# Patient Record
Sex: MALE | Race: WHITE | HISPANIC OR LATINO | Employment: OTHER | URBAN - METROPOLITAN AREA
[De-identification: names, ages, dates, MRNs, and addresses within clinical notes are randomized per-mention and may not be internally consistent; named-entity substitution may affect disease eponyms.]

---

## 2017-01-24 ENCOUNTER — OFFICE VISIT (OUTPATIENT)
Dept: MEDICAL GROUP | Facility: MEDICAL CENTER | Age: 62
End: 2017-01-24
Attending: INTERNAL MEDICINE
Payer: MEDICAID

## 2017-01-24 VITALS
RESPIRATION RATE: 22 BRPM | WEIGHT: 152 LBS | HEIGHT: 63 IN | TEMPERATURE: 101.8 F | SYSTOLIC BLOOD PRESSURE: 148 MMHG | DIASTOLIC BLOOD PRESSURE: 72 MMHG | BODY MASS INDEX: 26.93 KG/M2 | OXYGEN SATURATION: 95 % | HEART RATE: 112 BPM

## 2017-01-24 DIAGNOSIS — Q74.2 CONGENITAL FOOT ABNORMALITY: ICD-10-CM

## 2017-01-24 DIAGNOSIS — I10 ESSENTIAL HYPERTENSION: ICD-10-CM

## 2017-01-24 DIAGNOSIS — E11.9 TYPE 2 DIABETES MELLITUS WITHOUT COMPLICATION, WITHOUT LONG-TERM CURRENT USE OF INSULIN (HCC): ICD-10-CM

## 2017-01-24 DIAGNOSIS — Z12.11 SCREEN FOR COLON CANCER: ICD-10-CM

## 2017-01-24 DIAGNOSIS — E78.2 MIXED HYPERLIPIDEMIA: ICD-10-CM

## 2017-01-24 DIAGNOSIS — R19.7 ACUTE DIARRHEA: ICD-10-CM

## 2017-01-24 PROBLEM — M79.672 LEFT FOOT PAIN: Status: ACTIVE | Noted: 2017-01-24

## 2017-01-24 PROBLEM — R11.2 NON-INTRACTABLE VOMITING WITH NAUSEA: Status: ACTIVE | Noted: 2017-01-24

## 2017-01-24 PROCEDURE — 99204 OFFICE O/P NEW MOD 45 MIN: CPT | Performed by: INTERNAL MEDICINE

## 2017-01-24 PROCEDURE — 99202 OFFICE O/P NEW SF 15 MIN: CPT | Performed by: INTERNAL MEDICINE

## 2017-01-24 RX ORDER — SIMVASTATIN 20 MG
20 TABLET ORAL
Refills: 3 | COMMUNITY
Start: 2016-12-31 | End: 2018-02-15 | Stop reason: SDUPTHER

## 2017-01-24 RX ORDER — ENALAPRIL MALEATE 2.5 MG/1
TABLET ORAL
Refills: 3 | COMMUNITY
Start: 2016-12-31 | End: 2018-02-15 | Stop reason: SDUPTHER

## 2017-01-24 RX ORDER — ASPIRIN 81 MG/1
TABLET ORAL
Refills: 3 | COMMUNITY
Start: 2016-12-01 | End: 2018-02-15 | Stop reason: SDUPTHER

## 2017-01-24 RX ORDER — CIPROFLOXACIN 500 MG/1
500 TABLET, FILM COATED ORAL 2 TIMES DAILY
Qty: 10 TAB | Refills: 0 | Status: SHIPPED | OUTPATIENT
Start: 2017-01-24 | End: 2017-01-29

## 2017-01-24 RX ORDER — GLIMEPIRIDE 2 MG/1
TABLET ORAL
COMMUNITY
Start: 2017-01-22 | End: 2017-09-19

## 2017-01-24 NOTE — ASSESSMENT & PLAN NOTE
Patient reports the onset of abdominal pain, diarrhea, nausea and vomiting about 2 days ago. He has been unable to keep down any food or medications, and only small sips of water. He denies eating any contaminated foods or drinking unfiltered water. He has not traveled outside of the country recently. He has been having episodes of diarrhea at night, as well as after every time he eats. He has been taking his temperature at home, and has noticed a fever of 100.8. His temperature is 101.8 in clinic today. The stools have been mostly watery without blood or mucus. His brother had a similar illness recently.

## 2017-01-24 NOTE — MR AVS SNAPSHOT
"        Berlin Pelaez   2017 3:30 PM   Office Visit   MRN: [id number]    Department:  [facility]   Dept Phone:  [phone]    Description:  Male : 1955   Provider:  Daja Blevins M.D.           Reason for Visit     Establish Care     Emesis     Diarrhea           Allergies as of 2017     No Known Allergies      You were diagnosed with     Non-intractable vomiting with nausea, unspecified vomiting type   [2427963]       Congenital foot abnormality   [069301]       Type 2 diabetes mellitus without complication, without long-term current use of insulin (CMS-HCC)   [4441177]       Essential hypertension   [3883575]       Mixed hyperlipidemia   [272.2.ICD-9-CM]       Screen for colon cancer   [993125]         Vital Signs     Blood Pressure Pulse Temperature Respirations Height Weight    148/72 mmHg 112 38.8 °C (101.8 °F) 22 1.588 m (5' 2.52\") 68.947 kg (152 lb)    Body Mass Index Oxygen Saturation Smoking Status             27.34 kg/m2 95% Never Smoker          Basic Information     Date Of Birth Sex Race Ethnicity Preferred Language    1955 Male  or   Origin (Guinean,Malawian,Jamaican,Zimbabwean, etc) Guinean      Your appointments     2017  8:10 AM   Established Patient with Daja Blevins M.D.   [facility] ([facility])    [address] [place] [id number]   [phone]           You will be receiving a confirmation call a few days before your appointment from our automated call confirmation system.              Problem List              ICD-10-CM Priority Class Noted - Resolved    Non-intractable vomiting with nausea R11.2   2017 - Present    Congenital foot abnormality Q74.2   2017 - Present    Type 2 diabetes mellitus without complication (HCC) E11.9   2017 - Present    Essential hypertension I10   2017 - Present    Mixed hyperlipidemia E78.2   2017 - Present      Health Maintenance        Date Due " Completion Dates    A1C SCREENING 4/17/1956 ---    DIABETES MONOFILAMENT / LE EXAM 4/17/1955 ---    RETINAL SCREENING 10/17/1972 ---    IMM DTaP/Tdap/Td Vaccine (1 - Tdap) 10/17/1973 ---    IMM PNEUMOCOCCAL 19-64 (ADULT) MEDIUM RISK SERIES (1 of 1 - PPSV23) 10/17/1973 ---    COLONOSCOPY 10/17/2004 ---    IMM ZOSTER VACCINE 10/17/2014 ---    IMM INFLUENZA (1) 9/1/2016 ---    FASTING LIPID PROFILE 11/2/2016 11/2/2015, 2/4/2015    URINE ACR / MICROALBUMIN 11/2/2016 11/2/2015    SERUM CREATININE 11/2/2016 11/2/2015, 2/4/2015            Current Immunizations     No immunizations on file.      Below and/or attached are the medications your provider expects you to take. Review all of your home medications and newly ordered medications with your provider and/or pharmacist. Follow medication instructions as directed by your provider and/or pharmacist. Please keep your medication list with you and share with your provider. Update the information when medications are discontinued, doses are changed, or new medications (including over-the-counter products) are added; and carry medication information at all times in the event of emergency situations     Allergies:  No Known Allergies          Medications  Valid as of: January 24, 2017 -  4:19 PM    Generic Name Brand Name Tablet Size Instructions for use    Aspirin (Tablet Delayed Response) aspirin 81 MG TAKE 1 TABLET BY MOUTH DAILY        Ciprofloxacin HCl (Tab) CIPRO 500 MG Take 1 Tab by mouth 2 times a day for 5 days.        Enalapril Maleate (Tab) VASOTEC 2.5 MG TAKE 1 TABLET BY MOUTH DAILY FOR BLOOD PRESSURE AND KIDNEY PROTECTION        Glimepiride (Tab) AMARYL 2 MG         MetFORMIN HCl (Tab) GLUCOPHAGE 1000 MG TAKE 1 TABLET BY MOUTH 2 TIMES DAILY FOR DIABETES        Simvastatin (Tab) ZOCOR 20 MG Take 20 mg by mouth.        .                 Medicines prescribed today were sent to:     [facility] [place] - [address]    [address] [facility] [place] [address]     Phone: [phone] Fax: [phone]    Open 24 Hours?: No      Medication refill instructions:       If your prescription bottle indicates you have medication refills left, it is not necessary to call your provider’s office. Please contact your pharmacy and they will refill your medication.    If your prescription bottle indicates you do not have any refills left, you may request refills at any time through one of the following ways: The online [employer] system (except Urgent Care), by calling your provider’s office, or by asking your pharmacy to contact your provider’s office with a refill request. Medication refills are processed only during regular business hours and may not be available until the next business day. Your provider may request additional information or to have a follow-up visit with you prior to refilling your medication.   *Please Note: Medication refills are assigned a new Rx number when refilled electronically. Your pharmacy may indicate that no refills were authorized even though a new prescription for the same medication is available at the pharmacy. Please request the medicine by name with the pharmacy before contacting your provider for a refill.        Your To Do List     Future Labs/Procedures Complete By Expires    CBC WITH DIFFERENTIAL  As directed 1/25/2018    COMP METABOLIC PANEL  As directed 1/25/2018    HEMOGLOBIN A1C  As directed 1/25/2018    LIPID PROFILE  As directed 1/25/2018      Referral     A referral request has been sent to our patient care coordination department. Please allow 3-5 business days for us to process this request and contact you either by phone or mail. If you do not hear from us by the 5th business day, please call us at [phone].           [employer] Access Code: [email or web address]  Expires: 2/23/2017  4:19 PM    [employer]  A secure, online tool to manage your health information     [employer]’s [employer]® is a secure, online tool that connects you to your  personalized health information from the privacy of your home -- day or night - making it very easy for you to manage your healthcare. Once the activation process is completed, you can even access your medical information using the [employer] leni, which is available for free in the Apple Leni store or Google Play store.     [employer] provides the following levels of access (as shown below):   My Chart Features   [facility] Primary [facility] Doctor [facility]  Specialists [facility]  Urgent  Care Non-[facility]  Primary Care  Doctor   Email your healthcare team securely and privately 24/7 X X X    Manage appointments: schedule your next appointment; view details of past/upcoming appointments X      Request prescription refills. X      View recent personal medical records, including lab and immunizations X X X X   View health record, including health history, allergies, medications X X X X   Read reports about your outpatient visits, procedures, consult and ER notes X X X X   See your discharge summary, which is a recap of your hospital and/or ER visit that includes your diagnosis, lab results, and care plan. X X       How to register for [employer]:  1. Go to  [email or web address].  2. Click on the Sign Up Now box, which takes you to the New Member Sign Up page. You will need to provide the following information:  a. Enter your [employer] Access Code exactly as it appears at the top of this page. (You will not need to use this code after you’ve completed the sign-up process. If you do not sign up before the expiration date, you must request a new code.)   b. Enter your date of birth.   c. Enter your home email address.   d. Click Submit, and follow the next screen’s instructions.  3. Create a Expl[employer] ID. This will be your [employer] login ID and cannot be changed, so think of one that is secure and easy to remember.  4. Create a [employer] password. You can change your password at any time.  5. Enter your Password Reset Question and Answer. This can  be used at a later time if you forget your password.   6. Enter your e-mail address. This allows you to receive e-mail notifications when new information is available in [employer].  7. Click Sign Up. You can now view your health information.    For assistance activating your [employer] account, call [phone]

## 2017-01-25 ENCOUNTER — HOSPITAL ENCOUNTER (EMERGENCY)
Facility: MEDICAL CENTER | Age: 62
End: 2017-01-26
Attending: EMERGENCY MEDICINE
Payer: MEDICAID

## 2017-01-25 DIAGNOSIS — K52.9 ENTEROCOLITIS: ICD-10-CM

## 2017-01-25 DIAGNOSIS — R19.7 DIARRHEA, UNSPECIFIED TYPE: ICD-10-CM

## 2017-01-25 PROCEDURE — 99284 EMERGENCY DEPT VISIT MOD MDM: CPT

## 2017-01-25 ASSESSMENT — PAIN SCALES - GENERAL: PAINLEVEL_OUTOF10: 2

## 2017-01-25 NOTE — ED AVS SNAPSHOT
[employer] Access Code: [email or web address]  Expires: 2/23/2017  4:19 PM    Your email address is not on file at [facility].  Email Addresses are required for you to sign up for [employer], please contact [phone] to verify your personal information and to provide your email address prior to attempting to register for [employer].    Berlin Pelaez  [address] [place] [address]    [employer]  A secure, online tool to manage your health information     [facility]’s [employer]® is a secure, online tool that connects you to your personalized health information from the privacy of your home -- day or night - making it very easy for you to manage your healthcare. Once the activation process is completed, you can even access your medical information using the [employer] leni, which is available for free in the Apple Leni store or Google Play store.     To learn more about [employer], visit [email or web address]    There are two levels of access available (as shown below):   My Chart Features  [facility] Primary Care Doctor [facility]  Specialists [facility]  Urgent  Care Non-[facility] Primary Care Doctor   Email your healthcare team securely and privately 24/7 X X X    Manage appointments: schedule your next appointment; view details of past/upcoming appointments X      Request prescription refills. X      View recent personal medical records, including lab and immunizations X X X X   View health record, including health history, allergies, medications X X X X   Read reports about your outpatient visits, procedures, consult and ER notes X X X X   See your discharge summary, which is a recap of your hospital and/or ER visit that includes your diagnosis, lab results, and care plan X X  X     How to register for [employer]:  Once your e-mail address has been verified, follow the following steps to sign up for [employer].     1. Go to  [email or web address]  2. Click on the Sign Up Now box, which takes you to the New Member Sign Up page. You will  need to provide the following information:  a. Enter your [employer] Access Code exactly as it appears at the top of this page. (You will not need to use this code after you’ve completed the sign-up process. If you do not sign up before the expiration date, you must request a new code.)   b. Enter your date of birth.   c. Enter your home email address.   d. Click Submit, and follow the next screen’s instructions.  3. Create a [employer] ID. This will be your [employer] login ID and cannot be changed, so think of one that is secure and easy to remember.  4. Create a [employer] password. You can change your password at any time.  5. Enter your Password Reset Question and Answer. This can be used at a later time if you forget your password.   6. Enter your e-mail address. This allows you to receive e-mail notifications when new information is available in [employer].  7. Click Sign Up. You can now view your health information.    For assistance activating your [employer] account, call [phone]

## 2017-01-25 NOTE — ASSESSMENT & PLAN NOTE
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 He had an operation on his left foot during childhood when he was in [place]. He has had issues with the foot since that time. He currently walks on the ball of his foot, and has daily foot pain, as his job requires him to be standing and walking all day. He has tried Tylenol and ibuprofen for the pain which have been ineffective. He was told in the past that he would need a second surgery to correct the deformity, and that he also has a significant amount of arthritis in the foot.

## 2017-01-25 NOTE — ED AVS SNAPSHOT
After Visit Summary                                                                                                                Berlin Pelaez   MRN: [id number]    Department:  [facility], Emergency Dept   Date of Visit:  1/25/2017            [facility], Emergency Dept    [address]    [place] [address]    Phone:  [phone]      You were seen by     Andreina Guzman M.D.      Your Diagnosis Was     Diarrhea, unspecified type     R19.7       These are the medications you received during your hospitalization from 01/25/2017 2323 to 01/26/2017 0217     Date/Time Order Dose Route Action    01/26/2017 0158 iohexol (OMNIPAQUE) 350 mg/mL 100 mL Intravenous Given      Follow-up Information     1. Schedule an appointment as soon as possible for a visit with Daja Blevins M.D..    Specialty:  Internal Medicine    Why:  If symptoms worsen    Contact information    [address]  [address]  [place] [address] [phone]        Medication Information     Review all of your home medications and newly ordered medications with your primary doctor and/or pharmacist as soon as possible. Follow medication instructions as directed by your doctor and/or pharmacist.     Please keep your complete medication list with you and share with your physician. Update the information when medications are discontinued, doses are changed, or new medications (including over-the-counter products) are added; and carry medication information at all times in the event of emergency situations.               Medication List      ASK your doctor about these medications        Instructions    aspirin 81 MG EC tablet    TAKE 1 TABLET BY MOUTH DAILY       ciprofloxacin 500 MG Tabs   Commonly known as:  CIPRO    Take 1 Tab by mouth 2 times a day for 5 days.   Dose:  500 mg       enalapril 2.5 MG Tabs   Commonly known as:  VASOTEC    TAKE 1 TABLET BY MOUTH DAILY FOR BLOOD PRESSURE AND KIDNEY  PROTECTION       glimepiride 2 MG Tabs   Commonly known as:  AMARYL        metformin 1000 MG tablet   Commonly known as:  GLUCOPHAGE    TAKE 1 TABLET BY MOUTH 2 TIMES DAILY FOR DIABETES       simvastatin 20 MG Tabs   Commonly known as:  ZOCOR    Take 20 mg by mouth.   Dose:  20 mg               Procedures and tests performed during your visit     CBC WITH DIFFERENTIAL    COMP METABOLIC PANEL    CONSENT FOR CONTRAST INJECTION    CT-ABDOMEN-PELVIS WITH    ESTIMATED GFR    LIPASE    TROPONIN        Discharge Instructions       Diarrea   (Diarrhea)  La diarrea consiste en evacuaciones intestinales frecuentes, blandas o acuosas. Puede hacerlo sentir débil y deshidratado. La deshidratación puede hacer que se sienta cansado, sediento, tener la boca seca y que haya disminución de orina, que a menudo es de color amarillo oscuro. La diarrea es un signo de otro problema, generalmente iva infección que no durará mucho tiempo. En la mayoría de los casos, la diarrea dura típicamente 2 a 3 días. Sin embargo, puede durar más tiempo si se trata de un signo de algo más herlinda. Es importante tratar la diarrea luba lo indique graves médico para disminuir o prevenir futuros episodios de diarrea.   CAUSAS   Algunas causas comunes son:   · Infecciones gastrointestinales causadas por virus, bacterias o parásitos.  · Intoxicación alimentaria o alergias a los alimentos.  · Ciertos medicamentos, luba los antibióticos, quimioterapia y laxantes.  · Edulcorantes artificiales y fructosa.  · Los trastornos digestivos.  INSTRUCCIONES PARA EL CUIDADO EN EL HOGAR   · Asegure iva adecuada ingesta de líquidos (hidratación). Evite los líquidos que contengan azúcares simples o las bebidas deportivas, los jugos de frutas, los productos derivados de la leche entera y las gaseosas. Si eyad la cantidad suficiente de líquidos, la orina debe ser lucia o amarillo pálido. Iva solución de rehidratación oral se puede comprar en las farmacias, en las tiendas minoristas  y por Internet. Se puede preparar iva solución de rehidratación oral casera con los siguientes ingredientes:  ·  - cucharadita de sal.  · ¾ cucharadita de bicarbonato.  ·  de cucharadita de sal sustituta que contenga cloruro de potasio.  · 1  cucharada de azúcar.  · 1l (34 onzas) de agua.  · Ciertos alimentos y bebidas pueden aumentar la velocidad a la que el alimento se mueve a través del tracto gastrointestinal (GI). Estos alimentos y bebidas deben evitarse e incluyen:  · Bebidas alcohólicas y con cafeína.  · Alimentos ricos en fibra, luba frutas y verduras, nueces, semillas, panes y cereales integrales.  · Alimentos y bebidas endulzados con alcoholes de azúcar, tales luba xilitol, sorbitol, y manitol.  · Algunos alimentos pueden ser fidel tolerados y puede ayudar a espesar las heces, incluyendo:  · Alimentos con almidón, luba arroz, pan, pasta, cereales bajos en azúcar, lesley, sémola de maíz, michael al horno, galletas y panecillos.  · Bananas.  · Puré de manzana.  · Agregue alimentos ricos en probióticos a la dieta del alfredo para ayudar a aumentar las bacterias saludables en el tracto gastrointestinal, luba el yogur y productos lácteos fermentados.  · Lávese fidel las ayaz después de cada episodio de diarrea.  · [place] sólo medicamentos de venta li o recetados, según las indicaciones del médico.  · [place] un baño caliente para ayudar a disminuir ardor o dolor por los episodios frecuentes de diarrea.  SOLICITE ATENCIÓN MÉDICA DE INMEDIATO SI:   · No puede retener los líquidos.  · Tiene vómitos persistentes.  · Observa maura en la materia fecal, o las heces son negras y de aspecto alquitranado.  · No hay emisión de orina ines 6 a 8 horas o elimina iva pequeña cantidad de orina muy oscura.  · Tiene dolor abdominal que aumenta o se localiza.  · Está muy mareado o se desvanece.  · Sufre un dolor intenso de veronique.  · La diarrea empeora o no mejora.  · Tiene fiebre o síntomas que persisten ines más de 2 o 3  marvin.  · Tiene fiebre y los síntomas empeoran de manera súbita.  ASEGÚRESE DE QUE:   · Comprende estas instrucciones.  · Controlará graves enfermedad.  · Solicitará ayuda de inmediato si no mejora o si empeora.     Esta información no tiene luba fin reemplazar el consejo del médico. Asegúrese de hacerle al médico cualquier pregunta que tenga.     Document Released: 12/18/2006 Document Revised: 12/04/2013  [employer] Patient Education ©2016 [employer]    Colitis  La colitis es iva inflamación del colon. La colitis puede ser iva enfermedad breve o de larga duración (crónica). La enfermedad de Crohn y la colitis ulcerosa son 2 tipos de colitis crónica. Requieren tratamiento permanente.  CAUSAS  Hay numerosas causas que originan zane problema, entre las que se incluyen:  · Virus.  · Gérmenes (bacterias).  · Reacciones a medicamentos.  SÍNTOMAS  · Diarrea.  · Hemorragia intestinal.  · Dolor.  · Fiebre.  · Vómitos.  · Cansancio (fatiga).  · Pérdida de peso.  · Obstrucción intestinal.  DIAGNÓSTICO  El diagnóstico y tratamiento de la colitis se basa en el examen físico y en análisis de maura y heces. También puede ser necesario realizar radiografías y endoscopía. También podrá ser necesario suhail radiografías, tomografía computada y colonoscopía.  TRATAMIENTO  El tratamiento pueden incluir:  · Líquidos por la vena (vía intravenosa).  · Reposo del intestino (no comer ni beber nada ines cierto tiempo).  · Medicamentos para la diarrea y el dolor.  · Antibióticos.  · Medicamentos con corticoides.  · Cirugía.  INSTRUCCIONES PARA EL CUIDADO DOMICILIARIO  · Descanse lo suficiente.  · Opal gran cantidad de líquido para mantener la orina de nino gonzalez o color amarillo pálido.  · Consuma iva dieta normal y fidel balanceada.  · Comuníquese con el profesional que lo asiste para realizar un seguimiento según las indicaciones.  SOLICITE ATENCIÓN MÉDICA DE INMEDIATO SI:  · Comienza a sentir escalofríos.  · Usted tienen iva  temperatura oral de más de 38,9° C (102° F) y no puede controlarla con medicamentos.  · Siente debilidad extrema, mareos o deshidratación.  · Presenta vómitos repetidas veces.  · Dolor abdominal grave o presenta heces con maura u oscuras.  ESTÉ SEGURO QUE:   · Comprende las instrucciones para el christiano médica.  · Controlará graves enfermedad.  · Solicitará atención médica de inmediato según las indicaciones.  Document Released: 12/18/2006 Document Revised: 03/11/2013  Exit[employer]® Patient Information ©2014 [employer].            Patient Information     Patient Information    Following emergency treatment: all patient requiring follow-up care must return either to a private physician or a clinic if your condition worsens before you are able to obtain further medical attention, please return to the emergency room.     Billing Information    At [facility], we work to make the billing process streamlined for our patients.  Our Representatives are here to answer any questions you may have regarding your hospital bill.  If you have insurance coverage and have supplied your insurance information to us, we will submit a claim to your insurer on your behalf.  Should you have any questions regarding your bill, we can be reached online or by phone as follows:  Online: You are able pay your bills online or live chat with our representatives about any billing questions you may have. We are here to help Monday - Friday from 8:00am to 7:30pm and 9:00am - 12:00pm on Saturdays.  Please visit [email or web address]  for more information.   Phone:  [phone] or [phone]    Please note that your emergency physician, surgeon, pathologist, radiologist, anesthesiologist, and other specialists are not employed by [facility] and will therefore bill separately for their services.  Please contact them directly for any questions concerning their bills at the numbers below:     Emergency Physician Services:   [phone]  [facility]:  [phone]  [facility]:  [phone]  [facility]:  [phone]    1. Your final bill may vary from the amount quoted upon discharge if all procedures are not complete at that time, or if your doctor has additional procedures of which we are not aware. You will receive an additional bill if you return to the Emergency Department at [facility] for suture removal regardless of the facility of which the sutures were placed.     2. Please arrange for settlement of this account at the emergency registration.    3. All self-pay accounts are due in full at the time of treatment.  If you are unable to meet this obligation then payment is expected within 4-5 days.     4. If you have had radiology studies (CT, X-ray, Ultrasound, MRI), you have received a preliminary result during your emergency department visit. Please contact the radiology department [phone] to receive a copy of your final result. Please discuss the Final result with your primary physician or with the follow up physician provided.     Crisis Hotline:  [place] Crisis Hotline:  5-807-LDCUBBF or 1-897.460.9046  [place] Crisis Hotline:    1-863.871.4876 or 629-694-2747         ED Discharge Follow Up Questions    1. In order to provide you with very good care, we would like to follow up with a phone call in the next few days.  May we have your permission to contact you?     YES /  NO    2. What is the best phone number to call you? (       )_____-__________    3. What is the best time to call you?      Morning  /  Afternoon  /  Evening                   Patient Signature:  ____________________________________________________________    Date:  ____________________________________________________________      Your appointments     Feb 24, 2017  8:10 AM   Established Patient with Daja Blevins M.D.   [facility] ([facility])    [address] [place] [address]  [address]   [phone]           You will be receiving a confirmation call a few days before your appointment from our automated call confirmation system.

## 2017-01-25 NOTE — ED AVS SNAPSHOT
1/26/2017          Ebrlin Pelaez  [address]  [place] [address]    Dear Berlin:    Pending sale to [facility] wants to ensure your discharge home is safe and you or your loved ones have had all your questions answered regarding your care after you leave the hospital.    You may receive a telephone call within two days of your discharge.  This call is to make certain you understand your discharge instructions as well as ensure we provided you with the best care possible during your stay with us.     The call will only last approximately 3-5 minutes and will be done by a nurse.    Once again, we want to ensure your discharge home is safe and that you have a clear understanding of any next steps in your care.  If you have any questions or concerns, please do not hesitate to contact us, we are here for you.  Thank you for choosing [facility] for your healthcare needs.    Sincerely,    Jeramie Negrete    [facility]

## 2017-01-25 NOTE — PROGRESS NOTES
Berlin Pelaez is a 61 y.o. male here for diarrhea, diabetes, numerous chronic medical problems as below, establish care    HPI: Patient was previously being seen at the [facility] clinic  Acute diarrhea  Patient reports the onset of abdominal pain, diarrhea, nausea and vomiting about 2 days ago. He has been unable to keep down any food or medications, and only small sips of water. He denies eating any contaminated foods or drinking unfiltered water. He has not traveled outside of the country recently. He has been having episodes of diarrhea at night, as well as after every time he eats. He has been taking his temperature at home, and has noticed a fever of 100.8. His temperature is 101.8 in clinic today. The stools have been mostly watery without blood or mucus. His brother had a similar illness recently.     Type 2 diabetes mellitus without complication (HCC)  Patient was diagnosed with diabetes about 8 years ago. He currently takes metformin 1000 mg twice daily. He checks his blood sugars occasionally at various times throughout the day, and states that they are never higher than 160. He has never required insulin. He states that his last hemoglobin A1c showed his diabetes was well controlled, but he does not remember what the number was.    Mixed hyperlipidemia  Patient was diagnosed with hyperlipidemia about 2 years ago. He was started on simvastatin 20 mg daily.  He tolerates this medication well. His last lipid panel was about a year ago.    Essential hypertension  Patient was diagnosed with hypertension about 2 years ago. He is on enalapril 2.5 mg daily. He checks his blood pressures once in a while at home, and states they usually run in the 120s over 80s.     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 He had an operation on his left foot during childhood when he was in [place]. He has had issues with the foot since that time. He currently  "walks on the ball of his foot, and has daily foot pain, as his job requires him to be standing and walking all day. He has tried Tylenol and ibuprofen for the pain which have been ineffective. He was told in the past that he would need a second surgery to correct the deformity, and that he also has a significant amount of arthritis in the foot.      Current medicines (including changes today)  Current Outpatient Prescriptions   Medication Sig Dispense Refill   • enalapril (VASOTEC) 2.5 MG Tab TAKE 1 TABLET BY MOUTH DAILY FOR BLOOD PRESSURE AND KIDNEY PROTECTION  3   • simvastatin (ZOCOR) 20 MG Tab Take 20 mg by mouth.  3   • glimepiride (AMARYL) 2 MG Tab      • metformin (GLUCOPHAGE) 1000 MG tablet TAKE 1 TABLET BY MOUTH 2 TIMES DAILY FOR DIABETES  3   • aspirin 81 MG EC tablet TAKE 1 TABLET BY MOUTH DAILY  3   • ciprofloxacin (CIPRO) 500 MG Tab Take 1 Tab by mouth 2 times a day for 5 days. 10 Tab 0     No current facility-administered medications for this visit.     He  has a past medical history of Diabetes (CMS-[facility]); Hypertension; and Hyperlipidemia.  He  has past surgical history that includes toe arthroplasty.  Social History   Substance Use Topics   • Smoking status: Never Smoker    • Smokeless tobacco: Never Used      Comment: second hand exposure for 20 years   • Alcohol Use: 4.2 oz/week     7 Cans of beer per week     Social History     Social History Narrative    Works as a      Family History   Problem Relation Age of Onset   • Diabetes Father    • Alcohol/Drug Father    • Diabetes Sister    • No Known Problems Sister      ROS  As above in history of present illness  All other systems reviewed and are negative     Objective:     Blood pressure 148/72, pulse 112, temperature 38.8 °C (101.8 °F), resp. rate 22, height 1.588 m (5' 2.52\"), weight 68.947 kg (152 lb), SpO2 95 %. Body mass index is 27.34 kg/(m^2).  Physical Exam:    Constitutional: Alert, no distress.  Skin: Warm, dry, good turgor, no " rashes in visible areas.  Eye: Equal, round and reactive, conjunctiva clear, lids normal.  ENMT: Lips without lesions, good dentition, oropharynx clear, mucous membranes moist.  Neck: Trachea midline, no masses, no thyromegaly. No cervical or supraclavicular lymphadenopathy.  Respiratory: Unlabored respiratory effort, lungs clear to auscultation, no wheezes, no ronchi.  Cardiovascular: Tachycardic Normal S1, S2, no murmur, no edema.  Abdomen: Soft,  mild to moderate tenderness to palpation over the left lower quadrant, no rebound or guarding, bowel sounds present, no masses, no hepatosplenomegaly.  MSK: Left foot with surgical scar, patient walks on the ball of his foot at all times  Psych: Alert and oriented x3, normal affect and mood.      Assessment and Plan:   The following treatment plan was discussed    1. Congenital foot abnormality  Status post surgical correction in childhood, however now with significant arthritis and deformity requiring patient to walk on the ball of his foot at all times. He is interested in obtaining a second opinion about nonsurgical options for his foot, as he cannot afford to take 6 months off of work for an operation to correct the underlying pathology. We discussed anti-inflammatories and Tylenol, but patient is not interested in taking these as he does not find them effective.  - REFERRAL TO ORTHOPEDICS    2. Type 2 diabetes mellitus without complication, without long-term current use of insulin (CMS-[facility])  Stable on oral regimen, will continue without change. Obtain hemoglobin A1c, labs below, follow-up in one month.  - glimepiride (AMARYL) 2 MG Tab;   - metformin (GLUCOPHAGE) 1000 MG tablet; TAKE 1 TABLET BY MOUTH 2 TIMES DAILY FOR DIABETES; Refill: 3  - aspirin 81 MG EC tablet; TAKE 1 TABLET BY MOUTH DAILY; Refill: 3  - CBC WITH DIFFERENTIAL; Future  - COMP METABOLIC PANEL; Future  - HEMOGLOBIN A1C; Future  - MICROALBUMIN CREAT RATIO URINE; Future  -Monofilament exam at next  visit    3. Essential hypertension  Blood pressure today was 148/72, the patient was unable to take his enalapril this morning due to his nausea and vomiting. I suspect he is well-controlled on the small dose of medication. Will continue.  - enalapril (VASOTEC) 2.5 MG Tab; TAKE 1 TABLET BY MOUTH DAILY FOR BLOOD PRESSURE AND KIDNEY PROTECTION; Refill: 3  - COMP METABOLIC PANEL; Future    4. Mixed hyperlipidemia  Stable on simvastatin, will continue and check lipid profile.  - simvastatin (ZOCOR) 20 MG Tab; Take 20 mg by mouth.; Refill: 3  - LIPID PROFILE; Future    5. Screen for colon cancer  Has never had colon cancer screening.  - REFERRAL TO GI FOR COLONOSCOPY    6. Acute diarrhea  I suspect an infectious etiology of his diarrhea given his fever, tachycardia. He is hemodynamically stable with a normal blood pressure at this time. He has been able to keep down water as long as he takes it in small amounts. He has not taken any antibiotics recently and is at low risk for community-acquired C. difficile. I suspect he may have some diverticulitis based on the location of this pain. I will start him on Cipro 500 mg twice daily for 5 days. Instructed him to go to the emergency room if he is unable to keep down water for more than several hours. He will call the clinic if he does not improve over the next several days with antibiotic therapy.  - ciprofloxacin (CIPRO) 500 MG Tab; Take 1 Tab by mouth 2 times a day for 5 days.  Dispense: 10 Tab; Refill: 0        Followup: Return in about 4 weeks (around 2/21/2017) for labs.

## 2017-01-25 NOTE — ASSESSMENT & PLAN NOTE
Patient was diagnosed with hyperlipidemia about 2 years ago. He was started on simvastatin 20 mg daily.  He tolerates this medication well. His last lipid panel was about a year ago.

## 2017-01-25 NOTE — ASSESSMENT & PLAN NOTE
Patient was diagnosed with hypertension about 2 years ago. He is on enalapril 2.5 mg daily. He checks his blood pressures once in a while at home, and states they usually run in the 120s over 80s.

## 2017-01-26 ENCOUNTER — APPOINTMENT (OUTPATIENT)
Dept: RADIOLOGY | Facility: MEDICAL CENTER | Age: 62
End: 2017-01-26
Attending: EMERGENCY MEDICINE
Payer: MEDICAID

## 2017-01-26 VITALS
WEIGHT: 152.78 LBS | HEART RATE: 83 BPM | SYSTOLIC BLOOD PRESSURE: 117 MMHG | BODY MASS INDEX: 27.07 KG/M2 | DIASTOLIC BLOOD PRESSURE: 71 MMHG | TEMPERATURE: 98.8 F | HEIGHT: 63 IN | OXYGEN SATURATION: 95 % | RESPIRATION RATE: 16 BRPM

## 2017-01-26 LAB
ALBUMIN SERPL BCP-MCNC: 3.7 G/DL (ref 3.2–4.9)
ALBUMIN/GLOB SERPL: 1.1 G/DL
ALP SERPL-CCNC: 74 U/L (ref 30–99)
ALT SERPL-CCNC: 23 U/L (ref 2–50)
ANION GAP SERPL CALC-SCNC: 10 MMOL/L (ref 0–11.9)
AST SERPL-CCNC: 28 U/L (ref 12–45)
BASOPHILS # BLD AUTO: 1.2 % (ref 0–1.8)
BASOPHILS # BLD: 0.08 K/UL (ref 0–0.12)
BILIRUB SERPL-MCNC: 1.1 MG/DL (ref 0.1–1.5)
BUN SERPL-MCNC: 17 MG/DL (ref 8–22)
CALCIUM SERPL-MCNC: 8.8 MG/DL (ref 8.4–10.2)
CHLORIDE SERPL-SCNC: 100 MMOL/L (ref 96–112)
CO2 SERPL-SCNC: 22 MMOL/L (ref 20–33)
CREAT SERPL-MCNC: 0.99 MG/DL (ref 0.5–1.4)
EOSINOPHIL # BLD AUTO: 0.19 K/UL (ref 0–0.51)
EOSINOPHIL NFR BLD: 2.9 % (ref 0–6.9)
ERYTHROCYTE [DISTWIDTH] IN BLOOD BY AUTOMATED COUNT: 37.6 FL (ref 35.9–50)
GFR SERPL CREATININE-BSD FRML MDRD: >60 ML/MIN/1.73 M 2
GLOBULIN SER CALC-MCNC: 3.5 G/DL (ref 1.9–3.5)
GLUCOSE SERPL-MCNC: 214 MG/DL (ref 65–99)
HCT VFR BLD AUTO: 47.7 % (ref 42–52)
HGB BLD-MCNC: 16.8 G/DL (ref 14–18)
IMM GRANULOCYTES # BLD AUTO: 0.09 K/UL (ref 0–0.11)
IMM GRANULOCYTES NFR BLD AUTO: 1.4 % (ref 0–0.9)
LIPASE SERPL-CCNC: 31 U/L (ref 7–58)
LYMPHOCYTES # BLD AUTO: 0.94 K/UL (ref 1–4.8)
LYMPHOCYTES NFR BLD: 14.2 % (ref 22–41)
MCH RBC QN AUTO: 30.1 PG (ref 27–33)
MCHC RBC AUTO-ENTMCNC: 35.2 G/DL (ref 33.7–35.3)
MCV RBC AUTO: 85.3 FL (ref 81.4–97.8)
MONOCYTES # BLD AUTO: 0.8 K/UL (ref 0–0.85)
MONOCYTES NFR BLD AUTO: 12.1 % (ref 0–13.4)
NEUTROPHILS # BLD AUTO: 4.5 K/UL (ref 1.82–7.42)
NEUTROPHILS NFR BLD: 68.2 % (ref 44–72)
NRBC # BLD AUTO: 0 K/UL
NRBC BLD AUTO-RTO: 0 /100 WBC
PLATELET # BLD AUTO: 168 K/UL (ref 164–446)
PMV BLD AUTO: 9.5 FL (ref 9–12.9)
POTASSIUM SERPL-SCNC: 3.4 MMOL/L (ref 3.6–5.5)
PROT SERPL-MCNC: 7.2 G/DL (ref 6–8.2)
RBC # BLD AUTO: 5.59 M/UL (ref 4.7–6.1)
SODIUM SERPL-SCNC: 132 MMOL/L (ref 135–145)
TROPONIN I SERPL-MCNC: <0.02 NG/ML (ref 0–0.04)
WBC # BLD AUTO: 6.6 K/UL (ref 4.8–10.8)

## 2017-01-26 PROCEDURE — 85025 COMPLETE CBC W/AUTO DIFF WBC: CPT

## 2017-01-26 PROCEDURE — 84484 ASSAY OF TROPONIN QUANT: CPT

## 2017-01-26 PROCEDURE — 36415 COLL VENOUS BLD VENIPUNCTURE: CPT

## 2017-01-26 PROCEDURE — 83690 ASSAY OF LIPASE: CPT

## 2017-01-26 PROCEDURE — 80053 COMPREHEN METABOLIC PANEL: CPT

## 2017-01-26 PROCEDURE — 700105 HCHG RX REV CODE 258: Performed by: EMERGENCY MEDICINE

## 2017-01-26 PROCEDURE — 700117 HCHG RX CONTRAST REV CODE 255: Performed by: EMERGENCY MEDICINE

## 2017-01-26 PROCEDURE — 99284 EMERGENCY DEPT VISIT MOD MDM: CPT

## 2017-01-26 PROCEDURE — 74177 CT ABD & PELVIS W/CONTRAST: CPT

## 2017-01-26 RX ORDER — SODIUM CHLORIDE 9 MG/ML
1000 INJECTION, SOLUTION INTRAVENOUS ONCE
Status: COMPLETED | OUTPATIENT
Start: 2017-01-26 | End: 2017-01-26

## 2017-01-26 RX ADMIN — IOHEXOL 100 ML: 350 INJECTION, SOLUTION INTRAVENOUS at 01:58

## 2017-01-26 RX ADMIN — SODIUM CHLORIDE 1000 ML: 900 INJECTION, SOLUTION INTRAVENOUS at 00:45

## 2017-01-26 ASSESSMENT — ENCOUNTER SYMPTOMS
CONFUSION: 0
BACK PAIN: 0
NAUSEA: 1
COUGH: 0
VOMITING: 1
FEVER: 1
DIZZINESS: 0
BRUISES/BLEEDS EASILY: 0
DIARRHEA: 1

## 2017-01-26 NOTE — DISCHARGE INSTRUCTIONS
Diarrea   (Diarrhea)  La diarrea consiste en evacuaciones intestinales frecuentes, blandas o acuosas. Puede hacerlo sentir débil y deshidratado. La deshidratación puede hacer que se sienta cansado, sediento, tener la boca seca y que haya disminución de orina, que a menudo es de color amarillo oscuro. La diarrea es un signo de otro problema, generalmente iva infección que no durará mucho tiempo. En la mayoría de los casos, la diarrea dura típicamente 2 a 3 días. Sin embargo, puede durar más tiempo si se trata de un signo de algo más herlinda. Es importante tratar la diarrea luba lo indique graves médico para disminuir o prevenir futuros episodios de diarrea.   CAUSAS   Algunas causas comunes son:   · Infecciones gastrointestinales causadas por virus, bacterias o parásitos.  · Intoxicación alimentaria o alergias a los alimentos.  · Ciertos medicamentos, luba los antibióticos, quimioterapia y laxantes.  · Edulcorantes artificiales y fructosa.  · Los trastornos digestivos.  INSTRUCCIONES PARA EL CUIDADO EN EL HOGAR   · Asegure iva adecuada ingesta de líquidos (hidratación). Evite los líquidos que contengan azúcares simples o las bebidas deportivas, los jugos de frutas, los productos derivados de la leche entera y las gaseosas. Si eyad la cantidad suficiente de líquidos, la orina debe ser lucia o amarillo pálido. Iva solución de rehidratación oral se puede comprar en las farmacias, en las tiendas minoristas y por Internet. Se puede preparar iva solución de rehidratación oral casera con los siguientes ingredientes:  ·  - cucharadita de sal.  · ¾ cucharadita de bicarbonato.  ·  de cucharadita de sal sustituta que contenga cloruro de potasio.  · 1  cucharada de azúcar.  · 1l (34 onzas) de agua.  · Ciertos alimentos y bebidas pueden aumentar la velocidad a la que el alimento se mueve a través del tracto gastrointestinal (GI). Estos alimentos y bebidas deben evitarse e incluyen:  · Bebidas alcohólicas y con cafeína.  · Alimentos  ricos en fibra, luba frutas y verduras, nueces, semillas, panes y cereales integrales.  · Alimentos y bebidas endulzados con alcoholes de azúcar, tales luba xilitol, sorbitol, y manitol.  · Algunos alimentos pueden ser fidel tolerados y puede ayudar a espesar las heces, incluyendo:  · Alimentos con almidón, luba arroz, pan, pasta, cereales bajos en azúcar, lesley, sémola de maíz, michael al horno, galletas y panecillos.  · Bananas.  · Puré de manzana.  · Agregue alimentos ricos en probióticos a la dieta del alfredo para ayudar a aumentar las bacterias saludables en el tracto gastrointestinal, luba el yogur y productos lácteos fermentados.  · Lávese fidel las ayaz después de cada episodio de diarrea.  · [place] sólo medicamentos de venta li o recetados, según las indicaciones del médico.  · [place] un baño caliente para ayudar a disminuir ardor o dolor por los episodios frecuentes de diarrea.  SOLICITE ATENCIÓN MÉDICA DE INMEDIATO SI:   · No puede retener los líquidos.  · Tiene vómitos persistentes.  · Observa maura en la materia fecal, o las heces son negras y de aspecto alquitranado.  · No hay emisión de orina ines 6 a 8 horas o elimina iva pequeña cantidad de orina muy oscura.  · Tiene dolor abdominal que aumenta o se localiza.  · Está muy mareado o se desvanece.  · Sufre un dolor intenso de veronique.  · La diarrea empeora o no mejora.  · Tiene fiebre o síntomas que persisten ines más de 2 o 3 marvin.  · Tiene fiebre y los síntomas empeoran de manera súbita.  ASEGÚRESE DE QUE:   · Comprende estas instrucciones.  · Controlará graves enfermedad.  · Solicitará ayuda de inmediato si no mejora o si empeora.     Esta información no tiene luba fin reemplazar el consejo del médico. Asegúrese de hacerle al médico cualquier pregunta que tenga.     Document Released: 12/18/2006 Document Revised: 12/04/2013  Elsevier Interactive Patient Education ©2016 [employer]    Colitis  La colitis es iva inflamación del colon. La colitis puede  ser iva enfermedad breve o de larga duración (crónica). La enfermedad de Crohn y la colitis ulcerosa son 2 tipos de colitis crónica. Requieren tratamiento permanente.  CAUSAS  Hay numerosas causas que originan zane problema, entre las que se incluyen:  · Virus.  · Gérmenes (bacterias).  · Reacciones a medicamentos.  SÍNTOMAS  · Diarrea.  · Hemorragia intestinal.  · Dolor.  · Fiebre.  · Vómitos.  · Cansancio (fatiga).  · Pérdida de peso.  · Obstrucción intestinal.  DIAGNÓSTICO  El diagnóstico y tratamiento de la colitis se basa en el examen físico y en análisis de maura y heces. También puede ser necesario realizar radiografías y endoscopía. También podrá ser necesario suhail radiografías, tomografía computada y colonoscopía.  TRATAMIENTO  El tratamiento pueden incluir:  · Líquidos por la vena (vía intravenosa).  · Reposo del intestino (no comer ni beber nada ines cierto tiempo).  · Medicamentos para la diarrea y el dolor.  · Antibióticos.  · Medicamentos con corticoides.  · Cirugía.  INSTRUCCIONES PARA EL CUIDADO DOMICILIARIO  · Descanse lo suficiente.  · Opal gran cantidad de líquido para mantener la orina de nino gonzalez o color amarillo pálido.  · Consuma iva dieta normal y fidel balanceada.  · Comuníquese con el profesional que lo asiste para realizar un seguimiento según las indicaciones.  SOLICITE ATENCIÓN MÉDICA DE INMEDIATO SI:  · Comienza a sentir escalofríos.  · Usted tienen iva temperatura oral de más de 38,9° C (102° F) y no puede controlarla con medicamentos.  · Siente debilidad extrema, mareos o deshidratación.  · Presenta vómitos repetidas veces.  · Dolor abdominal grave o presenta heces con maura u oscuras.  ESTÉ SEGURO QUE:   · Comprende las instrucciones para el christiano médica.  · Controlará graves enfermedad.  · Solicitará atención médica de inmediato según las indicaciones.  Document Released: 12/18/2006 Document Revised: 03/11/2013  [employer]® Patient Information ©2014 [employer].

## 2017-01-26 NOTE — ED NOTES
".  Chief Complaint   Patient presents with   • Nausea/Vomiting/Diarrhea     since last sunday afternoon,  \"diarrhea everytime I eat something\" vomiting 2-3 times a day   • Epigastric Pain     since last sunday     .Blood pressure 120/75, pulse 81, temperature 37.1 °C (98.8 °F), resp. rate 16, height 1.6 m (5' 3\"), weight 69.3 kg (152 lb 12.5 oz), SpO2 96 %.    "

## 2017-01-26 NOTE — ED NOTES
D/c pt home, aware to take his current Rx . Pt/ family  aware of f/u instructions , aware to return for any changes or concerns. No further questions upon d/c home from ed

## 2017-01-26 NOTE — ED PROVIDER NOTES
"ED Provider Note    ED Provider Note        CHIEF COMPLAINT  Chief Complaint   Patient presents with   • Nausea/Vomiting/Diarrhea     since last sunday afternoon,  \"diarrhea everytime I eat something\" vomiting 2-3 times a day   • Epigastric Pain     since last sunday       ROSALIA Pelaez is a 61 y.o. male who presents to the Emergency Department for concern of nausea, vomiting and diarrhea. Patient has had nausea vomiting and diarrhea since Sunday. He says he's had about 5 episodes of diarrhea day which is worse with food. He denies any recent travels or antibiotic usage. He also been having some nausea vomiting isolated epigastric pain with it.The diarrhea is non-bloody. Although the diarrhea is much more than the vomiting. His brother was sick with similar symptoms last week. He did see his primary care physician yesterday and was started on ciprofloxacin for a \"infection\" and his abdomen. Upon chart review he was empirically treated for diverticulitis based on LLQ pain in clinic he was also given referral for GI for a colonoscopy. He denies any specific abdominal pain other than some diffuse abdominal cramping. Denies any fevers.    REVIEW OF SYSTEMS  Review of Systems   Constitutional: Positive for fever.        Earlier in the week    HENT: Negative for congestion.    Respiratory: Negative for cough.    Gastrointestinal: Positive for nausea, vomiting and diarrhea.   Genitourinary: Negative for difficulty urinating.   Musculoskeletal: Negative for back pain.   Skin: Negative for rash.   Neurological: Negative for dizziness.   Hematological: Does not bruise/bleed easily.   Psychiatric/Behavioral: Negative for confusion.       PAST MEDICAL HISTORY   has a past medical history of Diabetes (CMS-HCC); Hypertension; Hyperlipidemia; and Corneal transplant status.    SURGICAL HISTORY   has past surgical history that includes toe arthroplasty.    SOCIAL HISTORY  Social History   Substance Use Topics   • Smoking " "status: Never Smoker    • Smokeless tobacco: Never Used      Comment: second hand exposure for 20 years   • Alcohol Use: 4.2 oz/week     7 Cans of beer per week      History   Drug Use No       FAMILY HISTORY  Family History   Problem Relation Age of Onset   • Diabetes Father    • Alcohol/Drug Father    • Diabetes Sister    • No Known Problems Sister        CURRENT MEDICATIONS  Reviewed.  See Encounter Summary.     ALLERGIES  No Known Allergies    PHYSICAL EXAM  VITAL SIGNS: /71 mmHg  Pulse 83  Temp(Src) 37.1 °C (98.8 °F)  Resp 16  Ht 1.6 m (5' 3\")  Wt 69.3 kg (152 lb 12.5 oz)  BMI 27.07 kg/m2  SpO2 95%  Physical Exam   Constitutional: He is oriented to person, place, and time.   HENT:   Head: Normocephalic and atraumatic.   Eyes: Pupils are equal, round, and reactive to light.   Neck: Normal range of motion.   Cardiovascular: Normal rate.    Pulmonary/Chest: Effort normal.   Abdominal: Soft. There is no tenderness. There is no rebound and no guarding.   Musculoskeletal: Normal range of motion. He exhibits no edema.   Neurological: He is alert and oriented to person, place, and time.   Skin: Skin is warm and dry. He is not diaphoretic.   Psychiatric: He has a normal mood and affect. His behavior is normal.           DIAGNOSTIC STUDIES / PROCEDURES     LABS  Results for orders placed or performed during the hospital encounter of 01/25/17   CBC WITH DIFFERENTIAL   Result Value Ref Range    WBC 6.6 4.8 - 10.8 K/uL    RBC 5.59 4.70 - 6.10 M/uL    Hemoglobin 16.8 14.0 - 18.0 g/dL    Hematocrit 47.7 42.0 - 52.0 %    MCV 85.3 81.4 - 97.8 fL    MCH 30.1 27.0 - 33.0 pg    MCHC 35.2 33.7 - 35.3 g/dL    RDW 37.6 35.9 - 50.0 fL    Platelet Count 168 164 - 446 K/uL    MPV 9.5 9.0 - 12.9 fL    Neutrophils-Polys 68.20 44.00 - 72.00 %    Lymphocytes 14.20 (L) 22.00 - 41.00 %    Monocytes 12.10 0.00 - 13.40 %    Eosinophils 2.90 0.00 - 6.90 %    Basophils 1.20 0.00 - 1.80 %    Immature Granulocytes 1.40 (H) 0.00 - 0.90 " %    Nucleated RBC 0.00 /100 WBC    Neutrophils (Absolute) 4.50 1.82 - 7.42 K/uL    Lymphs (Absolute) 0.94 (L) 1.00 - 4.80 K/uL    Monos (Absolute) 0.80 0.00 - 0.85 K/uL    Eos (Absolute) 0.19 0.00 - 0.51 K/uL    Baso (Absolute) 0.08 0.00 - 0.12 K/uL    Immature Granulocytes (abs) 0.09 0.00 - 0.11 K/uL    NRBC (Absolute) 0.00 K/uL   COMP METABOLIC PANEL   Result Value Ref Range    Sodium 132 (L) 135 - 145 mmol/L    Potassium 3.4 (L) 3.6 - 5.5 mmol/L    Chloride 100 96 - 112 mmol/L    Co2 22 20 - 33 mmol/L    Anion Gap 10.0 0.0 - 11.9    Glucose 214 (H) 65 - 99 mg/dL    Bun 17 8 - 22 mg/dL    Creatinine 0.99 0.50 - 1.40 mg/dL    Calcium 8.8 8.4 - 10.2 mg/dL    AST(SGOT) 28 12 - 45 U/L    ALT(SGPT) 23 2 - 50 U/L    Alkaline Phosphatase 74 30 - 99 U/L    Total Bilirubin 1.1 0.1 - 1.5 mg/dL    Albumin 3.7 3.2 - 4.9 g/dL    Total Protein 7.2 6.0 - 8.2 g/dL    Globulin 3.5 1.9 - 3.5 g/dL    A-G Ratio 1.1 g/dL   LIPASE   Result Value Ref Range    Lipase 31 7 - 58 U/L   TROPONIN   Result Value Ref Range    Troponin I <0.02 0.00 - 0.04 ng/mL   ESTIMATED GFR   Result Value Ref Range    GFR If African American >60 >60 mL/min/1.73 m 2    GFR If Non African American >60 >60 mL/min/1.73 m 2       All labs were reviewed by me.        RADIOLOGY  CT-ABDOMEN-PELVIS WITH   Final Result         1. Long segment of wall thickening in the distal ileum and cecum in keeping with enterocolitis.      2. Air-fluid levels throughout the colon in keeping with colonic ileus and diarrheal disease.        The radiologist's interpretation of all radiological studies have been reviewed by me.    COURSE & MEDICAL DECISION MAKING  Pertinent Labs & Imaging studies reviewed. (See chart for details)      Decision Making:  This is a 61 y.o. year old male who presents with continuous diarrhea with some associated vomiting. He was previously seen by his primary care physician diagnosed with possibly articulates and started on ciprofloxacin. He still had  continued diarrhea but he has not had any more fevers.  Differential includes diverticulitis, gastritis, colitis, dehydration  He is afebrile here and not tachycardic. He has moist membranes and there is no sign of the hydration. For me on examination he had some diffuse tenderness without any rebounding or guarding. However with the persistent diarrhea and abdominal pain if other CT scan of the head and pelvis was appropriate.  CT scan showed enterocolitis  He had no leukocytosis here and was otherwise afebrile with normal electrolytes. He was given IVF 1L in the ER with no episodes of vomiting.   I reexamined the patient after CT scan his abdomen remained soft and nontender. With no fever here a ptosis and no significant risk factors unlikely C. diff. With sick contacts recently with his brother at the same things most likely just a viral gastroenteritis however his artery been started on Cipro for colitis and given the resolution as fever and no leukocytosis recommended that he continue the Cipro as previously prescribed and follow-up with his pediatrician.    FINAL IMPRESSION  1. Diarrhea, unspecified type    2. Enterocolitis

## 2017-01-26 NOTE — ED NOTES
Pt to ED c/o nausea/vomiting/diarrhea since Sun, especially after eating.  Pt was seen yesterday by PCP and given abx.

## 2017-02-27 ENCOUNTER — HOSPITAL ENCOUNTER (OUTPATIENT)
Dept: LAB | Facility: MEDICAL CENTER | Age: 62
End: 2017-02-27
Attending: INTERNAL MEDICINE
Payer: MEDICAID

## 2017-02-27 DIAGNOSIS — E78.2 MIXED HYPERLIPIDEMIA: ICD-10-CM

## 2017-02-27 DIAGNOSIS — I10 ESSENTIAL HYPERTENSION: ICD-10-CM

## 2017-02-27 DIAGNOSIS — E11.9 TYPE 2 DIABETES MELLITUS WITHOUT COMPLICATION, WITHOUT LONG-TERM CURRENT USE OF INSULIN (HCC): ICD-10-CM

## 2017-02-27 LAB
ALBUMIN SERPL BCP-MCNC: 4.4 G/DL (ref 3.2–4.9)
ALBUMIN/GLOB SERPL: 1.6 G/DL
ALP SERPL-CCNC: 66 U/L (ref 30–99)
ALT SERPL-CCNC: 25 U/L (ref 2–50)
ANION GAP SERPL CALC-SCNC: 9 MMOL/L (ref 0–11.9)
AST SERPL-CCNC: 22 U/L (ref 12–45)
BASOPHILS # BLD AUTO: 0.13 K/UL (ref 0–0.12)
BASOPHILS NFR BLD AUTO: 1.9 % (ref 0–1.8)
BILIRUB SERPL-MCNC: 0.6 MG/DL (ref 0.1–1.5)
BUN SERPL-MCNC: 17 MG/DL (ref 8–22)
CALCIUM SERPL-MCNC: 9.5 MG/DL (ref 8.5–10.5)
CHLORIDE SERPL-SCNC: 105 MMOL/L (ref 96–112)
CHOLEST SERPL-MCNC: 194 MG/DL (ref 100–199)
CO2 SERPL-SCNC: 24 MMOL/L (ref 20–33)
CREAT SERPL-MCNC: 0.72 MG/DL (ref 0.5–1.4)
EOSINOPHIL # BLD: 0.39 K/UL (ref 0–0.51)
EOSINOPHIL NFR BLD AUTO: 5.8 % (ref 0–6.9)
ERYTHROCYTE [DISTWIDTH] IN BLOOD BY AUTOMATED COUNT: 42.2 FL (ref 35.9–50)
EST. AVERAGE GLUCOSE BLD GHB EST-MCNC: 148 MG/DL
GLOBULIN SER CALC-MCNC: 2.8 G/DL (ref 1.9–3.5)
GLUCOSE SERPL-MCNC: 114 MG/DL (ref 65–99)
HBA1C MFR BLD: 6.8 % (ref 0–5.6)
HCT VFR BLD AUTO: 51.9 % (ref 42–52)
HDLC SERPL-MCNC: 62 MG/DL
HGB BLD-MCNC: 17 G/DL (ref 14–18)
IMM GRANULOCYTES # BLD AUTO: 0.12 K/UL (ref 0–0.11)
IMM GRANULOCYTES NFR BLD AUTO: 1.8 % (ref 0–0.9)
LDLC SERPL CALC-MCNC: 103 MG/DL
LYMPHOCYTES # BLD: 1.62 K/UL (ref 1–4.8)
LYMPHOCYTES NFR BLD AUTO: 24.1 % (ref 22–41)
MCH RBC QN AUTO: 29.4 PG (ref 27–33)
MCHC RBC AUTO-ENTMCNC: 32.8 G/DL (ref 33.7–35.3)
MCV RBC AUTO: 89.8 FL (ref 81.4–97.8)
MONOCYTES # BLD: 0.39 K/UL (ref 0–0.85)
MONOCYTES NFR BLD AUTO: 5.8 % (ref 0–13.4)
NEUTROPHILS # BLD: 4.06 K/UL (ref 1.82–7.42)
NEUTROPHILS NFR BLD AUTO: 60.6 % (ref 44–72)
NRBC # BLD AUTO: 0 K/UL
NRBC BLD-RTO: 0 /100 WBC
PLATELET # BLD AUTO: 218 K/UL (ref 164–446)
PMV BLD AUTO: 10.4 FL (ref 9–12.9)
POTASSIUM SERPL-SCNC: 4.2 MMOL/L (ref 3.6–5.5)
PROT SERPL-MCNC: 7.2 G/DL (ref 6–8.2)
RBC # BLD AUTO: 5.78 M/UL (ref 4.7–6.1)
SODIUM SERPL-SCNC: 138 MMOL/L (ref 135–145)
TRIGL SERPL-MCNC: 143 MG/DL (ref 0–149)
WBC # BLD AUTO: 6.7 K/UL (ref 4.8–10.8)

## 2017-02-27 PROCEDURE — 80061 LIPID PANEL: CPT

## 2017-02-27 PROCEDURE — 80053 COMPREHEN METABOLIC PANEL: CPT

## 2017-02-27 PROCEDURE — 36415 COLL VENOUS BLD VENIPUNCTURE: CPT

## 2017-02-27 PROCEDURE — 85025 COMPLETE CBC W/AUTO DIFF WBC: CPT

## 2017-02-27 PROCEDURE — 83036 HEMOGLOBIN GLYCOSYLATED A1C: CPT

## 2017-03-01 ENCOUNTER — OFFICE VISIT (OUTPATIENT)
Dept: MEDICAL GROUP | Facility: MEDICAL CENTER | Age: 62
End: 2017-03-01
Attending: INTERNAL MEDICINE
Payer: MEDICAID

## 2017-03-01 VITALS
HEIGHT: 63 IN | HEART RATE: 72 BPM | RESPIRATION RATE: 16 BRPM | TEMPERATURE: 97.3 F | BODY MASS INDEX: 26.93 KG/M2 | WEIGHT: 152 LBS | OXYGEN SATURATION: 98 % | SYSTOLIC BLOOD PRESSURE: 100 MMHG | DIASTOLIC BLOOD PRESSURE: 60 MMHG

## 2017-03-01 DIAGNOSIS — I10 ESSENTIAL HYPERTENSION: ICD-10-CM

## 2017-03-01 DIAGNOSIS — Q74.2 CONGENITAL FOOT ABNORMALITY: ICD-10-CM

## 2017-03-01 DIAGNOSIS — E78.2 MIXED HYPERLIPIDEMIA: ICD-10-CM

## 2017-03-01 DIAGNOSIS — E11.9 TYPE 2 DIABETES MELLITUS WITHOUT COMPLICATION, WITHOUT LONG-TERM CURRENT USE OF INSULIN (HCC): ICD-10-CM

## 2017-03-01 DIAGNOSIS — M54.50 CHRONIC MIDLINE LOW BACK PAIN WITHOUT SCIATICA: ICD-10-CM

## 2017-03-01 DIAGNOSIS — K62.5 RECTAL BLEEDING: ICD-10-CM

## 2017-03-01 DIAGNOSIS — G89.29 CHRONIC MIDLINE LOW BACK PAIN WITHOUT SCIATICA: ICD-10-CM

## 2017-03-01 PROBLEM — R19.7 ACUTE DIARRHEA: Status: RESOLVED | Noted: 2017-01-24 | Resolved: 2017-03-01

## 2017-03-01 PROCEDURE — 99214 OFFICE O/P EST MOD 30 MIN: CPT | Performed by: INTERNAL MEDICINE

## 2017-03-01 PROCEDURE — 99212 OFFICE O/P EST SF 10 MIN: CPT | Performed by: INTERNAL MEDICINE

## 2017-03-01 RX ORDER — PREDNISOLONE ACETATE 10 MG/ML
SUSPENSION/ DROPS OPHTHALMIC
Refills: 3 | COMMUNITY
Start: 2016-12-05 | End: 2018-05-08

## 2017-03-01 ASSESSMENT — PATIENT HEALTH QUESTIONNAIRE - PHQ9: CLINICAL INTERPRETATION OF PHQ2 SCORE: 0

## 2017-03-01 NOTE — PROGRESS NOTES
Subjective:   Berlin Pelaez is a 61 y.o. male here today for a review of labs, follow-up chronic medical problems listed below    Congenital foot abnormality  Patient has follow-up with orthopedics scheduled for March 6 to talk about options for treatment of his congenital foot abnormality.    Type 2 diabetes mellitus without complication (HCC)  Most recent A1c was 6.8. Patient continues on glimepiride 2 mg daily and metformin 1000 mg daily.    Essential hypertension  Blood pressure currently at goal, patient taking enalapril 2.5 mg daily. Tolerating medicine well    Mixed hyperlipidemia  Cholesterol is nearly at goal with LDL of 103 on current simvastatin 20 mg daily, which patient is tolerating well.    Chronic midline low back pain without sciatica  Patient reports a history of chronic low back pain. Per his previous primary care provider, he had imaging of his back done, and was referred to orthopedic spine. He says that he underwent an injection of some sort but he does not know which type, and he had relief from his pain for 8 months. This was done last year. Patient cannot remember the name of the orthopedic doctor who did the injection.  He is not currently taking medication regularly for pain.    Rectal bleeding  After his most recent acute diarrheal illness, patient reported rectal itching and some bleeding after wiping. This has subsequently resolved. He denies a history of hemorrhoids, melanotic stool, bright red blood in the stool.         Current medicines (including changes today)  Current Outpatient Prescriptions   Medication Sig Dispense Refill   • enalapril (VASOTEC) 2.5 MG Tab TAKE 1 TABLET BY MOUTH DAILY FOR BLOOD PRESSURE AND KIDNEY PROTECTION  3   • simvastatin (ZOCOR) 20 MG Tab Take 20 mg by mouth.  3   • glimepiride (AMARYL) 2 MG Tab      • metformin (GLUCOPHAGE) 1000 MG tablet TAKE 1 TABLET BY MOUTH 2 TIMES DAILY FOR DIABETES  3   • aspirin 81 MG EC tablet TAKE 1 TABLET BY MOUTH DAILY  3   •  "prednisoLONE acetate (PRED FORTE) 1 % Suspension INSTILL 1 DROP INTO LEFT EYE TWICE A DAY  3     No current facility-administered medications for this visit.     He  has a past medical history of Diabetes (CMS-[facility]); Hypertension; Hyperlipidemia; and Corneal transplant status.    ROS   As above in HPI     Objective:     Blood pressure 100/60, pulse 72, temperature 36.3 °C (97.3 °F), resp. rate 16, height 1.588 m (5' 2.52\"), weight 68.947 kg (152 lb), SpO2 98 %. Body mass index is 27.34 kg/(m^2).  Physical Exam:  Constitutional: Alert, no distress.  Skin: Warm, dry, good turgor, no rashes in visible areas.  Eye: Equal, round and reactive, conjunctiva clear, lids normal.  Rectal: Anal region externally normal with no external hemorrhoids, fissures, lesions  Psych: Alert and oriented x3, normal affect and mood.    Results and Imaging:   Lab Results   Component Value Date/Time    WBC 6.7 02/27/2017 08:33 AM    RBC 5.78 02/27/2017 08:33 AM    HEMOGLOBIN 17.0 02/27/2017 08:33 AM    HEMATOCRIT 51.9 02/27/2017 08:33 AM    MCV 89.8 02/27/2017 08:33 AM    MCH 29.4 02/27/2017 08:33 AM    MCHC 32.8* 02/27/2017 08:33 AM    MPV 10.4 02/27/2017 08:33 AM    NEUTROPHILS-POLYS 60.60 02/27/2017 08:33 AM    LYMPHOCYTES 24.10 02/27/2017 08:33 AM    MONOCYTES 5.80 02/27/2017 08:33 AM    EOSINOPHILS 5.80 02/27/2017 08:33 AM    BASOPHILS 1.90* 02/27/2017 08:33 AM      Lab Results   Component Value Date/Time    CHOLESTEROL, 02/27/2017 08:33 AM    * 02/27/2017 08:33 AM    HDL 62 02/27/2017 08:33 AM    TRIGLYCERIDES 143 02/27/2017 08:33 AM       Lab Results   Component Value Date/Time    SODIUM 138 02/27/2017 08:33 AM    POTASSIUM 4.2 02/27/2017 08:33 AM    CHLORIDE 105 02/27/2017 08:33 AM    CO2 24 02/27/2017 08:33 AM    GLUCOSE 114* 02/27/2017 08:33 AM    BUN 17 02/27/2017 08:33 AM    CREATININE 0.72 02/27/2017 08:33 AM     Lab Results   Component Value Date/Time    ALKALINE PHOSPHATASE 66 02/27/2017 08:33 AM    AST(SGOT) 22 " 02/27/2017 08:33 AM    ALT(SGPT) 25 02/27/2017 08:33 AM    TOTAL BILIRUBIN 0.6 02/27/2017 08:33 AM           Ref. Range 2/27/2017 08:33   Glycohemoglobin Latest Ref Range: 0.0-5.6 % 6.8 (H)       Assessment and Plan:   The following treatment plan was discussed    1. Chronic midline low back pain without sciatica  Patient had some sort of injection procedure in the past which he cannot remember. He is already established with an orthopedic spine provider at [facility]. When he goes to [facility] for his foot, he plans to inquire on which doctor he had previously seen, and see if he can schedule a follow-up appointment as his referral has been within the past year.  -follow up with [facility]     2. Congenital foot abnormality  Patient has appointment scheduled for March 6 at [facility] for further evaluation, discussion of possible surgical intervention.    3. Type 2 diabetes mellitus without complication, without long-term current use of insulin (CMS-[facility])  Well-controlled, last A1c was 6.8. We will continue on current oral therapy. Repeat A1c in 6 months. He is currently on an aspirin, statin, and ACE inhibitor.  -Monofilament testing at next visit  -Repeat A1c in 6 months, urine microalbumin at next visit  -Continue metformin 1000 mg daily, glyburide 2 mg daily    4. Essential hypertension  Well-controlled with enalapril 2.5 mg daily, will continue.    5. Mixed hyperlipidemia  Well-controlled with simvastatin 20 mg daily, will continue.    6. Rectal bleeding  Resolved, no abnormalities on external exam today. Suspect was secondary to skin irritation after acute diarrheal illness. No further workup required.    Followup: Return in about 4 months (around 7/1/2017) for diabetes.

## 2017-03-01 NOTE — MR AVS SNAPSHOT
"        Berlin Saavedral   3/1/2017 9:10 AM   Office Visit   MRN: [id number]    [facility]   Dept Phone:  [phone]    Description:  Male : 1955   Provider:  Daja Blevins M.D.           Reason for Visit     Results     Rectal Problems bleeding and itching     Low Back Pain           Allergies as of 3/1/2017     No Known Allergies      You were diagnosed with     Chronic midline low back pain without sciatica   [3834815]       Congenital foot abnormality   [859729]       Type 2 diabetes mellitus without complication, without long-term current use of insulin (CMS-HCC)   [7520524]       Essential hypertension   [4823718]       Mixed hyperlipidemia   [272.2.ICD-9-CM]       Rectal bleeding   [468658]         Vital Signs     Blood Pressure Pulse Temperature Respirations Height Weight    100/60 mmHg 72 36.3 °C (97.3 °F) 16 1.588 m (5' 2.52\") 68.947 kg (152 lb)    Body Mass Index Oxygen Saturation Smoking Status             27.34 kg/m2 98% Never Smoker          Basic Information     Date Of Birth Sex Race Ethnicity Preferred Language Language for Written Material    1955 Male  or   Origin (Malawian,Luxembourger,Moldovan,Zambian, etc) English Malawian      Problem List              ICD-10-CM Priority Class Noted - Resolved    Congenital foot abnormality Q74.2   2017 - Present    Type 2 diabetes mellitus without complication (HCC) E11.9   2017 - Present    Essential hypertension I10   2017 - Present    Mixed hyperlipidemia E78.2   2017 - Present    Chronic midline low back pain without sciatica M54.5, G89.29   3/1/2017 - Present    Rectal bleeding K62.5   3/1/2017 - Present      Health Maintenance        Date Due Completion Dates    DIABETES MONOFILAMENT / LE EXAM 1956 ---    RETINAL SCREENING 10/17/1972 ---    IMM DTaP/Tdap/Td Vaccine (1 - Tdap) 10/17/1973 ---    IMM PNEUMOCOCCAL 19-64 (ADULT) MEDIUM RISK SERIES (1 of 1 - PPSV23) 10/17/1973 ---  "    COLONOSCOPY 10/17/2004 ---    IMM ZOSTER VACCINE 10/17/2014 ---    IMM INFLUENZA (1) 9/1/2016 ---    URINE ACR / MICROALBUMIN 11/2/2016 11/2/2015    A1C SCREENING 8/27/2017 2/27/2017    FASTING LIPID PROFILE 2/27/2018 2/27/2017, 11/2/2015, 2/4/2015    SERUM CREATININE 2/27/2018 2/27/2017, 1/26/2017, 11/2/2015, 2/4/2015            Current Immunizations     No immunizations on file.      Below and/or attached are the medications your provider expects you to take. Review all of your home medications and newly ordered medications with your provider and/or pharmacist. Follow medication instructions as directed by your provider and/or pharmacist. Please keep your medication list with you and share with your provider. Update the information when medications are discontinued, doses are changed, or new medications (including over-the-counter products) are added; and carry medication information at all times in the event of emergency situations     Allergies:  No Known Allergies          Medications  Valid as of: March 01, 2017 -  9:33 AM    Generic Name Brand Name Tablet Size Instructions for use    Aspirin (Tablet Delayed Response) aspirin 81 MG TAKE 1 TABLET BY MOUTH DAILY        Enalapril Maleate (Tab) VASOTEC 2.5 MG TAKE 1 TABLET BY MOUTH DAILY FOR BLOOD PRESSURE AND KIDNEY PROTECTION        Glimepiride (Tab) AMARYL 2 MG         MetFORMIN HCl (Tab) GLUCOPHAGE 1000 MG TAKE 1 TABLET BY MOUTH 2 TIMES DAILY FOR DIABETES        PrednisoLONE Acetate (Suspension) PRED FORTE 1 % INSTILL 1 DROP INTO LEFT EYE TWICE A DAY        Simvastatin (Tab) ZOCOR 20 MG Take 20 mg by mouth.        .                 Medicines prescribed today were sent to:     [facility] [place], [place] - [address] [address]    Phone: [phone] Fax: [phone]    Open 24 Hours?: No      Medication refill instructions:       If your prescription bottle indicates you have medication refills left, it is not necessary to  call your provider’s office. Please contact your pharmacy and they will refill your medication.    If your prescription bottle indicates you do not have any refills left, you may request refills at any time through one of the following ways: The online Kiwigrid system (except Urgent Care), by calling your provider’s office, or by asking your pharmacy to contact your provider’s office with a refill request. Medication refills are processed only during regular business hours and may not be available until the next business day. Your provider may request additional information or to have a follow-up visit with you prior to refilling your medication.   *Please Note: Medication refills are assigned a new Rx number when refilled electronically. Your pharmacy may indicate that no refills were authorized even though a new prescription for the same medication is available at the pharmacy. Please request the medicine by name with the pharmacy before contacting your provider for a refill.           Kiwigrid Access Code: [email or web address]  Expires: 3/29/2017  8:25 AM    Kiwigrid  A secure, online tool to manage your health information     [employer]’s Kiwigrid® is a secure, online tool that connects you to your personalized health information from the privacy of your home -- day or night - making it very easy for you to manage your healthcare. Once the activation process is completed, you can even access your medical information using the Kiwigrid leni, which is available for free in the Apple Leni store or Google Play store.     Kiwigrid provides the following levels of access (as shown below):   My Chart Features   [facility]  Specialists [facility]  Urgent  Care [facility]  Primary Care  Doctor   Email your healthcare team securely and privately 24/7 X X X    Manage appointments: schedule your next appointment; view details of past/upcoming appointments X      Request prescription refills. X      View recent  personal medical records, including lab and immunizations X X X X   View health record, including health history, allergies, medications X X X X   Read reports about your outpatient visits, procedures, consult and ER notes X X X X   See your discharge summary, which is a recap of your hospital and/or ER visit that includes your diagnosis, lab results, and care plan. X X       How to register for [employer]:  1. Go to  [email or web address].  2. Click on the Sign Up Now box, which takes you to the New Member Sign Up page. You will need to provide the following information:  a. Enter your P[employer] Green Access Code exactly as it appears at the top of this page. (You will not need to use this code after you’ve completed the sign-up process. If you do not sign up before the expiration date, you must request a new code.)   b. Enter your date of birth.   c. Enter your home email address.   d. Click Submit, and follow the next screen’s instructions.  3. Create a Pushing Green ID. This will be your P[employer] Green login ID and cannot be changed, so think of one that is secure and easy to remember.  4. Create a P[employer] Green password. You can change your password at any time.  5. Enter your Password Reset Question and Answer. This can be used at a later time if you forget your password.   6. Enter your e-mail address. This allows you to receive e-mail notifications when new information is available in Pushing Green.  7. Click Sign Up. You can now view your health information.    For assistance activating your [employer] account, call [phone]

## 2017-03-01 NOTE — ASSESSMENT & PLAN NOTE
Cholesterol is nearly at goal with LDL of 103 on current simvastatin 20 mg daily, which patient is tolerating well.

## 2017-03-01 NOTE — ASSESSMENT & PLAN NOTE
Patient reports a history of chronic low back pain. Per his previous primary care provider, he had imaging of his back done, and was referred to orthopedic spine. He says that he underwent an injection of some sort but he does not know which type, and he had relief from his pain for 8 months. This was done last year. Patient cannot remember the name of the orthopedic doctor who did the injection.  He is not currently taking medication regularly for pain.

## 2017-03-01 NOTE — ASSESSMENT & PLAN NOTE
Patient has follow-up with orthopedics scheduled for March 6 to talk about options for treatment of his congenital foot abnormality.

## 2017-03-01 NOTE — ASSESSMENT & PLAN NOTE
After his most recent acute diarrheal illness, patient reported rectal itching and some bleeding after wiping. This has subsequently resolved. He denies a history of hemorrhoids, melanotic stool, bright red blood in the stool.

## 2017-09-19 ENCOUNTER — OFFICE VISIT (OUTPATIENT)
Dept: MEDICAL GROUP | Facility: MEDICAL CENTER | Age: 62
End: 2017-09-19
Attending: INTERNAL MEDICINE
Payer: MEDICAID

## 2017-09-19 VITALS
OXYGEN SATURATION: 94 % | WEIGHT: 145 LBS | HEART RATE: 82 BPM | BODY MASS INDEX: 25.69 KG/M2 | SYSTOLIC BLOOD PRESSURE: 110 MMHG | TEMPERATURE: 97.9 F | DIASTOLIC BLOOD PRESSURE: 70 MMHG | RESPIRATION RATE: 16 BRPM | HEIGHT: 63 IN

## 2017-09-19 DIAGNOSIS — G89.29 CHRONIC MIDLINE LOW BACK PAIN WITHOUT SCIATICA: ICD-10-CM

## 2017-09-19 DIAGNOSIS — Q74.2 CONGENITAL FOOT ABNORMALITY: ICD-10-CM

## 2017-09-19 DIAGNOSIS — Z12.11 SCREEN FOR COLON CANCER: ICD-10-CM

## 2017-09-19 DIAGNOSIS — E11.9 TYPE 2 DIABETES MELLITUS WITHOUT COMPLICATION, WITHOUT LONG-TERM CURRENT USE OF INSULIN (HCC): ICD-10-CM

## 2017-09-19 DIAGNOSIS — Z23 NEED FOR VACCINATION: ICD-10-CM

## 2017-09-19 DIAGNOSIS — M54.50 CHRONIC MIDLINE LOW BACK PAIN WITHOUT SCIATICA: ICD-10-CM

## 2017-09-19 DIAGNOSIS — I10 ESSENTIAL HYPERTENSION: ICD-10-CM

## 2017-09-19 PROBLEM — Z94.7 HISTORY OF CORNEAL TRANSPLANT: Status: ACTIVE | Noted: 2017-09-19

## 2017-09-19 PROBLEM — K62.5 RECTAL BLEEDING: Status: RESOLVED | Noted: 2017-03-01 | Resolved: 2017-09-19

## 2017-09-19 LAB
HBA1C MFR BLD: 10.6 % (ref ?–5.8)
INT CON NEG: NEGATIVE
INT CON POS: POSITIVE

## 2017-09-19 PROCEDURE — 90732 PPSV23 VACC 2 YRS+ SUBQ/IM: CPT | Performed by: INTERNAL MEDICINE

## 2017-09-19 PROCEDURE — 83036 HEMOGLOBIN GLYCOSYLATED A1C: CPT | Performed by: INTERNAL MEDICINE

## 2017-09-19 PROCEDURE — 90715 TDAP VACCINE 7 YRS/> IM: CPT | Performed by: INTERNAL MEDICINE

## 2017-09-19 PROCEDURE — 99213 OFFICE O/P EST LOW 20 MIN: CPT | Performed by: INTERNAL MEDICINE

## 2017-09-19 PROCEDURE — 99214 OFFICE O/P EST MOD 30 MIN: CPT | Mod: 25 | Performed by: INTERNAL MEDICINE

## 2017-09-19 PROCEDURE — 90471 IMMUNIZATION ADMIN: CPT | Performed by: INTERNAL MEDICINE

## 2017-09-19 RX ORDER — GLIMEPIRIDE 4 MG/1
4 TABLET ORAL EVERY MORNING
Qty: 30 TAB | Refills: 3 | Status: SHIPPED | OUTPATIENT
Start: 2017-09-19 | End: 2018-02-15

## 2017-09-19 ASSESSMENT — PAIN SCALES - GENERAL: PAINLEVEL: 3=SLIGHT PAIN

## 2017-09-19 NOTE — ASSESSMENT & PLAN NOTE
Rarely checks blood pressure at home but reports that when he does it is well controlled. Today in clinic he is 110/70. He continues on the enalapril 2.5 mg daily.

## 2017-09-19 NOTE — ASSESSMENT & PLAN NOTE
Patient reports no changes in his diet at home. He does eat quite a bit of carbohydrates and has 1-2 beers per night. The globe and A1c at last visit was 6.8. He is currently at 10.1 today. He continues on metformin 1000 mg twice daily and Amaryl 2 mg daily.

## 2017-09-19 NOTE — ASSESSMENT & PLAN NOTE
Patient reports following up with orthopedics. States that he has received 2 injections in his back. This had been helpful for him in the past but has not been helpful recently. He has follow-up scheduled with them next month to readdress possible treatment options.

## 2017-09-19 NOTE — ASSESSMENT & PLAN NOTE
Patient has been seen by [facility]. He states he has received some injections which have been helpful although he still has continued pain.

## 2017-09-19 NOTE — PROGRESS NOTES
Subjective:   Berlin Pelaez is a 61 y.o. male here today for Follow-up diabetes, blood pressure, foot and back pain    Type 2 diabetes mellitus without complication (HCC)  Patient reports no changes in his diet at home. He does eat quite a bit of carbohydrates and has 1-2 beers per night. The globe and A1c at last visit was 6.8. He is currently at 10.1 today. He continues on metformin 1000 mg twice daily and Amaryl 2 mg daily.    Essential hypertension  Rarely checks blood pressure at home but reports that when he does it is well controlled. Today in clinic he is 110/70. He continues on the enalapril 2.5 mg daily.    Congenital foot abnormality  Patient has been seen by [facility]. He states he has received some injections which have been helpful although he still has continued pain.    Chronic midline low back pain without sciatica  Patient reports following up with orthopedics. States that he has received 2 injections in his back. This had been helpful for him in the past but has not been helpful recently. He has follow-up scheduled with them next month to readdress possible treatment options.       Current medicines (including changes today)  Current Outpatient Prescriptions   Medication Sig Dispense Refill   • glimepiride (AMARYL) 4 MG Tab Take 1 Tab by mouth every morning. 30 Tab 3   • prednisoLONE acetate (PRED FORTE) 1 % Suspension INSTILL 1 DROP INTO LEFT EYE TWICE A DAY  3   • enalapril (VASOTEC) 2.5 MG Tab TAKE 1 TABLET BY MOUTH DAILY FOR BLOOD PRESSURE AND KIDNEY PROTECTION  3   • simvastatin (ZOCOR) 20 MG Tab Take 20 mg by mouth.  3   • metformin (GLUCOPHAGE) 1000 MG tablet TAKE 1 TABLET BY MOUTH 2 TIMES DAILY FOR DIABETES  3   • aspirin 81 MG EC tablet TAKE 1 TABLET BY MOUTH DAILY  3     No current facility-administered medications for this visit.      He  has a past medical history of Corneal transplant status; Diabetes (CMS-[facility]); Hyperlipidemia; and Hypertension.    ROS   As above in  "HPI     Objective:     Blood pressure 110/70, pulse 82, temperature 36.6 °C (97.9 °F), resp. rate 16, height 1.588 m (5' 2.52\"), weight 65.8 kg (145 lb), SpO2 94 %. Body mass index is 26.08 kg/m².   Physical Exam:  Constitutional: Alert, no distress.  Skin: Warm, dry, good turgor, no rashes in visible areas.  Eye: Equal, round and reactive, conjunctiva clear, lids normal.  Psych: Alert and oriented x3, normal affect and mood.    Results and Imaging:   POC A1c in clinic today was 10.1    Assessment and Plan:   The following treatment plan was discussed    1. Type 2 diabetes mellitus without complication, without long-term current use of insulin (CMS-[facility])  Uncontrolled. Patient's last A1c was 6.8 6 months ago. States he has not changed his diet at all. I have increased his dose of glimepiride 4 mg daily. We will have him follow up in 3 months for recheck. We discussed diabetic diet, reducing carbohydrate and sugar intake.  -Continue metformin 1000 mg twice a day  -Increase glimepiride to 4 mg daily  -Follow up in 3 months for repeat A1c  - POCT  A1C    2. Need for vaccination  - PNEUMOCOCCAL POLYSACCHARIDE VACCINE 23-VALENT =>3YO SQ/IM  - Tdap =>6yo IM    3. Screen for colon cancer  - OCCULT BLOOD FECES IMMUNOASSAY; Future    4. Essential hypertension  Stable, well-controlled. Continue current medications.  -Enalapril 2.5 mg daily    5. Congenital foot abnormality  Improved after injections. Patient will continue to follow up with orthopedics.  -cont specialty care    6. Chronic midline low back pain without sciatica  Not improved after injections. Patient has follow-up with orthopedics next month to discuss further treatment options.  -cont specialty care      Followup: Return in about 3 months (around 12/19/2017) for diabetes.         "

## 2017-09-28 ENCOUNTER — NON-PROVIDER VISIT (OUTPATIENT)
Dept: MEDICAL GROUP | Facility: MEDICAL CENTER | Age: 62
End: 2017-09-28
Attending: INTERNAL MEDICINE
Payer: MEDICAID

## 2017-09-28 DIAGNOSIS — H61.21 IMPACTED CERUMEN OF RIGHT EAR: ICD-10-CM

## 2017-09-28 PROCEDURE — 69209 REMOVE IMPACTED EAR WAX UNI: CPT | Performed by: INTERNAL MEDICINE

## 2017-09-28 NOTE — PROGRESS NOTES
Berlin Pelaez is a 61 y.o. male here for a non-provider visit for Ear lavage unilateral rt, pt ear was irrigated with warm water mixed with peroxide. Pt ear was checked by Nuzhat Garces and it was clear of wax.      If abnormal was an in office provider notified today (if so, indicate provider)? Yes  Routed to PCP? Yes

## 2018-02-15 ENCOUNTER — OFFICE VISIT (OUTPATIENT)
Dept: MEDICAL GROUP | Facility: MEDICAL CENTER | Age: 63
End: 2018-02-15
Attending: INTERNAL MEDICINE
Payer: MEDICAID

## 2018-02-15 VITALS
OXYGEN SATURATION: 97 % | HEIGHT: 63 IN | TEMPERATURE: 97.9 F | RESPIRATION RATE: 16 BRPM | DIASTOLIC BLOOD PRESSURE: 70 MMHG | WEIGHT: 154 LBS | HEART RATE: 74 BPM | SYSTOLIC BLOOD PRESSURE: 110 MMHG | BODY MASS INDEX: 27.29 KG/M2

## 2018-02-15 DIAGNOSIS — E11.9 TYPE 2 DIABETES MELLITUS WITHOUT COMPLICATION, WITHOUT LONG-TERM CURRENT USE OF INSULIN (HCC): ICD-10-CM

## 2018-02-15 DIAGNOSIS — M54.50 CHRONIC MIDLINE LOW BACK PAIN WITHOUT SCIATICA: ICD-10-CM

## 2018-02-15 DIAGNOSIS — E78.2 MIXED HYPERLIPIDEMIA: ICD-10-CM

## 2018-02-15 DIAGNOSIS — I10 ESSENTIAL HYPERTENSION: ICD-10-CM

## 2018-02-15 DIAGNOSIS — G89.29 CHRONIC MIDLINE LOW BACK PAIN WITHOUT SCIATICA: ICD-10-CM

## 2018-02-15 PROCEDURE — 99214 OFFICE O/P EST MOD 30 MIN: CPT | Performed by: INTERNAL MEDICINE

## 2018-02-15 PROCEDURE — 99213 OFFICE O/P EST LOW 20 MIN: CPT | Performed by: INTERNAL MEDICINE

## 2018-02-15 PROCEDURE — 83036 HEMOGLOBIN GLYCOSYLATED A1C: CPT | Performed by: INTERNAL MEDICINE

## 2018-02-15 RX ORDER — GLIMEPIRIDE 2 MG/1
2 TABLET ORAL EVERY MORNING
Qty: 30 TAB | Refills: 6 | Status: SHIPPED | OUTPATIENT
Start: 2018-02-15 | End: 2018-05-08

## 2018-02-15 RX ORDER — ENALAPRIL MALEATE 2.5 MG/1
2.5 TABLET ORAL DAILY
Qty: 30 TAB | Refills: 6 | Status: SHIPPED | OUTPATIENT
Start: 2018-02-15 | End: 2018-11-24 | Stop reason: SDUPTHER

## 2018-02-15 RX ORDER — ASPIRIN 81 MG/1
81 TABLET ORAL DAILY
Qty: 90 TAB | Refills: 3 | Status: SHIPPED | OUTPATIENT
Start: 2018-02-15 | End: 2019-01-02 | Stop reason: SDUPTHER

## 2018-02-15 RX ORDER — SIMVASTATIN 20 MG
20 TABLET ORAL EVERY EVENING
Qty: 30 TAB | Refills: 6 | Status: SHIPPED | OUTPATIENT
Start: 2018-02-15 | End: 2018-05-30 | Stop reason: SDUPTHER

## 2018-02-15 RX ORDER — MELOXICAM 15 MG/1
TABLET ORAL
COMMUNITY
Start: 2018-01-15 | End: 2018-05-08

## 2018-02-15 ASSESSMENT — PAIN SCALES - GENERAL: PAINLEVEL: NO PAIN

## 2018-02-15 NOTE — ASSESSMENT & PLAN NOTE
Patient is currently following up with ortho spine. He states that every 6 weeks he get some shots in his back which really help with the pain. He was also given a prescription for meloxicam which he has been taking daily. We discussed that he can start taking this one as needed instead of daily. Overall, he feels his symptoms are well controlled.

## 2018-02-15 NOTE — ASSESSMENT & PLAN NOTE
Hemoglobin A1c checked in clinic today is 7.7. Patient's previous A1c was in 10. This was about 6 months ago. At that point in time, I recommended he increase his glimepiride dose to 4 mg daily. However it does not seem like the prescription was never filled for this dose and he had continued on the 2 mg daily dose which is the bottle he brings in today.

## 2018-02-15 NOTE — ASSESSMENT & PLAN NOTE
Current treatment: simvastatin 20 mg daily, tolerates well  Last lipid panel:   Lab Results   Component Value Date/Time    CHOLSTRLTOT 194 02/27/2017 08:33 AM     (H) 02/27/2017 08:33 AM    HDL 62 02/27/2017 08:33 AM    TRIGLYCERIDE 143 02/27/2017 08:33 AM

## 2018-02-15 NOTE — PROGRESS NOTES
Subjective:   Berlin Pelaez is a 62 y.o. male here today for follow-up diabetes, hyperlipidemia, hypertension, back pain.    Chronic midline low back pain without sciatica  Patient is currently following up with ortho spine. He states that every 6 weeks he get some shots in his back which really help with the pain. He was also given a prescription for meloxicam which he has been taking daily. We discussed that he can start taking this one as needed instead of daily. Overall, he feels his symptoms are well controlled.    Mixed hyperlipidemia  Current treatment: simvastatin 20 mg daily, tolerates well  Last lipid panel:   Lab Results   Component Value Date/Time    CHOLSTRLTOT 194 02/27/2017 08:33 AM     (H) 02/27/2017 08:33 AM    HDL 62 02/27/2017 08:33 AM    TRIGLYCERIDE 143 02/27/2017 08:33 AM         Essential hypertension  Currently takes enalapril 2.5 mg daily with good blood pressure control. His blood pressure in clinic today is 110/70. He does not monitor it at home.    Type 2 diabetes mellitus without complication (HCC)  Hemoglobin A1c checked in clinic today is 7.7. Patient's previous A1c was in 10. This was about 6 months ago. At that point in time, I recommended he increase his glimepiride dose to 4 mg daily. However it does not seem like the prescription was never filled for this dose and he had continued on the 2 mg daily dose which is the bottle he brings in today.       Current medicines (including changes today)  Current Outpatient Prescriptions   Medication Sig Dispense Refill   • enalapril (VASOTEC) 2.5 MG Tab Take 1 Tab by mouth every day. 30 Tab 6   • metformin (GLUCOPHAGE) 1000 MG tablet TAKE 1 TABLET BY MOUTH 2 TIMES DAILY FOR DIABETES 60 Tab 6   • simvastatin (ZOCOR) 20 MG Tab Take 1 Tab by mouth every evening. 30 Tab 6   • aspirin 81 MG EC tablet Take 1 Tab by mouth every day. TAKE 1 TABLET BY MOUTH DAILY 90 Tab 3   • glimepiride (AMARYL) 2 MG Tab Take 1 Tab by mouth every morning. 30  "Tab 6   • meloxicam (MOBIC) 15 MG tablet      • prednisoLONE acetate (PRED FORTE) 1 % Suspension INSTILL 1 DROP INTO LEFT EYE TWICE A DAY  3     No current facility-administered medications for this visit.      He  has a past medical history of Corneal transplant status; Diabetes (CMS-HCC); Hyperlipidemia; and Hypertension.    ROS   As above in HPI     Objective:     Blood pressure 110/70, pulse 74, temperature 36.6 °C (97.9 °F), resp. rate 16, height 1.588 m (5' 2.52\"), weight 69.9 kg (154 lb), SpO2 97 %. Body mass index is 27.7 kg/m².   Physical Exam:  Constitutional: Alert, no distress.  Skin: Warm, dry, good turgor, no rashes in visible areas.  Eye: Equal, round and reactive, conjunctiva clear, lids normal.  Respiratory: Unlabored respiratory effort, lungs clear to auscultation, no wheezes, no ronchi.  Cardiovascular: Regular rate and rhythm, no murmurs appreciated, no lower extremity edema  Psych: Alert and oriented x3, normal affect and mood.      Results and Imaging:   POC A1c in clinic today was 7.7    Assessment and Plan:   The following treatment plan was discussed    1. Essential hypertension  Stable, well controlled with current medications. Refill today.  - enalapril (VASOTEC) 2.5 MG Tab; Take 1 Tab by mouth every day.  Dispense: 30 Tab; Refill: 6  - COMP METABOLIC PANEL; Future    2. Type 2 diabetes mellitus without complication, without long-term current use of insulin (CMS-HCC)  Stable, fairly well controlled despite no increase in dose of Amaryl. Patient will continue on the 2 mg daily of the Amaryl as well as metformin. We will obtain updated diabetic labs.  - metformin (GLUCOPHAGE) 1000 MG tablet; TAKE 1 TABLET BY MOUTH 2 TIMES DAILY FOR DIABETES  Dispense: 60 Tab; Refill: 6  - aspirin 81 MG EC tablet; Take 1 Tab by mouth every day. TAKE 1 TABLET BY MOUTH DAILY  Dispense: 90 Tab; Refill: 3  - MICROALBUMIN CREAT RATIO URINE; Future  - glimepiride (AMARYL) 2 MG Tab; Take 1 Tab by mouth every " morning.  Dispense: 30 Tab; Refill: 6  - POCT  A1C    3. Mixed hyperlipidemia  Stable, well-controlled on current medications. We will obtain yearly labs and continue medication which was refilled today.  - simvastatin (ZOCOR) 20 MG Tab; Take 1 Tab by mouth every evening.  Dispense: 30 Tab; Refill: 6  - LIPID PROFILE; Future    4. Chronic midline low back pain without sciatica  Stable, controlled with injections which he receives through orthopedics. Patient will continue care through them. We discussed that his meloxicam can be taken as needed and does not need to be daily. Patient expressed understanding.  -Continue follow-up with orthopedics  -Meloxicam 15 mg daily as needed        Followup: Return in about 6 months (around 8/15/2018) for diabetes, hypertension, hyperlipidemia.

## 2018-02-15 NOTE — ASSESSMENT & PLAN NOTE
Currently takes enalapril 2.5 mg daily with good blood pressure control. His blood pressure in clinic today is 110/70. He does not monitor it at home.

## 2018-03-05 ENCOUNTER — HOSPITAL ENCOUNTER (OUTPATIENT)
Dept: LAB | Facility: MEDICAL CENTER | Age: 63
End: 2018-03-05
Attending: INTERNAL MEDICINE
Payer: MEDICAID

## 2018-03-05 DIAGNOSIS — E11.9 TYPE 2 DIABETES MELLITUS WITHOUT COMPLICATION, WITHOUT LONG-TERM CURRENT USE OF INSULIN (HCC): ICD-10-CM

## 2018-03-05 DIAGNOSIS — I10 ESSENTIAL HYPERTENSION: ICD-10-CM

## 2018-03-05 DIAGNOSIS — E78.2 MIXED HYPERLIPIDEMIA: ICD-10-CM

## 2018-03-05 LAB
ALBUMIN SERPL BCP-MCNC: 4.3 G/DL (ref 3.2–4.9)
ALBUMIN/GLOB SERPL: 1.6 G/DL
ALP SERPL-CCNC: 60 U/L (ref 30–99)
ALT SERPL-CCNC: 24 U/L (ref 2–50)
ANION GAP SERPL CALC-SCNC: 8 MMOL/L (ref 0–11.9)
AST SERPL-CCNC: 20 U/L (ref 12–45)
BILIRUB SERPL-MCNC: 0.7 MG/DL (ref 0.1–1.5)
BUN SERPL-MCNC: 15 MG/DL (ref 8–22)
CALCIUM SERPL-MCNC: 9.8 MG/DL (ref 8.5–10.5)
CHLORIDE SERPL-SCNC: 103 MMOL/L (ref 96–112)
CHOLEST SERPL-MCNC: 171 MG/DL (ref 100–199)
CO2 SERPL-SCNC: 26 MMOL/L (ref 20–33)
CREAT SERPL-MCNC: 0.67 MG/DL (ref 0.5–1.4)
CREAT UR-MCNC: 39.3 MG/DL
GLOBULIN SER CALC-MCNC: 2.7 G/DL (ref 1.9–3.5)
GLUCOSE SERPL-MCNC: 178 MG/DL (ref 65–99)
HDLC SERPL-MCNC: 62 MG/DL
LDLC SERPL CALC-MCNC: 80 MG/DL
MICROALBUMIN UR-MCNC: <0.7 MG/DL
MICROALBUMIN/CREAT UR: NORMAL MG/G (ref 0–30)
POTASSIUM SERPL-SCNC: 4.2 MMOL/L (ref 3.6–5.5)
PROT SERPL-MCNC: 7 G/DL (ref 6–8.2)
SODIUM SERPL-SCNC: 137 MMOL/L (ref 135–145)
TRIGL SERPL-MCNC: 146 MG/DL (ref 0–149)

## 2018-03-05 PROCEDURE — 80061 LIPID PANEL: CPT

## 2018-03-05 PROCEDURE — 82570 ASSAY OF URINE CREATININE: CPT

## 2018-03-05 PROCEDURE — 82043 UR ALBUMIN QUANTITATIVE: CPT

## 2018-03-05 PROCEDURE — 36415 COLL VENOUS BLD VENIPUNCTURE: CPT

## 2018-03-05 PROCEDURE — 80053 COMPREHEN METABOLIC PANEL: CPT

## 2018-05-08 ENCOUNTER — OFFICE VISIT (OUTPATIENT)
Dept: MEDICAL GROUP | Facility: MEDICAL CENTER | Age: 63
End: 2018-05-08
Attending: INTERNAL MEDICINE
Payer: MEDICAID

## 2018-05-08 VITALS
BODY MASS INDEX: 26.58 KG/M2 | SYSTOLIC BLOOD PRESSURE: 112 MMHG | WEIGHT: 150 LBS | HEIGHT: 63 IN | HEART RATE: 60 BPM | OXYGEN SATURATION: 98 % | DIASTOLIC BLOOD PRESSURE: 70 MMHG | RESPIRATION RATE: 16 BRPM | TEMPERATURE: 98 F

## 2018-05-08 DIAGNOSIS — R35.0 URINARY FREQUENCY: ICD-10-CM

## 2018-05-08 DIAGNOSIS — T14.8XXA TRAUMATIC HEMATOMA: ICD-10-CM

## 2018-05-08 DIAGNOSIS — E11.9 TYPE 2 DIABETES MELLITUS WITHOUT COMPLICATION, WITHOUT LONG-TERM CURRENT USE OF INSULIN (HCC): ICD-10-CM

## 2018-05-08 LAB
APPEARANCE UR: CLEAR
BILIRUB UR STRIP-MCNC: NEGATIVE MG/DL
COLOR UR AUTO: YELLOW
GLUCOSE UR STRIP.AUTO-MCNC: 2000 MG/DL
HBA1C MFR BLD: 9.3 % (ref ?–5.8)
INT CON NEG: NEGATIVE
INT CON POS: POSITIVE
KETONES UR STRIP.AUTO-MCNC: NORMAL MG/DL
LEUKOCYTE ESTERASE UR QL STRIP.AUTO: NEGATIVE
NITRITE UR QL STRIP.AUTO: NEGATIVE
PH UR STRIP.AUTO: 6 [PH] (ref 5–8)
PROT UR QL STRIP: NEGATIVE MG/DL
RBC UR QL AUTO: NEGATIVE
SP GR UR STRIP.AUTO: 1.01
UROBILINOGEN UR STRIP-MCNC: NEGATIVE MG/DL

## 2018-05-08 PROCEDURE — 81002 URINALYSIS NONAUTO W/O SCOPE: CPT | Performed by: INTERNAL MEDICINE

## 2018-05-08 PROCEDURE — 99213 OFFICE O/P EST LOW 20 MIN: CPT | Performed by: INTERNAL MEDICINE

## 2018-05-08 PROCEDURE — 83036 HEMOGLOBIN GLYCOSYLATED A1C: CPT | Performed by: INTERNAL MEDICINE

## 2018-05-08 PROCEDURE — 99214 OFFICE O/P EST MOD 30 MIN: CPT | Performed by: INTERNAL MEDICINE

## 2018-05-08 RX ORDER — TAMSULOSIN HYDROCHLORIDE 0.4 MG/1
0.4 CAPSULE ORAL DAILY
Qty: 30 CAP | Refills: 1 | Status: SHIPPED | OUTPATIENT
Start: 2018-05-08 | End: 2018-05-08

## 2018-05-08 RX ORDER — GLIMEPIRIDE 4 MG/1
4 TABLET ORAL EVERY MORNING
Qty: 30 TAB | Refills: 3 | Status: SHIPPED | OUTPATIENT
Start: 2018-05-08 | End: 2018-05-30 | Stop reason: SDUPTHER

## 2018-05-08 ASSESSMENT — PAIN SCALES - GENERAL: PAINLEVEL: NO PAIN
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
## 2018-05-08 NOTE — ASSESSMENT & PLAN NOTE
When seen in clinic today is elevated at 9.3. Patient reports good compliance with metformin twice daily and glimepiride 2 mg daily.  His diet is relatively high in carbohydrates. He works as a  and has sufficient physical exercise daily.

## 2018-05-08 NOTE — PROGRESS NOTES
Subjective:   Berlin Clifford is a 62 y.o. male here today for lump on left leg, increased urinary frequency, follow-up diabetes    Urinary frequency  The patient feels like he has to urinate about 20 times during the day. He states that he drinks about 2 L of water a day. He avoids caffeine. He denies nocturia, usually 1-2 episodes if at all. Denies dysuria, hematuria. He feels like his stream is a little bit weaker but denies sensation of incomplete bladder emptying.    Type 2 diabetes mellitus without complication (HCC)  When seen in clinic today is elevated at 9.3. Patient reports good compliance with metformin twice daily and glimepiride 2 mg daily.  His diet is relatively high in carbohydrates. He works as a  and has sufficient physical exercise daily.    Traumatic hematoma  Reports that about a month ago, he was hit in the leg while at work very hard with a stick. He states that the area was very bruised and swollen. Reports that the swelling has subsided significantly but he still has a lump about the size of a grape on the inner aspect of his left thigh. Reports that it is nontender. Denies any problems with moving the leg.       Current medicines (including changes today)  Current Outpatient Prescriptions   Medication Sig Dispense Refill   • glimepiride (AMARYL) 4 MG Tab Take 1 Tab by mouth every morning. 30 Tab 3   • enalapril (VASOTEC) 2.5 MG Tab Take 1 Tab by mouth every day. 30 Tab 6   • metformin (GLUCOPHAGE) 1000 MG tablet TAKE 1 TABLET BY MOUTH 2 TIMES DAILY FOR DIABETES 60 Tab 6   • simvastatin (ZOCOR) 20 MG Tab Take 1 Tab by mouth every evening. 30 Tab 6   • aspirin 81 MG EC tablet Take 1 Tab by mouth every day. TAKE 1 TABLET BY MOUTH DAILY 90 Tab 3     No current facility-administered medications for this visit.      He  has a past medical history of Corneal transplant status; Diabetes (CMS-HCC) (HCC); Hyperlipidemia; and Hypertension.    ROS   As above in HPI     Objective:  "    Blood pressure 112/70, pulse 60, temperature 36.7 °C (98 °F), resp. rate 16, height 1.588 m (5' 2.52\"), weight 68 kg (150 lb), SpO2 98 %. Body mass index is 26.98 kg/m².   Physical Exam:  Constitutional: Alert, no distress.  Skin: Warm, dry, good turgor, no rashes in visible areas.  Eye: Equal, round and reactive, conjunctiva clear, lids normal.  Psych: Alert and oriented x3, normal affect and mood.  MSK: approx 1.5 cm raised firm nodule over medial left thigh about shelter between knee and groin, non-tender, no surrounding ecchymoses, no fluctuance    Results and Imaging:   POC A1c in clinic today was 9.3  POC UA in clinic today was normal except for >2000 glucose    Assessment and Plan:   The following treatment plan was discussed    1. Type 2 diabetes mellitus without complication, without long-term current use of insulin (HCC)  Uncontrolled. We talked in depth about dietary modifications. We will increase his glimepiride to 4 mg daily. He should continue metformin and return to clinic in 3 months for repeat A1c.  -Dietary modifications  -Continue metformin 1000 twice a day  - POCT  A1C  - glimepiride (AMARYL) 4 MG Tab; Take 1 Tab by mouth every morning.  Dispense: 30 Tab; Refill: 3  -Repeat A1c at follow up in 3 months    2. Urinary frequency  Is likely secondary to glucosuria. We discussed improving control of blood sugars. If he still having symptoms at that point, I would consider starting him on Flomax.  -Diabetic control as above  - POCT Urinalysis    3. Traumatic hematoma  Based on history, suspect that this still represents a resolving traumatic hematoma. It is also very superficial and nontender. We discussed that if it's still present and has not decreased in size after one more month, he should let us know so we can image it with ultrasound.  -Continue to monitor, if still present in one month we will image with ultrasound        Followup: Return in about 3 months (around 8/8/2018), or if symptoms " worsen or fail to improve, for diabetes.

## 2018-05-08 NOTE — ASSESSMENT & PLAN NOTE
The patient feels like he has to urinate about 20 times during the day. He states that he drinks about 2 L of water a day. He avoids caffeine. He denies nocturia, usually 1-2 episodes if at all. Denies dysuria, hematuria. He feels like his stream is a little bit weaker but denies sensation of incomplete bladder emptying.

## 2018-05-30 ENCOUNTER — TELEPHONE (OUTPATIENT)
Dept: MEDICAL GROUP | Facility: MEDICAL CENTER | Age: 63
End: 2018-05-30

## 2018-05-30 DIAGNOSIS — E11.9 TYPE 2 DIABETES MELLITUS WITHOUT COMPLICATION, WITHOUT LONG-TERM CURRENT USE OF INSULIN (HCC): ICD-10-CM

## 2018-05-30 DIAGNOSIS — E78.2 MIXED HYPERLIPIDEMIA: ICD-10-CM

## 2018-05-30 RX ORDER — SIMVASTATIN 20 MG
20 TABLET ORAL EVERY EVENING
Qty: 30 TAB | Refills: 6 | Status: SHIPPED | OUTPATIENT
Start: 2018-05-30 | End: 2019-01-02 | Stop reason: SDUPTHER

## 2018-05-30 RX ORDER — GLIMEPIRIDE 4 MG/1
4 TABLET ORAL EVERY MORNING
Qty: 30 TAB | Refills: 3 | Status: SHIPPED | OUTPATIENT
Start: 2018-05-30 | End: 2019-01-02 | Stop reason: SDUPTHER

## 2018-05-30 NOTE — TELEPHONE ENCOUNTER
1. Caller Name: Berlin                                        Call Back Number: [phone]      Patient approves a detailed voicemail message: yes    Patient stopped by the office requesting refill on Simvastatin 20mg. Last script his pharmacy has is one ordered by old pcp. Please send new script to [facility] on [address]  Also, patient stated that at his last appointment, he was told Glimepiride was going to be increased to 4mg but his pharmacy didn't get new script with new dosage. Please also send to [facility] on [address]

## 2018-11-15 DIAGNOSIS — E11.9 TYPE 2 DIABETES MELLITUS WITHOUT COMPLICATION, WITHOUT LONG-TERM CURRENT USE OF INSULIN (HCC): ICD-10-CM

## 2018-12-19 DIAGNOSIS — E11.9 TYPE 2 DIABETES MELLITUS WITHOUT COMPLICATION, WITHOUT LONG-TERM CURRENT USE OF INSULIN (HCC): ICD-10-CM

## 2019-01-02 ENCOUNTER — OFFICE VISIT (OUTPATIENT)
Dept: MEDICAL GROUP | Facility: MEDICAL CENTER | Age: 64
End: 2019-01-02
Attending: INTERNAL MEDICINE
Payer: MEDICAID

## 2019-01-02 VITALS
DIASTOLIC BLOOD PRESSURE: 78 MMHG | SYSTOLIC BLOOD PRESSURE: 118 MMHG | HEIGHT: 63 IN | OXYGEN SATURATION: 94 % | TEMPERATURE: 97.9 F | HEART RATE: 68 BPM | WEIGHT: 156.5 LBS | RESPIRATION RATE: 16 BRPM | BODY MASS INDEX: 27.73 KG/M2

## 2019-01-02 DIAGNOSIS — Z12.11 SCREEN FOR COLON CANCER: ICD-10-CM

## 2019-01-02 DIAGNOSIS — M54.50 CHRONIC MIDLINE LOW BACK PAIN WITHOUT SCIATICA: ICD-10-CM

## 2019-01-02 DIAGNOSIS — I10 ESSENTIAL HYPERTENSION: ICD-10-CM

## 2019-01-02 DIAGNOSIS — E11.9 TYPE 2 DIABETES MELLITUS WITHOUT COMPLICATION, WITHOUT LONG-TERM CURRENT USE OF INSULIN (HCC): ICD-10-CM

## 2019-01-02 DIAGNOSIS — G89.29 CHRONIC MIDLINE LOW BACK PAIN WITHOUT SCIATICA: ICD-10-CM

## 2019-01-02 DIAGNOSIS — E78.2 MIXED HYPERLIPIDEMIA: ICD-10-CM

## 2019-01-02 PROBLEM — R35.0 URINARY FREQUENCY: Status: RESOLVED | Noted: 2018-05-08 | Resolved: 2019-01-02

## 2019-01-02 PROBLEM — T14.8XXA TRAUMATIC HEMATOMA: Status: RESOLVED | Noted: 2018-05-08 | Resolved: 2019-01-02

## 2019-01-02 LAB
HBA1C MFR BLD: 7.4 % (ref ?–5.8)
INT CON NEG: NEGATIVE
INT CON POS: POSITIVE

## 2019-01-02 PROCEDURE — 83036 HEMOGLOBIN GLYCOSYLATED A1C: CPT | Performed by: INTERNAL MEDICINE

## 2019-01-02 PROCEDURE — 99214 OFFICE O/P EST MOD 30 MIN: CPT | Performed by: INTERNAL MEDICINE

## 2019-01-02 PROCEDURE — 99212 OFFICE O/P EST SF 10 MIN: CPT | Performed by: INTERNAL MEDICINE

## 2019-01-02 RX ORDER — ENALAPRIL MALEATE 2.5 MG/1
2.5 TABLET ORAL DAILY
Qty: 30 TAB | Refills: 6 | Status: SHIPPED | OUTPATIENT
Start: 2019-01-02 | End: 2019-06-19 | Stop reason: SDUPTHER

## 2019-01-02 RX ORDER — GLIMEPIRIDE 4 MG/1
4 TABLET ORAL EVERY MORNING
Qty: 30 TAB | Refills: 6 | Status: SHIPPED | OUTPATIENT
Start: 2019-01-02 | End: 2019-06-19 | Stop reason: SDUPTHER

## 2019-01-02 RX ORDER — ASPIRIN 81 MG/1
81 TABLET ORAL DAILY
Qty: 90 TAB | Refills: 3 | Status: SHIPPED | OUTPATIENT
Start: 2019-01-02 | End: 2020-01-07

## 2019-01-02 RX ORDER — SIMVASTATIN 20 MG
20 TABLET ORAL EVERY EVENING
Qty: 30 TAB | Refills: 6 | Status: SHIPPED | OUTPATIENT
Start: 2019-01-02 | End: 2019-06-19 | Stop reason: SDUPTHER

## 2019-01-02 ASSESSMENT — PAIN SCALES - GENERAL: PAINLEVEL: NO PAIN

## 2019-01-03 NOTE — ASSESSMENT & PLAN NOTE
He was previously following with [facility] and was receiving epidural injections as well as trigger point injections for his low back pain.  He reports that the initial epidural injection worked very well but subsequent procedures did not help him much.  He is interested in doing some physical therapy and is requesting a referral today.  He denies numbness, tingling, or weakness in both of his legs.  His degree of pain correlates directly with how much heavy lifting, bending, and walking he is doing at work.  He is currently working as a .

## 2019-01-03 NOTE — ASSESSMENT & PLAN NOTE
He continues on metformin 1000 mg twice daily and glimepiride 4 mg daily.  He reports good compliance with the medications.  He does not his blood sugars regularly.  He denies any recent changes in diet.  His last A1c approximately 7 months ago was 9.3.  His last retinopathy screening was about a year ago.

## 2019-01-03 NOTE — ASSESSMENT & PLAN NOTE
He reports blood pressures have been well controlled at home.  He continues on enalapril 2.5 mg daily and is requesting a refill today.  In clinic, blood pressure is well controlled at 118/78

## 2019-01-03 NOTE — PROGRESS NOTES
Subjective:   Berlin Clifford is a 63 y.o. male here today for follow-up diabetes, hypertension, hyperlipidemia, back pain    Chronic midline low back pain without sciatica  He was previously following with [facility] and was receiving epidural injections as well as trigger point injections for his low back pain.  He reports that the initial epidural injection worked very well but subsequent procedures did not help him much.  He is interested in doing some physical therapy and is requesting a referral today.  He denies numbness, tingling, or weakness in both of his legs.  His degree of pain correlates directly with how much heavy lifting, bending, and walking he is doing at work.  He is currently working as a .    Essential hypertension  He reports blood pressures have been well controlled at home.  He continues on enalapril 2.5 mg daily and is requesting a refill today.  In clinic, blood pressure is well controlled at 118/78    Mixed hyperlipidemia  He continues on the simvastatin 20 mg daily which is tolerating well.  He is due for repeat labs in March.    Type 2 diabetes mellitus without complication (HCC)  He continues on metformin 1000 mg twice daily and glimepiride 4 mg daily.  He reports good compliance with the medications.  He does not his blood sugars regularly.  He denies any recent changes in diet.  His last A1c approximately 7 months ago was 9.3.  His last retinopathy screening was about a year ago.       Current medicines (including changes today)  Current Outpatient Prescriptions   Medication Sig Dispense Refill   • enalapril (VASOTEC) 2.5 MG Tab Take 1 Tab by mouth every day. 30 Tab 6   • metformin (GLUCOPHAGE) 1000 MG tablet TAKE 1 TABLET BY MOUTH 2 TIMES DAILY FOR DIABETES 60 Tab 6   • simvastatin (ZOCOR) 20 MG Tab Take 1 Tab by mouth every evening. 30 Tab 6   • aspirin 81 MG EC tablet Take 1 Tab by mouth every day. TAKE 1 TABLET BY MOUTH DAILY 90 Tab 3   • glimepiride  "(AMARYL) 4 MG Tab Take 1 Tab by mouth every morning. 30 Tab 6     No current facility-administered medications for this visit.      He  has a past medical history of Corneal transplant status; Diabetes (HCC); Hyperlipidemia; and Hypertension.    ROS   As above in HPI     Objective:     Blood pressure 118/78, pulse 68, temperature 36.6 °C (97.9 °F), temperature source Temporal, resp. rate 16, height 1.588 m (5' 2.52\"), weight 71 kg (156 lb 8 oz), SpO2 94 %. Body mass index is 28.15 kg/m².   Physical Exam:  Constitutional: Alert, no distress.  Skin: Warm, dry, good turgor, no rashes in visible areas.  Eye: Equal, round and reactive, conjunctiva clear, lids normal.  Respiratory: Unlabored respiratory effort  MSK: Mild tenderness to palpation over the bilateral lumbar paraspinal muscles, altered gait secondary to his congenital foot abnormality    Results and Imaging:   POC A1c in clinic today was 7.4    Assessment and Plan:   The following treatment plan was discussed    1. Type 2 diabetes mellitus without complication, without long-term current use of insulin ([facility])  His A1c has improved since last checked.  We discussed a diabetic diet.  We will continue his current diabetic therapy.  Referral placed for retinopathy exam, and he will obtain updated labs in March.  - POCT  A1C  - metformin (GLUCOPHAGE) 1000 MG tablet; TAKE 1 TABLET BY MOUTH 2 TIMES DAILY FOR DIABETES  Dispense: 60 Tab; Refill: 6  - glimepiride (AMARYL) 4 MG Tab; Take 1 Tab by mouth every morning.  Dispense: 30 Tab; Refill: 6  - REFERRAL TO OPHTHALMOLOGY  - Lipid Profile; Future  - MICROALBUMIN CREAT RATIO URINE; Future  - HEMOGLOBIN A1C; Future  - COMP METABOLIC PANEL; Future    2. Essential hypertension  Stable, well-controlled with current medications which were refilled today  - enalapril (VASOTEC) 2.5 MG Tab; Take 1 Tab by mouth every day.  Dispense: 30 Tab; Refill: 6  - COMP METABOLIC PANEL; Future    3. Chronic midline low back pain without " sciatica  He desires to do physical therapy for his back pain and I have placed a referral today.  - REFERRAL TO PHYSICAL THERAPY Reason for Therapy: Eval/Treat/Report    4. Mixed hyperlipidemia  Stable, well-controlled with current medications which were refilled today.  We will update labs.  - simvastatin (ZOCOR) 20 MG Tab; Take 1 Tab by mouth every evening.  Dispense: 30 Tab; Refill: 6  - Lipid Profile; Future    5. Screen for colon cancer  - OCCULT BLOOD FECES IMMUNOASSAY (FIT); Future        Followup: Return in about 6 months (around 7/2/2019) for diabetes, hypertension, hyperlipidemia.

## 2019-01-03 NOTE — ASSESSMENT & PLAN NOTE
He continues on the simvastatin 20 mg daily which is tolerating well.  He is due for repeat labs in March.

## 2019-01-09 ENCOUNTER — PHYSICAL THERAPY (OUTPATIENT)
Dept: PHYSICAL THERAPY | Facility: REHABILITATION | Age: 64
End: 2019-01-09
Attending: INTERNAL MEDICINE
Payer: MEDICAID

## 2019-01-09 DIAGNOSIS — G89.29 CHRONIC MIDLINE LOW BACK PAIN WITHOUT SCIATICA: ICD-10-CM

## 2019-01-09 DIAGNOSIS — M54.50 CHRONIC MIDLINE LOW BACK PAIN WITHOUT SCIATICA: ICD-10-CM

## 2019-01-09 PROCEDURE — 97161 PT EVAL LOW COMPLEX 20 MIN: CPT

## 2019-01-09 ASSESSMENT — ENCOUNTER SYMPTOMS
PAIN SCALE AT LOWEST: 1
EXACERBATED BY: PROLONGED SITTING
PAIN SCALE: 3
QUALITY: DEEP
QUALITY: SHARP
EXACERBATED BY: TWISTING
EXACERBATED BY: BENDING
ALLEVIATING FACTORS: REST
QUALITY: ACHING
PAIN SCALE AT HIGHEST: 9
EXACERBATED BY: PROLONGED STANDING
QUALITY: TIGHTNESS
PAIN TIMING: ALL DAY
QUALITY: STABBING

## 2019-01-09 ASSESSMENT — ACTIVITIES OF DAILY LIVING (ADL): POOR_BALANCE: 1

## 2019-01-09 NOTE — OP THERAPY EVALUATION
"  Outpatient Physical Therapy  INITIAL EVALUATION    [facility] Physical Therapy [facility]  [address]  [address]  [place] [address]  Phone:  [phone]  Fax:  [phone]    Date of Evaluation: 2019    Patient: Berlin Clifford  YOB: 1955  MRN: [id number]     Referring Provider: Daja Blevins M.D.  [address]  [address]  [place] [address]   Referring Diagnosis Chronic midline low back pain without sciatica [M54.5, G89.29]     Time Calculation  Start time: 924  Stop time: 957 Time Calculation (min): 33 minutes     Physical Therapy Occurrence Codes    Date of onset of impairment:  17   Date physical therapy care plan established or reviewed:  19   Date physical therapy treatment started:  19          Chief Complaint: Back Problem    Visit Diagnoses     ICD-10-CM   1. Chronic midline low back pain without sciatica M54.5    G89.29         Subjective:   History of Present Illness:     Mechanism of injury:  Pt presents to PT with complaint of chronic LBP.  States this started \"years ago.\" He has been given an epidural, which gave relief for about 4 months.  Underwent 2 more injections, but no relief from either.  Notices weakness in the R hip flexion. He is unsure how long this has been a problem.  No N/T in the LEs. Pain is mainly central, but can radiate into B glutes.  The problem is limiting his work. He is in a slow period right now, so back is not as painful as it would be when jobs increase.     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    Treatments:     None    Patient Goals:     Patient goals for therapy:  Decreased pain, increased motion, increased strength, return to sport/leisure activities, independence with ADLs/IADLs and return to work      Past Medical History:   Diagnosis Date   • Corneal transplant status    • Diabetes (HCC)    • Hyperlipidemia    • Hypertension      Past Surgical History:   Procedure Laterality Date   • TOE ARTHROPLASTY       Social History   Substance Use Topics   • Smoking status: Never Smoker   • Smokeless tobacco: Never Used      Comment: second hand exposure for 20 years   • Alcohol use 4.2 oz/week     7 Cans of beer per week     Family and Occupational History     Social History   • Marital status:      Spouse name: N/A   • Number of children: N/A   • Years of education: N/A       Objective     Postural Observations  Seated posture: fair  Standing posture: poor        Hip Screen   Hip range of motion within functional limits with the following exceptions: Passive hip ER is painful in lumbar, but equal to other side.  ER= 40, IR=20 deg. SLR R= 60, L= 40 deg    Neurological Testing     Reflexes   Left   Patellar (L4): normal (2+)    Right   Patellar (L4): normal (2+)    Myotome testing   Lumbar (left)   All left lumbar myotomes within normal limits    Lumbar (right)   All right lumbar myotomes within normal limits    Dermatome testing   Lumbar (left)   All left lumbar dermatomes intact    Lumbar (right)   All right lumbar dermatomes intact    Palpation   Left   Tenderness of the lumbar paraspinals.     Right   Tenderness of the lumbar paraspinals, piriformis and quadratus lumborum.     Tenderness     Left Hip   Tenderness in the iliac crest and iliolumbar ligament.  No tenderness in the sacroiliac joint.     Right Hip   Tenderness in the iliac crest, iliolumbar  ligament and sacroiliac joint.     Active Range of Motion     Lumbar   Flexion: decreased  Extension: decreased  Left lateral flexion: within functional limits  Right lateral flexion: decreased  Left rotation: within functional limits  Right rotation: decreased    Additional Active Range of Motion Details  Poor L hip shift    Joint Play   Spine     Central PA [place]        L3: painful and hypomobile with grade 2       L4: painful and hypomobile with grade 2       L5: painful and hypomobile with grade 2       S1: painful with grade 2    Unilateral PA Glide (right)        L4: painful       L5: painful       S1: painful        Strength:      Abdominals   Lower abdominals: Able to initiate but not maintain neutral    Additional Strength Details  LE strength is WFL, but painful R hip flexion in lumbar spine.  L LE is limited in mobility by CP type presentation (PF contracture, knee flexion contracture) but strong within available ROM.     Tests     Left Hip   SLR: Negative.     Right Hip   SLR: Negative.     General Comments     Spine Comments   ROM improves and pain reduces with L hip shifts and time in JARED.  Manual txn not tested.       Exercises/Treatment  Time-based treatments/modalities:          Assessment, Response and Plan:   Impairments: abnormal gait, abnormal muscle tone, abnormal or restricted ROM, activity intolerance, difficulty performing job, impaired functional mobility, impaired balance, lacks appropriate home exercise program, limited ADL's and pain with function    Assessment details:  Mr. Clifford is a 63 y.o male who presents to PT with complaint of chronic LBP that is worsened by work activity (prolonged standing, walking, lifting). PT evaluation reveals symptoms most consistent with lumbar dysfunction.  He responds favorably to extension bias, which may indicate and discogenic source.  His neural exam is WNL.  Congenital gait abnormality may also be playing a roll.  Skilled PT services are  indicated to address the mentioned functional limitations and enhance QOL.     Barriers to therapy:  None  Prognosis: good    Goals:   Short Term Goals:   - Improve lateral shift R and L to equal and painless  - Improve baseline pain level to intermittent  - Able to TA brace in all functional positions indep  Short term goal time span:  1-2 weeks      Long Term Goals:    - Improve RMQ at least 20%  - Able to lift 25# box x 10 reps without pain  - Indep with HEP  Long term goal time span:  6-8 weeks    Plan:   Therapy options:  Physical therapy treatment to continue  Planned therapy interventions:  Neuromuscular Re-education (CPT 11337), Therapeutic Exercise (CPT 61030) and Mechanical Traction (CPT 20229)  Frequency:  1x week  Duration in weeks:  8  Discussed with:  Patient      Functional Limitations and Severity Modifiers  Luke Gilson Low Back Pain and Disability Score: 70.83     Referring provider co-signature:  I have reviewed this plan of care and my co-signature certifies the need for services.  Certification Dates:   From 1/9/19     To 3/6/19    Physician Signature: ________________________________ Date: ______________

## 2019-01-16 ENCOUNTER — APPOINTMENT (OUTPATIENT)
Dept: PHYSICAL THERAPY | Facility: REHABILITATION | Age: 64
End: 2019-01-16
Attending: INTERNAL MEDICINE
Payer: MEDICAID

## 2019-01-31 ENCOUNTER — PHYSICAL THERAPY (OUTPATIENT)
Dept: PHYSICAL THERAPY | Facility: REHABILITATION | Age: 64
End: 2019-01-31
Attending: INTERNAL MEDICINE
Payer: MEDICAID

## 2019-01-31 DIAGNOSIS — M54.50 CHRONIC MIDLINE LOW BACK PAIN WITHOUT SCIATICA: ICD-10-CM

## 2019-01-31 DIAGNOSIS — G89.29 CHRONIC MIDLINE LOW BACK PAIN WITHOUT SCIATICA: ICD-10-CM

## 2019-01-31 PROCEDURE — 97110 THERAPEUTIC EXERCISES: CPT

## 2019-01-31 PROCEDURE — 97012 MECHANICAL TRACTION THERAPY: CPT

## 2019-01-31 NOTE — OP THERAPY DAILY TREATMENT
"  Outpatient Physical Therapy  DAILY TREATMENT     [facility] Physical Therapy [place] [address]  [address]  [place] [address]  Phone:  [phone]  Fax:  [phone]    Date: 01/31/2019    Patient: Berlin Clifford  YOB: 1955  MRN: [id number]     Time Calculation  Start time: 0930  Stop time: 1014 Time Calculation (min): 44 minutes     Chief Complaint: Back Problem    Visit #: 2    SUBJECTIVE:  About the same as eval.  \"I am good as long as I don't work too much.\"     OBJECTIVE:      Therapeutic Exercises (CPT 38895):     1. NuStep, L2 x 5 min    2. 3D pelvic tilt, x 2 min ea direction    3. LTR, x 20 ea, painful on the R with knees to L    4. KTOS, x 1 min ea: manually    5. Bridges, x 20    6. STS, focus on hip hinging, equal work R to L. x 15 reps, first couple reps were painful, but improved as he went.     Therapeutic Treatments and Modalities:     1. Mechanical Traction (CPT 82968), 70/40#, 60/20\" with MH x 15 min    Time-based treatments/modalities:  Therapeutic exercise minutes (CPT 62035): 30 minutes       ASSESSMENT:   Response to treatment: The contractures through the L LE greatly impact lumbar loading.  Therex was well tolerated overall.  Initiated txn and will monitor response.     PLAN/RECOMMENDATIONS:   Plan for treatment: therapy treatment to continue next visit.  Planned interventions for next visit: continue with current treatment.      "

## 2019-02-08 ENCOUNTER — PHYSICAL THERAPY (OUTPATIENT)
Dept: PHYSICAL THERAPY | Facility: REHABILITATION | Age: 64
End: 2019-02-08
Attending: INTERNAL MEDICINE
Payer: MEDICAID

## 2019-02-08 DIAGNOSIS — M54.50 CHRONIC MIDLINE LOW BACK PAIN WITHOUT SCIATICA: ICD-10-CM

## 2019-02-08 DIAGNOSIS — G89.29 CHRONIC MIDLINE LOW BACK PAIN WITHOUT SCIATICA: ICD-10-CM

## 2019-02-08 PROCEDURE — 97012 MECHANICAL TRACTION THERAPY: CPT

## 2019-02-08 PROCEDURE — 97110 THERAPEUTIC EXERCISES: CPT

## 2019-02-14 ENCOUNTER — PHYSICAL THERAPY (OUTPATIENT)
Dept: PHYSICAL THERAPY | Facility: REHABILITATION | Age: 64
End: 2019-02-14
Attending: INTERNAL MEDICINE
Payer: MEDICAID

## 2019-02-14 DIAGNOSIS — M54.50 CHRONIC MIDLINE LOW BACK PAIN WITHOUT SCIATICA: ICD-10-CM

## 2019-02-14 DIAGNOSIS — G89.29 CHRONIC MIDLINE LOW BACK PAIN WITHOUT SCIATICA: ICD-10-CM

## 2019-02-14 PROCEDURE — 97012 MECHANICAL TRACTION THERAPY: CPT

## 2019-02-14 PROCEDURE — 97110 THERAPEUTIC EXERCISES: CPT

## 2019-02-14 NOTE — OP THERAPY DAILY TREATMENT
"  Outpatient Physical Therapy  DAILY TREATMENT     [facility] Physical [address]  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 Very happy with how the back has done. \"Usually I would be crippled, but I just have a little increased pain now.\" Attributes the most relief to traction.     OBJECTIVE:      Therapeutic Exercises (CPT 59024):     1. NuStep, L2 x 5 min    2. SKTC, x 1 min ea    3. LTR, x 20 ea    4. Cat/cow, x 10 ea way    5. Kyle pose, x 30\" ea way    6. Standard bridge, x 10    7. Marching bridge, x 15 ea    8. Side plank, 2 x 30\"      Therapeutic Exercise Summary: Brief IASTM to the R lumbar and lat. GII-III lumbar rotation.     Therapeutic Treatments and Modalities:     1. Mechanical Traction (CPT 50064), 70/40#, 60/20\" with MH x 15 min    Time-based treatments/modalities:  Therapeutic exercise minutes (CPT 77282): 30 minutes     ASSESSMENT:   Response to treatment: Pt is progressing well.  Improving function and most relief with traction.  Did emphasis today the importance of continuing HEP to develop longterm indep in self management.     PLAN/RECOMMENDATIONS:   Plan for treatment: therapy treatment to continue next visit.  Planned interventions for next visit: continue with current treatment.      "

## 2019-02-28 ENCOUNTER — PHYSICAL THERAPY (OUTPATIENT)
Dept: PHYSICAL THERAPY | Facility: REHABILITATION | Age: 64
End: 2019-02-28
Attending: INTERNAL MEDICINE
Payer: MEDICAID

## 2019-02-28 DIAGNOSIS — G89.29 CHRONIC MIDLINE LOW BACK PAIN WITHOUT SCIATICA: ICD-10-CM

## 2019-02-28 DIAGNOSIS — M54.50 CHRONIC MIDLINE LOW BACK PAIN WITHOUT SCIATICA: ICD-10-CM

## 2019-02-28 PROCEDURE — 97012 MECHANICAL TRACTION THERAPY: CPT

## 2019-02-28 PROCEDURE — 97110 THERAPEUTIC EXERCISES: CPT

## 2019-03-01 NOTE — OP THERAPY DAILY TREATMENT
"  Outpatient Physical Therapy  DAILY TREATMENT     [facility] Physical Therapy [address]  [address]  [place] [address]  Phone:  [phone]  Fax:  [phone]    Date: 02/28/2019    Patient: Berlin Clifford  YOB: 1955  MRN: [id number]     Time Calculation  Start time: 1607  Stop time: 1652 Time Calculation (min): 45 minutes     Chief Complaint: Back Problem    Visit #: 5    SUBJECTIVE:  Pt arrives 6 min late for today's session.  States his back continues to improve, despite being active with work.  Feels the txn helps the most, but is doing HEP to compliment.     OBJECTIVE:      Therapeutic Exercises (CPT 38796):     2. SKTC/KTOS, x 1 min ea    3. LTR, x 20 ea    4. Cat/cow, x 10 ea way    5. Kyle pose, x 30\" ea way    7. Marching bridge, x 15 ea    8. Side plank, 2 x 30\"    9. TB pullback, L2 x 20    10. Multif pressout, L2 x 15 ea      Therapeutic Exercise Summary: Brief IASTM to the R lumbar and lat. GII-III lumbar rotation.     Therapeutic Treatments and Modalities:     1. Mechanical Traction (CPT 16036), 70/40#, 60/20\" with MH x 15 min    Time-based treatments/modalities:  Therapeutic exercise minutes (CPT 11159): 24 minutes       ASSESSMENT:   Response to treatment: Pt better able at identifying and targeting imbalances. Improving work tolerance with better carryover than anticipated.     PLAN/RECOMMENDATIONS:   Plan for treatment: therapy treatment to continue next visit.  Planned interventions for next visit: continue with current treatment.      "

## 2019-03-08 ENCOUNTER — PHYSICAL THERAPY (OUTPATIENT)
Dept: PHYSICAL THERAPY | Facility: REHABILITATION | Age: 64
End: 2019-03-08
Attending: INTERNAL MEDICINE
Payer: MEDICAID

## 2019-03-08 DIAGNOSIS — G89.29 CHRONIC MIDLINE LOW BACK PAIN WITHOUT SCIATICA: ICD-10-CM

## 2019-03-08 DIAGNOSIS — M54.50 CHRONIC MIDLINE LOW BACK PAIN WITHOUT SCIATICA: ICD-10-CM

## 2019-03-08 PROCEDURE — 97012 MECHANICAL TRACTION THERAPY: CPT

## 2019-03-08 PROCEDURE — 97110 THERAPEUTIC EXERCISES: CPT

## 2019-03-08 NOTE — OP THERAPY DISCHARGE SUMMARY
Outpatient Physical Therapy  DISCHARGE SUMMARY NOTE      [facility] Physical Therapy [facility]  [address]  [address]  [place] [address]  Phone:  [phone]  Fax:  [phone]    Date of Visit: 03/08/2019    Patient: Berlin Clifford  YOB: 1955  MRN: [id number]     Referring Provider: Daja Blevins M.D.  [address] [facility]  [address]  [place] [address]   Referring Diagnosis Low back pain [M54.5];Other chronic pain [G89.29]     Physical Therapy Occurrence Codes    Date of onset of impairment:  1/9/17   Date physical therapy care plan established or reviewed:  1/9/19   Date physical therapy treatment started:  1/9/19          Functional Limitations and Severity Modifiers  Luke Gilson Low Back Pain and Disability Score: 37.5      Your patient is being discharged from Physical Therapy with the following comments:   · Goals met    Comments:  Mr. Clifford was seen for 6 PT sessions treating his LBP.  Overall, he was participatory and progressed well.  Pt reports 50% improvement with decreased pain following work days, no interruption with sleep quality and no longer getting pain when he rolls in bed.  He gets relief from hip stretching, general lumbar mobility, light core stab and traction.  Does continue to have increased pain after heavy lifting demands at work, but feels more confident getting himself back on track with HEP.  Lifting mechanics are limited by L LE contracture/dysfunction and will likely be an ongoing issue he will need to manage.     Recommendations:  No further skilled guidance is required at this time.  May return with new Rx if indicated.     Andreina Chang, PT, DPT    Date: 3/8/2019

## 2019-04-28 NOTE — OP THERAPY DAILY TREATMENT
"  Outpatient Physical Therapy  DAILY TREATMENT     [facility] [address]  [address]  [place] [address]  Phone:  [phone]  Fax:  [phone]    Date: 2019    Patient: Berlin Clifford  YOB: 1955  MRN: [id number]     Time Calculation  Start time: 1032  Stop time: 1120 Time Calculation (min): 48 minutes     Chief Complaint: Back Problem    Visit #: 6    SUBJECTIVE:  Busy with work the last 6 days.  Some increased pain after work yesterday, but ok by morning.     OBJECTIVE:Luke Gilson Low Back Pain and Disability Score: 37.5     Therapeutic Exercises (CPT 66078):     2. SKTC/KTOS, x 1 min ea    3. LTR, x 20 ea    4. Cat/cow, x 10 ea way    5. Kyle pose, x 30\" ea way    7. Marching bridge, x 15 ea    8. Side plank, 2 x 30\"    9. TB pullback, L2 x 20    10. Multif pressout, L2 x 15 ea      Therapeutic Exercise Summary: Brief IASTM to the R lumbar and lat. GII-III lumbar rotation.     Therapeutic Treatments and Modalities:     1. Mechanical Traction (CPT 37462), 70/40#, 60/20\" with MH x 15 min    Time-based treatments/modalities:  Therapeutic exercise minutes (CPT 14709): 25 minutes       ASSESSMENT:   Response to treatment: See d/c note.    PLAN/RECOMMENDATIONS:   Plan for treatment: Authorization . no further treatment needed.        " negative...

## 2019-06-19 ENCOUNTER — OFFICE VISIT (OUTPATIENT)
Dept: MEDICAL GROUP | Facility: MEDICAL CENTER | Age: 64
End: 2019-06-19
Attending: INTERNAL MEDICINE
Payer: MEDICAID

## 2019-06-19 ENCOUNTER — HOSPITAL ENCOUNTER (OUTPATIENT)
Dept: LAB | Facility: MEDICAL CENTER | Age: 64
End: 2019-06-19
Attending: INTERNAL MEDICINE
Payer: MEDICAID

## 2019-06-19 VITALS
HEART RATE: 62 BPM | HEIGHT: 63 IN | TEMPERATURE: 97.9 F | WEIGHT: 151 LBS | DIASTOLIC BLOOD PRESSURE: 78 MMHG | RESPIRATION RATE: 16 BRPM | SYSTOLIC BLOOD PRESSURE: 124 MMHG | OXYGEN SATURATION: 94 % | BODY MASS INDEX: 26.75 KG/M2

## 2019-06-19 DIAGNOSIS — E78.2 MIXED HYPERLIPIDEMIA: ICD-10-CM

## 2019-06-19 DIAGNOSIS — E11.9 TYPE 2 DIABETES MELLITUS WITHOUT COMPLICATION, WITHOUT LONG-TERM CURRENT USE OF INSULIN (HCC): ICD-10-CM

## 2019-06-19 DIAGNOSIS — I10 ESSENTIAL HYPERTENSION: ICD-10-CM

## 2019-06-19 DIAGNOSIS — Z12.11 SCREEN FOR COLON CANCER: ICD-10-CM

## 2019-06-19 DIAGNOSIS — R39.15 URINARY URGENCY: ICD-10-CM

## 2019-06-19 LAB
ALBUMIN SERPL BCP-MCNC: 4 G/DL (ref 3.2–4.9)
ALBUMIN/GLOB SERPL: 1.7 G/DL
ALP SERPL-CCNC: 66 U/L (ref 30–99)
ALT SERPL-CCNC: 24 U/L (ref 2–50)
ANION GAP SERPL CALC-SCNC: 8 MMOL/L (ref 0–11.9)
APPEARANCE UR: CLEAR
AST SERPL-CCNC: 19 U/L (ref 12–45)
BILIRUB SERPL-MCNC: 0.8 MG/DL (ref 0.1–1.5)
BILIRUB UR STRIP-MCNC: NEGATIVE MG/DL
BUN SERPL-MCNC: 23 MG/DL (ref 8–22)
CALCIUM SERPL-MCNC: 8.8 MG/DL (ref 8.5–10.5)
CHLORIDE SERPL-SCNC: 105 MMOL/L (ref 96–112)
CHOLEST SERPL-MCNC: 192 MG/DL (ref 100–199)
CO2 SERPL-SCNC: 25 MMOL/L (ref 20–33)
COLOR UR AUTO: YELLOW
CREAT SERPL-MCNC: 0.8 MG/DL (ref 0.5–1.4)
CREAT UR-MCNC: 119.4 MG/DL
FASTING STATUS PATIENT QL REPORTED: NORMAL
GLOBULIN SER CALC-MCNC: 2.4 G/DL (ref 1.9–3.5)
GLUCOSE SERPL-MCNC: 174 MG/DL (ref 65–99)
GLUCOSE UR STRIP.AUTO-MCNC: NORMAL MG/DL
HBA1C MFR BLD: 8.1 % (ref 0–5.6)
HDLC SERPL-MCNC: 52 MG/DL
INT CON NEG: NEGATIVE
INT CON POS: POSITIVE
KETONES UR STRIP.AUTO-MCNC: NORMAL MG/DL
LDLC SERPL CALC-MCNC: 110 MG/DL
LEUKOCYTE ESTERASE UR QL STRIP.AUTO: NEGATIVE
MICROALBUMIN UR-MCNC: <0.7 MG/DL
MICROALBUMIN/CREAT UR: NORMAL MG/G (ref 0–30)
NITRITE UR QL STRIP.AUTO: NEGATIVE
PH UR STRIP.AUTO: 6 [PH] (ref 5–8)
POTASSIUM SERPL-SCNC: 4.4 MMOL/L (ref 3.6–5.5)
PROT SERPL-MCNC: 6.4 G/DL (ref 6–8.2)
PROT UR QL STRIP: NEGATIVE MG/DL
PSA SERPL-MCNC: 1.34 NG/ML (ref 0–4)
RBC UR QL AUTO: NEGATIVE
SODIUM SERPL-SCNC: 138 MMOL/L (ref 135–145)
SP GR UR STRIP.AUTO: 1.01
TRIGL SERPL-MCNC: 149 MG/DL (ref 0–149)
UROBILINOGEN UR STRIP-MCNC: 0.2 MG/DL

## 2019-06-19 PROCEDURE — 36415 COLL VENOUS BLD VENIPUNCTURE: CPT

## 2019-06-19 PROCEDURE — 84153 ASSAY OF PSA TOTAL: CPT

## 2019-06-19 PROCEDURE — 99213 OFFICE O/P EST LOW 20 MIN: CPT | Performed by: INTERNAL MEDICINE

## 2019-06-19 PROCEDURE — 81002 URINALYSIS NONAUTO W/O SCOPE: CPT | Performed by: INTERNAL MEDICINE

## 2019-06-19 PROCEDURE — 80053 COMPREHEN METABOLIC PANEL: CPT

## 2019-06-19 PROCEDURE — 82043 UR ALBUMIN QUANTITATIVE: CPT

## 2019-06-19 PROCEDURE — 83036 HEMOGLOBIN GLYCOSYLATED A1C: CPT | Performed by: INTERNAL MEDICINE

## 2019-06-19 PROCEDURE — 80061 LIPID PANEL: CPT

## 2019-06-19 PROCEDURE — 82570 ASSAY OF URINE CREATININE: CPT

## 2019-06-19 PROCEDURE — 99214 OFFICE O/P EST MOD 30 MIN: CPT | Performed by: INTERNAL MEDICINE

## 2019-06-19 RX ORDER — ENALAPRIL MALEATE 2.5 MG/1
2.5 TABLET ORAL DAILY
Qty: 30 TAB | Refills: 6 | Status: SHIPPED | OUTPATIENT
Start: 2019-06-19 | End: 2020-03-31 | Stop reason: SDUPTHER

## 2019-06-19 RX ORDER — SIMVASTATIN 20 MG
20 TABLET ORAL EVERY EVENING
Qty: 30 TAB | Refills: 6 | Status: SHIPPED | OUTPATIENT
Start: 2019-06-19 | End: 2019-06-20

## 2019-06-19 RX ORDER — GLIMEPIRIDE 4 MG/1
4 TABLET ORAL 2 TIMES DAILY
Qty: 60 TAB | Refills: 2 | Status: SHIPPED | OUTPATIENT
Start: 2019-06-19 | End: 2019-10-04 | Stop reason: SDUPTHER

## 2019-06-19 ASSESSMENT — PAIN SCALES - GENERAL: PAINLEVEL: NO PAIN

## 2019-06-19 NOTE — ASSESSMENT & PLAN NOTE
He presents for 6 months follow-up of diabetes.  He states that he is taking his metformin and glimepiride regularly and not having any side effects.  He denies any recent changes in his diet however he is eating 2 tortillas in the morning and 4 tortillas for lunch.  He is a little bit of rice for dinner as well as meat.  His A1c in clinic today has gone up from 7.4 at last visit to 8.1.  He does report increased urinary urgency although he denies increased frequency.  See discussion below.

## 2019-06-19 NOTE — ASSESSMENT & PLAN NOTE
He reports that for the past month, he has had increased urinary urgency.  He denies increased urinary frequency.  He does not have any episodes of nocturia at night.  He does report drinking more water as it has been warmer out.  He has not been checking blood sugars regularly.  He denies dysuria, hematuria.  Does not feel like he has to strain or push to get the urine out and he denies weakened stream.  States that if he does not get to the bathroom in time he will have a little bit of dribbling of urine but denies robinson incontinence.  He is not having any worsening back pain and in fact reports his back is feeling better.

## 2019-06-19 NOTE — PROGRESS NOTES
Subjective:   Berlin Clifford is a 63 y.o. male here today for follow-up diabetes, hypertension, increased urinary urgency    Type 2 diabetes mellitus without complication (HCC)  He presents for 6 months follow-up of diabetes.  He states that he is taking his metformin and glimepiride regularly and not having any side effects.  He denies any recent changes in his diet however he is eating 2 tortillas in the morning and 4 tortillas for lunch.  He is a little bit of rice for dinner as well as meat.  His A1c in clinic today has gone up from 7.4 at last visit to 8.1.  He does report increased urinary urgency although he denies increased frequency.  See discussion below.    Urinary urgency  He reports that for the past month, he has had increased urinary urgency.  He denies increased urinary frequency.  He does not have any episodes of nocturia at night.  He does report drinking more water as it has been warmer out.  He has not been checking blood sugars regularly.  He denies dysuria, hematuria.  Does not feel like he has to strain or push to get the urine out and he denies weakened stream.  States that if he does not get to the bathroom in time he will have a little bit of dribbling of urine but denies robinson incontinence.  He is not having any worsening back pain and in fact reports his back is feeling better.    Essential hypertension  He continues on enalapril 2.5 mg daily.  His blood pressure is well controlled in clinic today at 124/78.    Mixed hyperlipidemia  He continues on simvastatin 20 mg daily.  He is due for repeat lipid panel.       Current medicines (including changes today)  Current Outpatient Prescriptions   Medication Sig Dispense Refill   • glimepiride (AMARYL) 4 MG Tab Take 1 Tab by mouth 2 times a day. 60 Tab 2   • enalapril (VASOTEC) 2.5 MG Tab Take 1 Tab by mouth every day. 30 Tab 6   • metformin (GLUCOPHAGE) 1000 MG tablet TAKE 1 TABLET BY MOUTH 2 TIMES DAILY FOR DIABETES 60 Tab 6   •  simvastatin (ZOCOR) 20 MG Tab Take 1 Tab by mouth every evening. 30 Tab 6   • aspirin 81 MG EC tablet Take 1 Tab by mouth every day. TAKE 1 TABLET BY MOUTH DAILY 90 Tab 3     No current facility-administered medications for this visit.      He  has a past medical history of Corneal transplant status; Diabetes (HCC); Hyperlipidemia; and Hypertension.    ROS   Denies chest pain, shortness of breath  As above in HPI     Objective:     Vitals:    06/19/19 1000   BP: 124/78   Pulse: 62   Resp: 16   Temp: 36.6 °C (97.9 °F)   SpO2: 94%     Body mass index is 27.16 kg/m².   Physical Exam:  Constitutional: Alert, no distress.  Skin: Warm, dry, good turgor, no rashes in visible areas.  Eye: Equal, round and reactive, conjunctiva clear, lids normal.  Cardiovascular: Regular rate and rhythm, no murmurs appreciated, no lower extremity edema  Abdomen: Soft, non-tender, no masses, no hepatosplenomegaly.  Psych: Alert and oriented x3, normal affect and mood.      Results and Imaging:   POC UA in clinic today showed trace ketones, 500 glucose, negative blood leukoesterase and nitrate    POC A1c in clinic today was 8.1    Assessment and Plan:   The following treatment plan was discussed    1. Type 2 diabetes mellitus without complication, without long-term current use of insulin (HCC)  Control has worsened since last visit 6 months ago.  Suspect secondary to dietary indiscretion with increased carbohydrate intake.  We discussed reducing his tortilla intake down from 6 a day to 2-3.  In the meantime we will have him start the glimepiride twice daily in addition to the metformin twice daily.  He will follow-up in 3 months for repeat A1c.  - POCT  A1C  - glimepiride (AMARYL) 4 MG Tab; Take 1 Tab by mouth 2 times a day.  Dispense: 60 Tab; Refill: 2  - Lipid Profile; Future  - MICROALBUMIN CREAT RATIO URINE (LAB COLLECT); Future  - metformin (GLUCOPHAGE) 1000 MG tablet; TAKE 1 TABLET BY MOUTH 2 TIMES DAILY FOR DIABETES  Dispense: 60 Tab;  Refill: 6  -Follow-up in 3 months for A1c    2. Urinary urgency  No evidence of UTI on UA.  He is not having symptoms concerning for BPH for the most part however we will still check a PSA given there has been a change in his ability to urinate.  Suspect his urgency is more related to poor control of his diabetes given his glucosuria, possibly exacerbated by his water intake lately in the heat.  We will shoot for better diabetes control as above.  Will revisit at follow-up to see if symptoms have resolved if A1c improves  - POCT Urinalysis  - PROSTATE SPECIFIC AG DIAGNOSTIC; Future  -Discussed symptoms at follow-up, expect improvement with improvement in A1c    3. Screen for colon cancer  - OCCULT BLOOD FECES IMMUNOASSAY; Future    4. Essential hypertension  Stable, well-controlled with current medications which were refilled today  - enalapril (VASOTEC) 2.5 MG Tab; Take 1 Tab by mouth every day.  Dispense: 30 Tab; Refill: 6    5. Mixed hyperlipidemia  Stable, well-controlled with current medications which were refilled today.  He is also due for an updated lipid panel which I ordered today  - simvastatin (ZOCOR) 20 MG Tab; Take 1 Tab by mouth every evening.  Dispense: 30 Tab; Refill: 6  - Lipid Profile; Future      Followup: Return in about 3 months (around 9/19/2019), or if symptoms worsen or fail to improve, for diabetes.

## 2019-06-19 NOTE — ASSESSMENT & PLAN NOTE
He continues on enalapril 2.5 mg daily.  His blood pressure is well controlled in clinic today at 124/78.

## 2019-06-20 RX ORDER — ATORVASTATIN CALCIUM 40 MG/1
40 TABLET, FILM COATED ORAL DAILY
Qty: 30 TAB | Refills: 5 | Status: SHIPPED | OUTPATIENT
Start: 2019-06-20 | End: 2019-12-24

## 2019-06-20 NOTE — PROGRESS NOTES
Based on lab results, comorbid diabetes, lipids not at goal, will switch him from simvastatin to atorvastatin.    Daja Blevins M.D.

## 2019-07-01 ENCOUNTER — HOSPITAL ENCOUNTER (OUTPATIENT)
Facility: MEDICAL CENTER | Age: 64
End: 2019-07-01
Attending: INTERNAL MEDICINE
Payer: MEDICAID

## 2019-07-01 PROCEDURE — 82274 ASSAY TEST FOR BLOOD FECAL: CPT

## 2019-07-09 DIAGNOSIS — Z12.11 SCREEN FOR COLON CANCER: ICD-10-CM

## 2019-07-09 LAB — HEMOCCULT STL QL IA: NEGATIVE

## 2019-10-04 ENCOUNTER — OFFICE VISIT (OUTPATIENT)
Dept: MEDICAL GROUP | Facility: MEDICAL CENTER | Age: 64
End: 2019-10-04
Attending: INTERNAL MEDICINE
Payer: MEDICARE

## 2019-10-04 VITALS
SYSTOLIC BLOOD PRESSURE: 112 MMHG | OXYGEN SATURATION: 97 % | DIASTOLIC BLOOD PRESSURE: 78 MMHG | WEIGHT: 147 LBS | HEART RATE: 64 BPM | RESPIRATION RATE: 16 BRPM | HEIGHT: 63 IN | TEMPERATURE: 98.3 F | BODY MASS INDEX: 26.05 KG/M2

## 2019-10-04 DIAGNOSIS — E78.2 MIXED HYPERLIPIDEMIA: ICD-10-CM

## 2019-10-04 DIAGNOSIS — E11.9 TYPE 2 DIABETES MELLITUS WITHOUT COMPLICATION, WITHOUT LONG-TERM CURRENT USE OF INSULIN (HCC): ICD-10-CM

## 2019-10-04 DIAGNOSIS — I10 ESSENTIAL HYPERTENSION: ICD-10-CM

## 2019-10-04 PROBLEM — R39.15 URINARY URGENCY: Status: RESOLVED | Noted: 2019-06-19 | Resolved: 2019-10-04

## 2019-10-04 LAB
HBA1C MFR BLD: 6.9 % (ref 0–5.6)
INT CON NEG: NEGATIVE
INT CON POS: POSITIVE

## 2019-10-04 PROCEDURE — 99214 OFFICE O/P EST MOD 30 MIN: CPT | Performed by: INTERNAL MEDICINE

## 2019-10-04 PROCEDURE — 83036 HEMOGLOBIN GLYCOSYLATED A1C: CPT | Performed by: INTERNAL MEDICINE

## 2019-10-04 PROCEDURE — 99213 OFFICE O/P EST LOW 20 MIN: CPT | Performed by: INTERNAL MEDICINE

## 2019-10-04 RX ORDER — SIMVASTATIN 20 MG
20 TABLET ORAL
Refills: 6 | COMMUNITY
Start: 2019-08-30 | End: 2019-12-24

## 2019-10-04 RX ORDER — GLIMEPIRIDE 4 MG/1
4 TABLET ORAL 2 TIMES DAILY
Qty: 60 TAB | Refills: 5 | Status: SHIPPED | OUTPATIENT
Start: 2019-10-04 | End: 2020-07-31

## 2019-10-04 ASSESSMENT — PATIENT HEALTH QUESTIONNAIRE - PHQ9: CLINICAL INTERPRETATION OF PHQ2 SCORE: 0

## 2019-10-04 ASSESSMENT — PAIN SCALES - GENERAL: PAINLEVEL: NO PAIN

## 2019-10-04 NOTE — ASSESSMENT & PLAN NOTE
He continues on the enalapril 2.5 mg daily with excellent blood pressure control.  He does not monitor at home but has always been well controlled in clinic.  Today he is at 112/78.  He denies chest pain, shortness of breath, headaches, palpitations.

## 2019-10-04 NOTE — PROGRESS NOTES
Subjective:   Berlin Clifford is a 63 y.o. male here today for follow-up diabetes    Type 2 diabetes mellitus without complication (HCC)  He presents today for follow-up on his diabetes.  At his last visit, his A1c had gone up to 8.1.  We did increase his glimepiride to twice daily.  He he continues on the metformin twice daily as well.  States he has been tolerating these medications well without any side effects.  He does continue to eat about 6 tortillas per day.  He denies any hypoglycemic episodes.  In clinic today his A1c is 6.9.    Essential hypertension  He continues on the enalapril 2.5 mg daily with excellent blood pressure control.  He does not monitor at home but has always been well controlled in clinic.  Today he is at 112/78.  He denies chest pain, shortness of breath, headaches, palpitations.    Mixed hyperlipidemia  He continues on the simvastatin 20 mg daily.  His last lipid panel was checked 3 months ago and was at goal.  He tolerates the medication well without any side effects.       Current medicines (including changes today)  Current Outpatient Medications   Medication Sig Dispense Refill   • simvastatin (ZOCOR) 20 MG Tab Take 20 mg by mouth every day. N THE EVENING  6   • glimepiride (AMARYL) 4 MG Tab Take 1 Tab by mouth 2 times a day. 60 Tab 5   • atorvastatin (LIPITOR) 40 MG Tab Take 1 Tab by mouth every day. To replace simvastatin 30 Tab 5   • enalapril (VASOTEC) 2.5 MG Tab Take 1 Tab by mouth every day. 30 Tab 6   • metformin (GLUCOPHAGE) 1000 MG tablet TAKE 1 TABLET BY MOUTH 2 TIMES DAILY FOR DIABETES 60 Tab 6   • aspirin 81 MG EC tablet Take 1 Tab by mouth every day. TAKE 1 TABLET BY MOUTH DAILY 90 Tab 3     No current facility-administered medications for this visit.      He  has a past medical history of Corneal transplant status, Diabetes (HCC), Hyperlipidemia, and Hypertension.    ROS   Denies chest pain, shortness of breath  As above in HPI     Objective:     Vitals:     10/04/19 0734   BP: 112/78   Pulse: 64   Resp: 16   Temp: 36.8 °C (98.3 °F)   SpO2: 97%     Body mass index is 26.44 kg/m².   Physical Exam:  Constitutional: Alert, no distress.  Skin: Warm, dry, good turgor, no rashes in visible areas.  Eye: Equal, round and reactive, conjunctiva clear, lids normal.  Psych: Alert and oriented x3, normal affect and mood.      Results and Imaging:   POC A1c in clinic today was 6.9    Assessment and Plan:   The following treatment plan was discussed    1. Type 2 diabetes mellitus without complication, without long-term current use of insulin (HCC)  Control has improved compared to last month after medication adjustment.  We will continue current medications which were refilled today  -Metformin 1000 mg twice daily  - POCT  A1C  - glimepiride (AMARYL) 4 MG Tab; Take 1 Tab by mouth 2 times a day.  Dispense: 60 Tab; Refill: 5    2. Essential hypertension  Stable, well-controlled with current meds  -Enalapril 2.5 mg daily    3. Mixed hyperlipidemia  Stable, well-controlled with current meds  - simvastatin (ZOCOR) 20 MG Tab; Take 20 mg by mouth every day. N THE EVENING; Refill: 6        Followup: Return in about 4 months (around 2/4/2020), or if symptoms worsen or fail to improve, for diabetes, hypertension.

## 2019-10-04 NOTE — ASSESSMENT & PLAN NOTE
He presents today for follow-up on his diabetes.  At his last visit, his A1c had gone up to 8.1.  We did increase his glimepiride to twice daily.  He he continues on the metformin twice daily as well.  States he has been tolerating these medications well without any side effects.  He does continue to eat about 6 tortillas per day.  He denies any hypoglycemic episodes.  In clinic today his A1c is 6.9.

## 2019-10-04 NOTE — ASSESSMENT & PLAN NOTE
He continues on the simvastatin 20 mg daily.  His last lipid panel was checked 3 months ago and was at goal.  He tolerates the medication well without any side effects.

## 2019-12-24 RX ORDER — ATORVASTATIN CALCIUM 40 MG/1
40 TABLET, FILM COATED ORAL DAILY
Qty: 30 TAB | Refills: 11 | Status: SHIPPED | OUTPATIENT
Start: 2019-12-24 | End: 2020-08-15

## 2020-01-07 RX ORDER — ASPIRIN 81 MG/1
TABLET ORAL
Qty: 90 TAB | Refills: 3 | Status: SHIPPED | OUTPATIENT
Start: 2020-01-07 | End: 2020-08-15

## 2020-03-30 DIAGNOSIS — E11.9 TYPE 2 DIABETES MELLITUS WITHOUT COMPLICATION, WITHOUT LONG-TERM CURRENT USE OF INSULIN (HCC): ICD-10-CM

## 2020-03-30 NOTE — TELEPHONE ENCOUNTER
Received request via: Pharmacy    Was the patient seen in the last year in this department? Yes    Does the patient have an active prescription (recently filled or refills available) for medication(s) requested? No     Would like a 90 day supply

## 2020-03-31 DIAGNOSIS — I10 ESSENTIAL HYPERTENSION: ICD-10-CM

## 2020-04-01 RX ORDER — ENALAPRIL MALEATE 2.5 MG/1
2.5 TABLET ORAL DAILY
Qty: 30 TAB | Refills: 5 | Status: SHIPPED | OUTPATIENT
Start: 2020-04-01 | End: 2020-08-15

## 2020-07-31 DIAGNOSIS — E11.9 TYPE 2 DIABETES MELLITUS WITHOUT COMPLICATION, WITHOUT LONG-TERM CURRENT USE OF INSULIN (HCC): ICD-10-CM

## 2020-08-03 RX ORDER — GLIMEPIRIDE 4 MG/1
TABLET ORAL
Qty: 180 TAB | Refills: 0 | Status: SHIPPED | OUTPATIENT
Start: 2020-08-03 | End: 2020-08-15

## 2020-08-13 ENCOUNTER — HOSPITAL ENCOUNTER (INPATIENT)
Facility: MEDICAL CENTER | Age: 65
LOS: 2 days | DRG: 339 | End: 2020-08-15
Attending: EMERGENCY MEDICINE | Admitting: SURGERY
Payer: MEDICARE

## 2020-08-13 ENCOUNTER — ANESTHESIA EVENT (OUTPATIENT)
Dept: SURGERY | Facility: MEDICAL CENTER | Age: 65
DRG: 339 | End: 2020-08-13
Payer: MEDICARE

## 2020-08-13 ENCOUNTER — APPOINTMENT (OUTPATIENT)
Dept: RADIOLOGY | Facility: MEDICAL CENTER | Age: 65
DRG: 339 | End: 2020-08-13
Attending: EMERGENCY MEDICINE
Payer: MEDICARE

## 2020-08-13 ENCOUNTER — ANESTHESIA (OUTPATIENT)
Dept: SURGERY | Facility: MEDICAL CENTER | Age: 65
DRG: 339 | End: 2020-08-13
Payer: MEDICARE

## 2020-08-13 DIAGNOSIS — K35.33 ACUTE APPENDICITIS WITH PERFORATION, LOCALIZED PERITONITIS, AND ABSCESS, WITHOUT GANGRENE: ICD-10-CM

## 2020-08-13 DIAGNOSIS — G89.18 POST-OPERATIVE PAIN: ICD-10-CM

## 2020-08-13 LAB
ALBUMIN SERPL BCP-MCNC: 3.5 G/DL (ref 3.2–4.9)
ALBUMIN/GLOB SERPL: 1 G/DL
ALP SERPL-CCNC: 90 U/L (ref 30–99)
ALT SERPL-CCNC: 25 U/L (ref 2–50)
ANION GAP SERPL CALC-SCNC: 12 MMOL/L (ref 7–16)
APPEARANCE UR: CLEAR
APTT PPP: 33.8 SEC (ref 24.7–36)
AST SERPL-CCNC: 24 U/L (ref 12–45)
BASOPHILS # BLD AUTO: 0.6 % (ref 0–1.8)
BASOPHILS # BLD: 0.06 K/UL (ref 0–0.12)
BILIRUB SERPL-MCNC: 1.1 MG/DL (ref 0.1–1.5)
BILIRUB UR QL STRIP.AUTO: NEGATIVE
BUN SERPL-MCNC: 13 MG/DL (ref 8–22)
CALCIUM SERPL-MCNC: 8.7 MG/DL (ref 8.4–10.2)
CHLORIDE SERPL-SCNC: 99 MMOL/L (ref 96–112)
CO2 SERPL-SCNC: 22 MMOL/L (ref 20–33)
COLOR UR: YELLOW
COVID ORDER STATUS COVID19: NORMAL
CREAT SERPL-MCNC: 0.61 MG/DL (ref 0.5–1.4)
EOSINOPHIL # BLD AUTO: 0.06 K/UL (ref 0–0.51)
EOSINOPHIL NFR BLD: 0.6 % (ref 0–6.9)
ERYTHROCYTE [DISTWIDTH] IN BLOOD BY AUTOMATED COUNT: 40.8 FL (ref 35.9–50)
GLOBULIN SER CALC-MCNC: 3.5 G/DL (ref 1.9–3.5)
GLUCOSE BLD-MCNC: 95 MG/DL (ref 65–99)
GLUCOSE SERPL-MCNC: 139 MG/DL (ref 65–99)
GLUCOSE UR STRIP.AUTO-MCNC: 250 MG/DL
HCT VFR BLD AUTO: 45.8 % (ref 42–52)
HGB BLD-MCNC: 16 G/DL (ref 14–18)
IMM GRANULOCYTES # BLD AUTO: 0.07 K/UL (ref 0–0.11)
IMM GRANULOCYTES NFR BLD AUTO: 0.7 % (ref 0–0.9)
INR PPP: 0.94 (ref 0.87–1.13)
KETONES UR STRIP.AUTO-MCNC: 15 MG/DL
LEUKOCYTE ESTERASE UR QL STRIP.AUTO: NEGATIVE
LIPASE SERPL-CCNC: 13 U/L (ref 7–58)
LYMPHOCYTES # BLD AUTO: 0.83 K/UL (ref 1–4.8)
LYMPHOCYTES NFR BLD: 8.8 % (ref 22–41)
MCH RBC QN AUTO: 30.5 PG (ref 27–33)
MCHC RBC AUTO-ENTMCNC: 34.9 G/DL (ref 33.7–35.3)
MCV RBC AUTO: 87.2 FL (ref 81.4–97.8)
MICRO URNS: ABNORMAL
MONOCYTES # BLD AUTO: 0.81 K/UL (ref 0–0.85)
MONOCYTES NFR BLD AUTO: 8.6 % (ref 0–13.4)
MUCOUS THREADS #/AREA URNS HPF: ABNORMAL /HPF
NEUTROPHILS # BLD AUTO: 7.61 K/UL (ref 1.82–7.42)
NEUTROPHILS NFR BLD: 80.7 % (ref 44–72)
NITRITE UR QL STRIP.AUTO: NEGATIVE
NRBC # BLD AUTO: 0 K/UL
NRBC BLD-RTO: 0 /100 WBC
PATHOLOGY CONSULT NOTE: NORMAL
PH UR STRIP.AUTO: 6 [PH] (ref 5–8)
PLATELET # BLD AUTO: 134 K/UL (ref 164–446)
PMV BLD AUTO: 10 FL (ref 9–12.9)
POTASSIUM SERPL-SCNC: 3.8 MMOL/L (ref 3.6–5.5)
PROT SERPL-MCNC: 7 G/DL (ref 6–8.2)
PROT UR QL STRIP: 30 MG/DL
PROTHROMBIN TIME: 12.7 SEC (ref 12–14.6)
RBC # BLD AUTO: 5.25 M/UL (ref 4.7–6.1)
RBC # URNS HPF: ABNORMAL /HPF
RBC UR QL AUTO: ABNORMAL
SARS-COV-2 RNA RESP QL NAA+PROBE: NOTDETECTED
SODIUM SERPL-SCNC: 133 MMOL/L (ref 135–145)
SP GR UR STRIP.AUTO: 1.01
SPECIMEN SOURCE: NORMAL
WBC # BLD AUTO: 9.4 K/UL (ref 4.8–10.8)
WBC #/AREA URNS HPF: ABNORMAL /HPF

## 2020-08-13 PROCEDURE — 87075 CULTR BACTERIA EXCEPT BLOOD: CPT

## 2020-08-13 PROCEDURE — 700101 HCHG RX REV CODE 250: Performed by: ANESTHESIOLOGY

## 2020-08-13 PROCEDURE — U0003 INFECTIOUS AGENT DETECTION BY NUCLEIC ACID (DNA OR RNA); SEVERE ACUTE RESPIRATORY SYNDROME CORONAVIRUS 2 (SARS-COV-2) (CORONAVIRUS DISEASE [COVID-19]), AMPLIFIED PROBE TECHNIQUE, MAKING USE OF HIGH THROUGHPUT TECHNOLOGIES AS DESCRIBED BY CMS-2020-01-R: HCPCS

## 2020-08-13 PROCEDURE — 700111 HCHG RX REV CODE 636 W/ 250 OVERRIDE (IP): Performed by: EMERGENCY MEDICINE

## 2020-08-13 PROCEDURE — 160009 HCHG ANES TIME/MIN: Performed by: SURGERY

## 2020-08-13 PROCEDURE — 501571 HCHG TROCAR, SEPARATOR 12X100: Performed by: SURGERY

## 2020-08-13 PROCEDURE — 81001 URINALYSIS AUTO W/SCOPE: CPT

## 2020-08-13 PROCEDURE — 160002 HCHG RECOVERY MINUTES (STAT): Performed by: SURGERY

## 2020-08-13 PROCEDURE — 700105 HCHG RX REV CODE 258: Performed by: EMERGENCY MEDICINE

## 2020-08-13 PROCEDURE — 700117 HCHG RX CONTRAST REV CODE 255: Performed by: EMERGENCY MEDICINE

## 2020-08-13 PROCEDURE — 160029 HCHG SURGERY MINUTES - 1ST 30 MINS LEVEL 4: Performed by: SURGERY

## 2020-08-13 PROCEDURE — 500389 HCHG DRAIN, RESERVOIR SUCT JP 100CC: Performed by: SURGERY

## 2020-08-13 PROCEDURE — 501450 HCHG STAPLES, ENDO MULTIFIRE: Performed by: SURGERY

## 2020-08-13 PROCEDURE — 501399 HCHG SPECIMAN BAG, ENDO CATC: Performed by: SURGERY

## 2020-08-13 PROCEDURE — 87186 SC STD MICRODIL/AGAR DIL: CPT

## 2020-08-13 PROCEDURE — 770006 HCHG ROOM/CARE - MED/SURG/GYN SEMI*

## 2020-08-13 PROCEDURE — 501583 HCHG TROCAR, THRD CAN&SEAL 5X100: Performed by: SURGERY

## 2020-08-13 PROCEDURE — 160048 HCHG OR STATISTICAL LEVEL 1-5: Performed by: SURGERY

## 2020-08-13 PROCEDURE — 96365 THER/PROPH/DIAG IV INF INIT: CPT

## 2020-08-13 PROCEDURE — 83690 ASSAY OF LIPASE: CPT

## 2020-08-13 PROCEDURE — 700102 HCHG RX REV CODE 250 W/ 637 OVERRIDE(OP): Performed by: SURGERY

## 2020-08-13 PROCEDURE — 87070 CULTURE OTHR SPECIMN AEROBIC: CPT

## 2020-08-13 PROCEDURE — 700105 HCHG RX REV CODE 258: Performed by: SURGERY

## 2020-08-13 PROCEDURE — 160041 HCHG SURGERY MINUTES - EA ADDL 1 MIN LEVEL 4: Performed by: SURGERY

## 2020-08-13 PROCEDURE — A9270 NON-COVERED ITEM OR SERVICE: HCPCS | Performed by: SURGERY

## 2020-08-13 PROCEDURE — 500378 HCHG DRAIN, J-VAC ROUND 19FR: Performed by: SURGERY

## 2020-08-13 PROCEDURE — 80053 COMPREHEN METABOLIC PANEL: CPT

## 2020-08-13 PROCEDURE — C9803 HOPD COVID-19 SPEC COLLECT: HCPCS | Performed by: EMERGENCY MEDICINE

## 2020-08-13 PROCEDURE — 500800 HCHG LAPAROSCOPIC J/L HOOK: Performed by: SURGERY

## 2020-08-13 PROCEDURE — 82962 GLUCOSE BLOOD TEST: CPT

## 2020-08-13 PROCEDURE — 85730 THROMBOPLASTIN TIME PARTIAL: CPT

## 2020-08-13 PROCEDURE — 700101 HCHG RX REV CODE 250: Performed by: EMERGENCY MEDICINE

## 2020-08-13 PROCEDURE — 85025 COMPLETE CBC W/AUTO DIFF WBC: CPT

## 2020-08-13 PROCEDURE — 501570 HCHG TROCAR, SEPARATOR: Performed by: SURGERY

## 2020-08-13 PROCEDURE — 88304 TISSUE EXAM BY PATHOLOGIST: CPT

## 2020-08-13 PROCEDURE — 87205 SMEAR GRAM STAIN: CPT

## 2020-08-13 PROCEDURE — 0DTJ4ZZ RESECTION OF APPENDIX, PERCUTANEOUS ENDOSCOPIC APPROACH: ICD-10-PCS | Performed by: SURGERY

## 2020-08-13 PROCEDURE — 96367 TX/PROPH/DG ADDL SEQ IV INF: CPT

## 2020-08-13 PROCEDURE — 160035 HCHG PACU - 1ST 60 MINS PHASE I: Performed by: SURGERY

## 2020-08-13 PROCEDURE — 700111 HCHG RX REV CODE 636 W/ 250 OVERRIDE (IP): Performed by: SURGERY

## 2020-08-13 PROCEDURE — 74177 CT ABD & PELVIS W/CONTRAST: CPT | Mod: MG

## 2020-08-13 PROCEDURE — 85610 PROTHROMBIN TIME: CPT

## 2020-08-13 PROCEDURE — 501838 HCHG SUTURE GENERAL: Performed by: SURGERY

## 2020-08-13 PROCEDURE — 700111 HCHG RX REV CODE 636 W/ 250 OVERRIDE (IP): Performed by: ANESTHESIOLOGY

## 2020-08-13 PROCEDURE — 87077 CULTURE AEROBIC IDENTIFY: CPT

## 2020-08-13 PROCEDURE — 700101 HCHG RX REV CODE 250: Performed by: SURGERY

## 2020-08-13 PROCEDURE — 36415 COLL VENOUS BLD VENIPUNCTURE: CPT

## 2020-08-13 PROCEDURE — 502571 HCHG PACK, LAP CHOLE: Performed by: SURGERY

## 2020-08-13 PROCEDURE — 99285 EMERGENCY DEPT VISIT HI MDM: CPT

## 2020-08-13 RX ORDER — OXYCODONE HCL 5 MG/5 ML
5 SOLUTION, ORAL ORAL
Status: DISCONTINUED | OUTPATIENT
Start: 2020-08-13 | End: 2020-08-13 | Stop reason: HOSPADM

## 2020-08-13 RX ORDER — ONDANSETRON 2 MG/ML
4 INJECTION INTRAMUSCULAR; INTRAVENOUS EVERY 4 HOURS PRN
Status: DISCONTINUED | OUTPATIENT
Start: 2020-08-13 | End: 2020-08-15 | Stop reason: HOSPADM

## 2020-08-13 RX ORDER — BUPIVACAINE HYDROCHLORIDE AND EPINEPHRINE 5; 5 MG/ML; UG/ML
INJECTION, SOLUTION PERINEURAL
Status: DISCONTINUED | OUTPATIENT
Start: 2020-08-13 | End: 2020-08-13 | Stop reason: HOSPADM

## 2020-08-13 RX ORDER — OXYCODONE HCL 5 MG/5 ML
10 SOLUTION, ORAL ORAL
Status: DISCONTINUED | OUTPATIENT
Start: 2020-08-13 | End: 2020-08-13 | Stop reason: HOSPADM

## 2020-08-13 RX ORDER — BISACODYL 10 MG
10 SUPPOSITORY, RECTAL RECTAL
Status: DISCONTINUED | OUTPATIENT
Start: 2020-08-13 | End: 2020-08-15 | Stop reason: HOSPADM

## 2020-08-13 RX ORDER — SUCCINYLCHOLINE/SOD CL,ISO/PF 200MG/10ML
SYRINGE (ML) INTRAVENOUS PRN
Status: DISCONTINUED | OUTPATIENT
Start: 2020-08-13 | End: 2020-08-13 | Stop reason: SURG

## 2020-08-13 RX ORDER — MEPERIDINE HYDROCHLORIDE 25 MG/ML
6.25 INJECTION INTRAMUSCULAR; INTRAVENOUS; SUBCUTANEOUS
Status: DISCONTINUED | OUTPATIENT
Start: 2020-08-13 | End: 2020-08-13 | Stop reason: HOSPADM

## 2020-08-13 RX ORDER — HYDROMORPHONE HYDROCHLORIDE 1 MG/ML
0.1 INJECTION, SOLUTION INTRAMUSCULAR; INTRAVENOUS; SUBCUTANEOUS
Status: DISCONTINUED | OUTPATIENT
Start: 2020-08-13 | End: 2020-08-13 | Stop reason: HOSPADM

## 2020-08-13 RX ORDER — MORPHINE SULFATE 4 MG/ML
4 INJECTION, SOLUTION INTRAMUSCULAR; INTRAVENOUS
Status: DISCONTINUED | OUTPATIENT
Start: 2020-08-13 | End: 2020-08-15 | Stop reason: HOSPADM

## 2020-08-13 RX ORDER — ROCURONIUM BROMIDE 10 MG/ML
INJECTION, SOLUTION INTRAVENOUS PRN
Status: DISCONTINUED | OUTPATIENT
Start: 2020-08-13 | End: 2020-08-13 | Stop reason: SURG

## 2020-08-13 RX ORDER — HALOPERIDOL 5 MG/ML
1 INJECTION INTRAMUSCULAR
Status: DISCONTINUED | OUTPATIENT
Start: 2020-08-13 | End: 2020-08-13 | Stop reason: HOSPADM

## 2020-08-13 RX ORDER — SODIUM CHLORIDE 9 MG/ML
INJECTION, SOLUTION INTRAVENOUS CONTINUOUS
Status: DISCONTINUED | OUTPATIENT
Start: 2020-08-13 | End: 2020-08-15 | Stop reason: HOSPADM

## 2020-08-13 RX ORDER — ONDANSETRON 2 MG/ML
4 INJECTION INTRAMUSCULAR; INTRAVENOUS
Status: DISCONTINUED | OUTPATIENT
Start: 2020-08-13 | End: 2020-08-13 | Stop reason: HOSPADM

## 2020-08-13 RX ORDER — ATORVASTATIN CALCIUM 40 MG/1
40 TABLET, FILM COATED ORAL EVERY EVENING
Status: DISCONTINUED | OUTPATIENT
Start: 2020-08-13 | End: 2020-08-15 | Stop reason: HOSPADM

## 2020-08-13 RX ORDER — DEXTROSE MONOHYDRATE 25 G/50ML
50 INJECTION, SOLUTION INTRAVENOUS
Status: DISCONTINUED | OUTPATIENT
Start: 2020-08-13 | End: 2020-08-15 | Stop reason: HOSPADM

## 2020-08-13 RX ORDER — LIDOCAINE HYDROCHLORIDE 40 MG/ML
SOLUTION TOPICAL PRN
Status: DISCONTINUED | OUTPATIENT
Start: 2020-08-13 | End: 2020-08-13 | Stop reason: SURG

## 2020-08-13 RX ORDER — ENALAPRIL MALEATE 5 MG/1
2.5 TABLET ORAL
Status: DISCONTINUED | OUTPATIENT
Start: 2020-08-14 | End: 2020-08-15 | Stop reason: HOSPADM

## 2020-08-13 RX ORDER — LABETALOL HYDROCHLORIDE 5 MG/ML
5 INJECTION, SOLUTION INTRAVENOUS
Status: DISCONTINUED | OUTPATIENT
Start: 2020-08-13 | End: 2020-08-13 | Stop reason: HOSPADM

## 2020-08-13 RX ORDER — LIDOCAINE HYDROCHLORIDE 20 MG/ML
INJECTION, SOLUTION EPIDURAL; INFILTRATION; INTRACAUDAL; PERINEURAL PRN
Status: DISCONTINUED | OUTPATIENT
Start: 2020-08-13 | End: 2020-08-13 | Stop reason: SURG

## 2020-08-13 RX ORDER — DIPHENHYDRAMINE HYDROCHLORIDE 50 MG/ML
12.5 INJECTION INTRAMUSCULAR; INTRAVENOUS
Status: DISCONTINUED | OUTPATIENT
Start: 2020-08-13 | End: 2020-08-13 | Stop reason: HOSPADM

## 2020-08-13 RX ORDER — KETOROLAC TROMETHAMINE 30 MG/ML
INJECTION, SOLUTION INTRAMUSCULAR; INTRAVENOUS PRN
Status: DISCONTINUED | OUTPATIENT
Start: 2020-08-13 | End: 2020-08-13 | Stop reason: SURG

## 2020-08-13 RX ORDER — HYDRALAZINE HYDROCHLORIDE 20 MG/ML
5 INJECTION INTRAMUSCULAR; INTRAVENOUS
Status: DISCONTINUED | OUTPATIENT
Start: 2020-08-13 | End: 2020-08-13 | Stop reason: HOSPADM

## 2020-08-13 RX ORDER — HYDROMORPHONE HYDROCHLORIDE 1 MG/ML
0.2 INJECTION, SOLUTION INTRAMUSCULAR; INTRAVENOUS; SUBCUTANEOUS
Status: DISCONTINUED | OUTPATIENT
Start: 2020-08-13 | End: 2020-08-13 | Stop reason: HOSPADM

## 2020-08-13 RX ORDER — DOCUSATE SODIUM 100 MG/1
100 CAPSULE, LIQUID FILLED ORAL 2 TIMES DAILY
Status: DISCONTINUED | OUTPATIENT
Start: 2020-08-13 | End: 2020-08-15 | Stop reason: HOSPADM

## 2020-08-13 RX ORDER — LORAZEPAM 2 MG/ML
0.5 INJECTION INTRAMUSCULAR
Status: DISCONTINUED | OUTPATIENT
Start: 2020-08-13 | End: 2020-08-13 | Stop reason: HOSPADM

## 2020-08-13 RX ORDER — ONDANSETRON 2 MG/ML
INJECTION INTRAMUSCULAR; INTRAVENOUS PRN
Status: DISCONTINUED | OUTPATIENT
Start: 2020-08-13 | End: 2020-08-13 | Stop reason: SURG

## 2020-08-13 RX ORDER — HYDROMORPHONE HYDROCHLORIDE 1 MG/ML
0.4 INJECTION, SOLUTION INTRAMUSCULAR; INTRAVENOUS; SUBCUTANEOUS
Status: DISCONTINUED | OUTPATIENT
Start: 2020-08-13 | End: 2020-08-13 | Stop reason: HOSPADM

## 2020-08-13 RX ORDER — PHENYLEPHRINE HCL IN 0.9% NACL 0.5 MG/5ML
SYRINGE (ML) INTRAVENOUS PRN
Status: DISCONTINUED | OUTPATIENT
Start: 2020-08-13 | End: 2020-08-13 | Stop reason: SURG

## 2020-08-13 RX ORDER — SODIUM CHLORIDE, SODIUM LACTATE, POTASSIUM CHLORIDE, CALCIUM CHLORIDE 600; 310; 30; 20 MG/100ML; MG/100ML; MG/100ML; MG/100ML
INJECTION, SOLUTION INTRAVENOUS CONTINUOUS
Status: ACTIVE | OUTPATIENT
Start: 2020-08-13 | End: 2020-08-14

## 2020-08-13 RX ORDER — ENEMA 19; 7 G/133ML; G/133ML
1 ENEMA RECTAL
Status: DISCONTINUED | OUTPATIENT
Start: 2020-08-13 | End: 2020-08-15 | Stop reason: HOSPADM

## 2020-08-13 RX ADMIN — ONDANSETRON 4 MG: 2 INJECTION INTRAMUSCULAR; INTRAVENOUS at 18:10

## 2020-08-13 RX ADMIN — LIDOCAINE HYDROCHLORIDE 3 ML: 40 SOLUTION TOPICAL at 18:07

## 2020-08-13 RX ADMIN — FENTANYL CITRATE 50 MCG: 50 INJECTION, SOLUTION INTRAMUSCULAR; INTRAVENOUS at 19:11

## 2020-08-13 RX ADMIN — SUGAMMADEX 200 MG: 100 INJECTION, SOLUTION INTRAVENOUS at 19:11

## 2020-08-13 RX ADMIN — PIPERACILLIN AND TAZOBACTAM 4.5 G: 4; .5 INJECTION, POWDER, LYOPHILIZED, FOR SOLUTION INTRAVENOUS; PARENTERAL at 21:15

## 2020-08-13 RX ADMIN — PIPERACILLIN AND TAZOBACTAM 4.5 G: 4; .5 INJECTION, POWDER, LYOPHILIZED, FOR SOLUTION INTRAVENOUS; PARENTERAL at 13:10

## 2020-08-13 RX ADMIN — CEFTRIAXONE SODIUM 2 G: 2 INJECTION, POWDER, FOR SOLUTION INTRAMUSCULAR; INTRAVENOUS at 10:42

## 2020-08-13 RX ADMIN — FENTANYL CITRATE 50 MCG: 50 INJECTION, SOLUTION INTRAMUSCULAR; INTRAVENOUS at 19:15

## 2020-08-13 RX ADMIN — ROCURONIUM BROMIDE 50 MG: 10 INJECTION, SOLUTION INTRAVENOUS at 18:06

## 2020-08-13 RX ADMIN — KETOROLAC TROMETHAMINE 15 MG: 30 INJECTION, SOLUTION INTRAMUSCULAR at 19:11

## 2020-08-13 RX ADMIN — PIPERACILLIN AND TAZOBACTAM 4.5 G: 4; .5 INJECTION, POWDER, LYOPHILIZED, FOR SOLUTION INTRAVENOUS; PARENTERAL at 15:28

## 2020-08-13 RX ADMIN — Medication 100 MG: at 18:06

## 2020-08-13 RX ADMIN — IOHEXOL 100 ML: 350 INJECTION, SOLUTION INTRAVENOUS at 10:08

## 2020-08-13 RX ADMIN — SODIUM CHLORIDE: 900 INJECTION INTRAVENOUS at 13:09

## 2020-08-13 RX ADMIN — METRONIDAZOLE 500 MG: 500 INJECTION, SOLUTION INTRAVENOUS at 11:39

## 2020-08-13 RX ADMIN — FENTANYL CITRATE 100 MCG: 50 INJECTION, SOLUTION INTRAMUSCULAR; INTRAVENOUS at 18:04

## 2020-08-13 RX ADMIN — PROPOFOL 100 MG: 10 INJECTION, EMULSION INTRAVENOUS at 18:06

## 2020-08-13 RX ADMIN — Medication 100 MCG: at 18:11

## 2020-08-13 RX ADMIN — LIDOCAINE HYDROCHLORIDE 80 MG: 20 INJECTION, SOLUTION EPIDURAL; INFILTRATION; INTRACAUDAL; PERINEURAL at 18:05

## 2020-08-13 RX ADMIN — SODIUM CHLORIDE, POTASSIUM CHLORIDE, SODIUM LACTATE AND CALCIUM CHLORIDE: 600; 310; 30; 20 INJECTION, SOLUTION INTRAVENOUS at 17:58

## 2020-08-13 RX ADMIN — FENTANYL CITRATE 50 MCG: 50 INJECTION, SOLUTION INTRAMUSCULAR; INTRAVENOUS at 19:18

## 2020-08-13 RX ADMIN — ATORVASTATIN CALCIUM 40 MG: 40 TABLET, FILM COATED ORAL at 21:15

## 2020-08-13 RX ADMIN — FENTANYL CITRATE 50 MCG: 50 INJECTION, SOLUTION INTRAMUSCULAR; INTRAVENOUS at 18:44

## 2020-08-13 ASSESSMENT — COGNITIVE AND FUNCTIONAL STATUS - GENERAL
MOBILITY SCORE: 24
DAILY ACTIVITIY SCORE: 24
SUGGESTED CMS G CODE MODIFIER DAILY ACTIVITY: CH
SUGGESTED CMS G CODE MODIFIER MOBILITY: CH

## 2020-08-13 ASSESSMENT — LIFESTYLE VARIABLES
EVER HAD A DRINK FIRST THING IN THE MORNING TO STEADY YOUR NERVES TO GET RID OF A HANGOVER: NO
HAVE YOU EVER FELT YOU SHOULD CUT DOWN ON YOUR DRINKING: NO
TOTAL SCORE: 0
TOTAL SCORE: 0
ON A TYPICAL DAY WHEN YOU DRINK ALCOHOL HOW MANY DRINKS DO YOU HAVE: 0
AVERAGE NUMBER OF DAYS PER WEEK YOU HAVE A DRINK CONTAINING ALCOHOL: 1
EVER_SMOKED: NEVER
ALCOHOL_USE: NO
EVER FELT BAD OR GUILTY ABOUT YOUR DRINKING: NO
TOTAL SCORE: 0
HOW MANY TIMES IN THE PAST YEAR HAVE YOU HAD 5 OR MORE DRINKS IN A DAY: 0
HAVE PEOPLE ANNOYED YOU BY CRITICIZING YOUR DRINKING: NO
CONSUMPTION TOTAL: NEGATIVE

## 2020-08-13 ASSESSMENT — PATIENT HEALTH QUESTIONNAIRE - PHQ9
SUM OF ALL RESPONSES TO PHQ9 QUESTIONS 1 AND 2: 0
2. FEELING DOWN, DEPRESSED, IRRITABLE, OR HOPELESS: NOT AT ALL
1. LITTLE INTEREST OR PLEASURE IN DOING THINGS: NOT AT ALL

## 2020-08-13 ASSESSMENT — PAIN SCALES - GENERAL: PAIN_LEVEL: 3

## 2020-08-13 NOTE — ED PROVIDER NOTES
ED Provider Note    CHIEF COMPLAINT  Chief Complaint   Patient presents with   • Abdominal Pain     started Monday   • N/V     reports one episode of vomiting on Monday       HPI  Berlin Hurtado is a 64 y.o. male who presents to the emergency department complaining of abdominal pain.  Patient's had abdominal pain since Sunday. The  pain was initially diffuse and was described as feeling distended.  He drank some water and had some vomiting.  Vomited once on Monday.  Then had a couple episodes of diarrhea.  The pain is now localized to right lower quadrant.  Is worsened by movement and palpation.  Has been having intermittent fevers at night.  No dysuria hematuria or history of pregnancy.  No runny nose, lack of smell, or cough.  No cold exposures.  No previous abdominal operations.  Denies any other aggravating or alleviating factors or associated complaints.    REVIEW OF SYSTEMS  See HPI for further details. All other systems are negative.    PAST MEDICAL HISTORY  Past Medical History:   Diagnosis Date   • Corneal transplant status    • Diabetes (HCC)    • Hyperlipidemia    • Hypertension        FAMILY HISTORY  Family History   Problem Relation Age of Onset   • Diabetes Father    • Alcohol/Drug Father    • Diabetes Sister    • No Known Problems Sister        SOCIAL HISTORY  Social History     Socioeconomic History   • Marital status:      Spouse name: Not on file   • Number of children: Not on file   • Years of education: Not on file   • Highest education level: Not on file   Occupational History   • Not on file   Social Needs   • Financial resource strain: Not on file   • Food insecurity     Worry: Not on file     Inability: Not on file   • Transportation needs     Medical: Not on file     Non-medical: Not on file   Tobacco Use   • Smoking status: Never Smoker   • Smokeless tobacco: Never Used   Substance and Sexual Activity   • Alcohol use: Yes     Alcohol/week: 4.2 oz     Types: 7 Cans of beer per  "week   • Drug use: No   • Sexual activity: Yes   Lifestyle   • Physical activity     Days per week: Not on file     Minutes per session: Not on file   • Stress: Not on file   Relationships   • Social connections     Talks on phone: Not on file     Gets together: Not on file     Attends Anabaptism service: Not on file     Active member of club or organization: Not on file     Attends meetings of clubs or organizations: Not on file     Relationship status: Not on file   • Intimate partner violence     Fear of current or ex partner: Not on file     Emotionally abused: Not on file     Physically abused: Not on file     Forced sexual activity: Not on file   Other Topics Concern   • Not on file   Social History Narrative    Works as a        SURGICAL HISTORY  Past Surgical History:   Procedure Laterality Date   • TOE ARTHROPLASTY         CURRENT MEDICATIONS  Home Medications     Reviewed by Faby Ernandez (Pharmacy Tech) on 08/13/20 at 0838  Med List Status: Complete   Medication Last Dose Status   aspirin 81 MG EC tablet 8/12/2020 Active   atorvastatin (LIPITOR) 40 MG Tab 8/12/2020 Active   enalapril (VASOTEC) 2.5 MG Tab 8/12/2020 Active   glimepiride (AMARYL) 4 MG Tab 8/12/2020 Active   metformin (GLUCOPHAGE) 1000 MG tablet 8/12/2020 Active   multivitamin (THERAGRAN) Tab 8/12/2020 Active                ALLERGIES  No Known Allergies    PHYSICAL EXAM  VITAL SIGNS: /85   Pulse 89   Temp 36.7 °C (98 °F) (Temporal)   Resp 17   Ht 1.6 m (5' 3\")   Wt 65.7 kg (144 lb 13.5 oz)   SpO2 98%   BMI 25.66 kg/m²    Constitutional: Well developed, Well nourished, No acute distress, Non-toxic appearance.   HENT: Normocephalic, Atraumatic, Bilateral external ears normal, Oropharynx moist, No oral exudates, Nose normal.   Eyes: PERRL, EOMI, Conjunctiva normal, No discharge.   Neck: Normal range of motion,  Cardiovascular: Normal heart rate, Normal rhythm, No murmurs, No rubs, No gallops.   Thorax & Lungs: " Normal breath sounds, No respiratory distress,  Abdomen: Bowel sounds normal, Soft, tender in the right lower quadrant no peritonitis.  Skin: Warm, Dry, No erythema, No rash.   Back: No tenderness, No CVA tenderness  Musculoskeletal: Good range of motion in all major joints.  Neurologic: Alert,  No focal deficits noted.   Psychiatric: Affect normal     CT-ABDOMEN-PELVIS WITH   Final Result      1.  Ruptured appendicitis      2.  3.9 x 1.8 x 1.3 cm right lower quadrant interloop abscess      3.  Marked thickening of multiple distal ileal bowel loops extending to the terminal ileum. This is most likely to be reactive ileitis rather than Crohn's disease      Findings were discussed with KARINA LIVE on 8/13/2020 10:31 AM.      IR-CONSULT AND TREAT    (Results Pending)         Results for orders placed or performed during the hospital encounter of 08/13/20   CBC WITH DIFFERENTIAL   Result Value Ref Range    WBC 9.4 4.8 - 10.8 K/uL    RBC 5.25 4.70 - 6.10 M/uL    Hemoglobin 16.0 14.0 - 18.0 g/dL    Hematocrit 45.8 42.0 - 52.0 %    MCV 87.2 81.4 - 97.8 fL    MCH 30.5 27.0 - 33.0 pg    MCHC 34.9 33.7 - 35.3 g/dL    RDW 40.8 35.9 - 50.0 fL    Platelet Count 134 (L) 164 - 446 K/uL    MPV 10.0 9.0 - 12.9 fL    Neutrophils-Polys 80.70 (H) 44.00 - 72.00 %    Lymphocytes 8.80 (L) 22.00 - 41.00 %    Monocytes 8.60 0.00 - 13.40 %    Eosinophils 0.60 0.00 - 6.90 %    Basophils 0.60 0.00 - 1.80 %    Immature Granulocytes 0.70 0.00 - 0.90 %    Nucleated RBC 0.00 /100 WBC    Neutrophils (Absolute) 7.61 (H) 1.82 - 7.42 K/uL    Lymphs (Absolute) 0.83 (L) 1.00 - 4.80 K/uL    Monos (Absolute) 0.81 0.00 - 0.85 K/uL    Eos (Absolute) 0.06 0.00 - 0.51 K/uL    Baso (Absolute) 0.06 0.00 - 0.12 K/uL    Immature Granulocytes (abs) 0.07 0.00 - 0.11 K/uL    NRBC (Absolute) 0.00 K/uL   COMP METABOLIC PANEL   Result Value Ref Range    Sodium 133 (L) 135 - 145 mmol/L    Potassium 3.8 3.6 - 5.5 mmol/L    Chloride 99 96 - 112 mmol/L    Co2 22 20 -  33 mmol/L    Anion Gap 12.0 7.0 - 16.0    Glucose 139 (H) 65 - 99 mg/dL    Bun 13 8 - 22 mg/dL    Creatinine 0.61 0.50 - 1.40 mg/dL    Calcium 8.7 8.4 - 10.2 mg/dL    AST(SGOT) 24 12 - 45 U/L    ALT(SGPT) 25 2 - 50 U/L    Alkaline Phosphatase 90 30 - 99 U/L    Total Bilirubin 1.1 0.1 - 1.5 mg/dL    Albumin 3.5 3.2 - 4.9 g/dL    Total Protein 7.0 6.0 - 8.2 g/dL    Globulin 3.5 1.9 - 3.5 g/dL    A-G Ratio 1.0 g/dL   LIPASE   Result Value Ref Range    Lipase 13 7 - 58 U/L   COVID/SARS CoV-2 PCR    Specimen: Nasopharyngeal; Respirate   Result Value Ref Range    COVID Order Status Received    ESTIMATED GFR   Result Value Ref Range    GFR If African American >60 >60 mL/min/1.73 m 2    GFR If Non African American >60 >60 mL/min/1.73 m 2   SARS-CoV-2, PCR (In-House)   Result Value Ref Range    SARS-CoV-2 Source NP Swab     SARS-CoV-2 by PCR NotDetected       COURSE & MEDICAL DECISION MAKING  Pertinent Labs & Imaging studies reviewed. (See chart for details)    Patient presents with right lower quadrant abdominal pain.  Differential diagnosis includes but is not limited to appendicitis, colitis, less likely renal colic, or pyelonephritis.      The patient is worked up with labs and an abdominal CT scan.  CBC, and metabolic panel lipase are unremarkable.  COVID test is negative.  Indication for cover test is preoperative testing.    CT is reviewed by me and shows inflammation right lower quadrant and what appears to be an enlarged inflamed appendix with evidence of rupture and abscess.    I discussed the case with Dr. Hicks the surgeon.  He requests I discussed that with IR for possible percutaneous drainage.  Today [facility] is on close to [facility] were unable to transfer to the ED for this.  I spoke with Dr. Edouard on-call for IR and he is able to come here if needed.  He requests I order the procedure.    He felt the abscess was small and may not be amenable to percutaneous drainage.    I spoke with   Fabiola about this again.  Dr. Hicks has admitted the patient.  He started on IV antibiotics.  Will be hospitalized for further work-up and treatment care is transferred to Dr. Hicks the surgeon.          FINAL IMPRESSION  1. Acute appendicitis with perforation, localized peritonitis, and abscess, without gangrene         2.   3.         Electronically signed by: Leonides Mccoy M.D., 8/13/2020 8:19 AM

## 2020-08-13 NOTE — ED NOTES
Med Rec completed per patient and daughter with medication pictures on phone    Allergies reviewed  No ORAL antibiotics in last 14 days

## 2020-08-13 NOTE — ED NOTES
1125:  Pt and family member updated on POC including pending admission.  Pt and family member aware of NPO status at this time.

## 2020-08-13 NOTE — ED NOTES
Pt's daughter states that he gets a fever at night and states that the highest has been 102.7. Pt states that he is a diabetic and denies n/v at this time

## 2020-08-13 NOTE — ED TRIAGE NOTES
"Chief Complaint   Patient presents with   • Abdominal Pain     started Monday   • N/V     reports one episode of vomiting on Monday     /84   Pulse 92   Temp 36.7 °C (98 °F) (Temporal)   Resp 17   Ht 1.6 m (5' 3\")   Wt 65.7 kg (144 lb 13.5 oz)   SpO2 92%   BMI 25.66 kg/m²     Covid Screen Negative    Pt reports last BM this am    "

## 2020-08-13 NOTE — ED NOTES
Covid culture collected and sent to lab.  Pt and family member updated on POC including pending tests and chart review by ERP, denied any needs at this time.

## 2020-08-13 NOTE — PROGRESS NOTES
Patient arrived to floor, able to ambulate from hallway to hospital bed. Patient c/o 3/10 pain, denies nausea/vomitting. Plan of care discussed, including surgery planned for 1700. Patient verbalized understanding. Fall precautions in place. Call light within reach. Hourly rounding in place.

## 2020-08-14 LAB
ALBUMIN SERPL BCP-MCNC: 2.7 G/DL (ref 3.2–4.9)
ALBUMIN/GLOB SERPL: 0.9 G/DL
ALP SERPL-CCNC: 91 U/L (ref 30–99)
ALT SERPL-CCNC: 23 U/L (ref 2–50)
ANION GAP SERPL CALC-SCNC: 10 MMOL/L (ref 7–16)
AST SERPL-CCNC: 23 U/L (ref 12–45)
BASOPHILS # BLD AUTO: 0.6 % (ref 0–1.8)
BASOPHILS # BLD: 0.04 K/UL (ref 0–0.12)
BILIRUB SERPL-MCNC: 0.7 MG/DL (ref 0.1–1.5)
BUN SERPL-MCNC: 13 MG/DL (ref 8–22)
CALCIUM SERPL-MCNC: 8 MG/DL (ref 8.4–10.2)
CHLORIDE SERPL-SCNC: 100 MMOL/L (ref 96–112)
CO2 SERPL-SCNC: 21 MMOL/L (ref 20–33)
CREAT SERPL-MCNC: 0.69 MG/DL (ref 0.5–1.4)
EOSINOPHIL # BLD AUTO: 0.03 K/UL (ref 0–0.51)
EOSINOPHIL NFR BLD: 0.4 % (ref 0–6.9)
ERYTHROCYTE [DISTWIDTH] IN BLOOD BY AUTOMATED COUNT: 41.7 FL (ref 35.9–50)
GLOBULIN SER CALC-MCNC: 3.1 G/DL (ref 1.9–3.5)
GLUCOSE BLD-MCNC: 148 MG/DL (ref 65–99)
GLUCOSE BLD-MCNC: 159 MG/DL (ref 65–99)
GLUCOSE BLD-MCNC: 168 MG/DL (ref 65–99)
GLUCOSE BLD-MCNC: 170 MG/DL (ref 65–99)
GLUCOSE BLD-MCNC: 266 MG/DL (ref 65–99)
GLUCOSE SERPL-MCNC: 164 MG/DL (ref 65–99)
GRAM STN SPEC: NORMAL
HCT VFR BLD AUTO: 42.1 % (ref 42–52)
HGB BLD-MCNC: 14.3 G/DL (ref 14–18)
IMM GRANULOCYTES # BLD AUTO: 0.07 K/UL (ref 0–0.11)
IMM GRANULOCYTES NFR BLD AUTO: 1 % (ref 0–0.9)
LYMPHOCYTES # BLD AUTO: 0.65 K/UL (ref 1–4.8)
LYMPHOCYTES NFR BLD: 9.1 % (ref 22–41)
MCH RBC QN AUTO: 30 PG (ref 27–33)
MCHC RBC AUTO-ENTMCNC: 34 G/DL (ref 33.7–35.3)
MCV RBC AUTO: 88.4 FL (ref 81.4–97.8)
MONOCYTES # BLD AUTO: 0.93 K/UL (ref 0–0.85)
MONOCYTES NFR BLD AUTO: 13 % (ref 0–13.4)
NEUTROPHILS # BLD AUTO: 5.43 K/UL (ref 1.82–7.42)
NEUTROPHILS NFR BLD: 75.9 % (ref 44–72)
NRBC # BLD AUTO: 0 K/UL
NRBC BLD-RTO: 0 /100 WBC
PLATELET # BLD AUTO: 146 K/UL (ref 164–446)
PMV BLD AUTO: 9.9 FL (ref 9–12.9)
POTASSIUM SERPL-SCNC: 3.7 MMOL/L (ref 3.6–5.5)
PROT SERPL-MCNC: 5.8 G/DL (ref 6–8.2)
RBC # BLD AUTO: 4.76 M/UL (ref 4.7–6.1)
SIGNIFICANT IND 70042: NORMAL
SITE SITE: NORMAL
SODIUM SERPL-SCNC: 131 MMOL/L (ref 135–145)
SOURCE SOURCE: NORMAL
WBC # BLD AUTO: 7.2 K/UL (ref 4.8–10.8)

## 2020-08-14 PROCEDURE — 700111 HCHG RX REV CODE 636 W/ 250 OVERRIDE (IP): Performed by: SURGERY

## 2020-08-14 PROCEDURE — 700102 HCHG RX REV CODE 250 W/ 637 OVERRIDE(OP): Performed by: SURGERY

## 2020-08-14 PROCEDURE — A9270 NON-COVERED ITEM OR SERVICE: HCPCS | Performed by: SURGERY

## 2020-08-14 PROCEDURE — 85025 COMPLETE CBC W/AUTO DIFF WBC: CPT

## 2020-08-14 PROCEDURE — 82962 GLUCOSE BLOOD TEST: CPT

## 2020-08-14 PROCEDURE — 700105 HCHG RX REV CODE 258: Performed by: SURGERY

## 2020-08-14 PROCEDURE — 700112 HCHG RX REV CODE 229: Performed by: SURGERY

## 2020-08-14 PROCEDURE — 770006 HCHG ROOM/CARE - MED/SURG/GYN SEMI*

## 2020-08-14 PROCEDURE — 80053 COMPREHEN METABOLIC PANEL: CPT

## 2020-08-14 PROCEDURE — 36415 COLL VENOUS BLD VENIPUNCTURE: CPT

## 2020-08-14 RX ORDER — OXYCODONE HYDROCHLORIDE 5 MG/1
5-10 TABLET ORAL EVERY 4 HOURS PRN
Status: DISCONTINUED | OUTPATIENT
Start: 2020-08-14 | End: 2020-08-15 | Stop reason: HOSPADM

## 2020-08-14 RX ADMIN — PIPERACILLIN AND TAZOBACTAM 4.5 G: 4; .5 INJECTION, POWDER, LYOPHILIZED, FOR SOLUTION INTRAVENOUS; PARENTERAL at 17:04

## 2020-08-14 RX ADMIN — INSULIN HUMAN 1 UNITS: 100 INJECTION, SOLUTION PARENTERAL at 06:01

## 2020-08-14 RX ADMIN — ENALAPRIL MALEATE 2.5 MG: 5 TABLET ORAL at 05:58

## 2020-08-14 RX ADMIN — PIPERACILLIN AND TAZOBACTAM 4.5 G: 4; .5 INJECTION, POWDER, LYOPHILIZED, FOR SOLUTION INTRAVENOUS; PARENTERAL at 06:01

## 2020-08-14 RX ADMIN — INSULIN HUMAN 3 UNITS: 100 INJECTION, SOLUTION PARENTERAL at 00:55

## 2020-08-14 RX ADMIN — INSULIN HUMAN 1 UNITS: 100 INJECTION, SOLUTION PARENTERAL at 16:57

## 2020-08-14 RX ADMIN — SODIUM CHLORIDE: 900 INJECTION INTRAVENOUS at 22:58

## 2020-08-14 RX ADMIN — PIPERACILLIN AND TAZOBACTAM 4.5 G: 4; .5 INJECTION, POWDER, LYOPHILIZED, FOR SOLUTION INTRAVENOUS; PARENTERAL at 21:00

## 2020-08-14 RX ADMIN — ATORVASTATIN CALCIUM 40 MG: 40 TABLET, FILM COATED ORAL at 17:00

## 2020-08-14 RX ADMIN — INSULIN HUMAN 1 UNITS: 100 INJECTION, SOLUTION PARENTERAL at 22:52

## 2020-08-14 RX ADMIN — DOCUSATE SODIUM 100 MG: 100 CAPSULE, LIQUID FILLED ORAL at 05:58

## 2020-08-14 ASSESSMENT — COGNITIVE AND FUNCTIONAL STATUS - GENERAL
SUGGESTED CMS G CODE MODIFIER MOBILITY: CH
DAILY ACTIVITIY SCORE: 24
SUGGESTED CMS G CODE MODIFIER DAILY ACTIVITY: CH
MOBILITY SCORE: 24

## 2020-08-14 NOTE — OP REPORT
DATE OF SERVICE:  08/13/2020    PREOPERATIVE DIAGNOSIS:  Ruptured appendicitis with interloop small bowel and   pericecal abscess.    POSTOPERATIVE DIAGNOSIS:  Ruptured appendicitis with interloop small bowel and   pericecal abscess.    OPERATION:  Laparoscopic appendectomy with drainage of retrocecal and   interloop small bowel abscess.    SURGEON:  Will Rowan MD    ANESTHESIOLOGIST:  Alina Torres MD    ANESTHESIA:  General anesthesia.    OPERATIVE NOTE:  The patient is a diabetic, 64 years of age, who presents in a   delayed fashion with ruptured appendicitis with a periappendiceal abscess.    This was felt unlikely to be drained by percutaneous CT and we felt that the   patient should undergo surgical intervention in light of diabetes.  The risks   and possible complications of operation were carefully explained to the   patient who provided consent to proceed.  He had been stabilized with IV   fluids and antibiotics.  He was taken to the operating room with his consent,   placed under anesthesia by Dr. Torres.  His abdomen was then prepped with   ChloraPrep solution, sterile drapes were applied and a timeout was affected.    A solution of 0.5% Marcaine with epinephrine was liberally infiltrated in all   incisions.  A periumbilical incision was made.  The fascia was elevated.    Veress needle was inserted.  Saline drop test was permissive to proceed with   step pneumo insufflation.  Once fully pneumo insufflated, a 5 mm trocar was   placed.  Patient was found to have some air in the mesentery of the small   bowel, but no evidence of injury to the small bowel itself from entry.    Patient had dense inflammatory response of the small bowel against the right   pelvic sidewall in the region of the appendix but there was no diffuse   peritonitis.  A 5 mm trocar was placed in the mid hypogastrium and a 12 mm   trocar was placed in the low hypogastrium under direct vision.    Instrumentation was introduced.  The  small bowel was gradually mobilized off   the pelvic sidewall.  We encountered an abscess.  This was aspirated.  The   abscess went down interloops between the ileum.  This was fully mobilized.    This was partially debrided with exudate being removed through the ports.  We   then followed up the small bowel into the retrocecal area where there was a   similar abscess.  Here, there was intense inflammatory response of the   appendix and epiploic appendages.  These were gradually .  The   appendix was identified.  Its base was mobilized.  This was transected using   an Endo-JUAN with 3.5 mm staples.  The appendiceal mesentery was then similarly   elevated and excised.  This was done using an Endo-JUAN with 2.5 mm staples.    The patient had an adjacent epiploic appendage which was similarly resected   with an Endo JUAN and 2.5 mm staples.  These 2 specimens were then delivered   via an Endosac.  Hemostasis was assured.  There was no attempted large volume   irrigation.  We decompressed the free fluid present within the abdominal   cavity.  A 19-Cymraes round [place] Bard channel drain was then inserted through   the 12 mm port site.  This was placed in the pelvis up into the retrocecal   area and then into the interloop abscess.  This was sewn at the skin using a   nylon suture.  The patient had pneumoperitoneum collapse then and the trocars   were removed.  The remaining skin was closed using automatic stapling device   and Band-Aid dressings were applied.  Patient was awakened, extubated, taken   to recovery room in stable and satisfactory condition.  Estimated blood loss   was around 40 mL.  Sponge, instrument and needle counts reported as correct.    There were no intraoperative complications.  Pathology is pending.  This is   critical.  The appendix was also cultured.  The patient will be admitted for   postoperative care and IV antibiotic therapy pending positive clinical   response.        ____________________________________     MD VIDA CHAMBERS / [facility]    DD:  08/13/2020 19:32:16  DT:  08/14/2020 00:13:04    D#:  [id number]  Job#:  [id number]    cc: Alina Torres MD

## 2020-08-14 NOTE — ANESTHESIA PREPROCEDURE EVALUATION
63 yo M with T2DM, HTN here for l/p appy versus open    Relevant Problems   CARDIAC   (+) Essential hypertension      ENDO   (+) Type 2 diabetes mellitus without complication (HCC)       Physical Exam    Airway   Mallampati: II  TM distance: >3 FB  Neck ROM: full       Cardiovascular - normal exam  Rhythm: regular  Rate: normal  (-) murmur     Dental       Very poor dentition   Pulmonary - normal exam  Breath sounds clear to auscultation     Abdominal   (-) obese     Neurological - normal exam                 Anesthesia Plan    ASA 2- EMERGENT   ASA physical status emergent criteria: acute peritonitis    Plan - general       Airway plan will be ETT        Induction: intravenous    Postoperative Plan: Postoperative administration of opioids is intended.    Pertinent diagnostic labs and testing reviewed    Informed Consent:    Anesthetic plan and risks discussed with patient.    Use of blood products discussed with: patient whom consented to blood products.

## 2020-08-14 NOTE — ANESTHESIA POSTPROCEDURE EVALUATION
Patient: Berlin Hurtado    Procedure Summary     Date: 08/13/20 Room / Location: [place] [address] / SURGERY [facility]    Anesthesia Start: 1758 Anesthesia Stop: 1928    Procedure: APPENDECTOMY, LAPAROSCOPIC PSB. OPEN (N/A Abdomen) Diagnosis: (ruptured appendicitis)    Surgeon: Will Rowan M.D. Responsible Provider: Alina Torres M.D.    Anesthesia Type: general ASA Status: 2 - Emergent          Final Anesthesia Type: general  Last vitals  BP   Blood Pressure : 118/62   Temp   36.7 °C    Pulse   Pulse: 89   Resp   18    SpO2   98 %      Anesthesia Post Evaluation    Patient location during evaluation: PACU  Patient participation: complete - patient participated  Level of consciousness: awake and alert  Pain score: 3    Airway patency: patent  Anesthetic complications: no  Cardiovascular status: hemodynamically stable  Respiratory status: acceptable and face mask  Hydration status: euvolemic    PONV: none           Nurse Pain Score: 0 (NPRS)

## 2020-08-14 NOTE — OR NURSING
1926: To PACU from OR via bed, Patient is awake and alert, he is denying pain and or nausea at this time. His Abdomen has 3 lap stabs and the lower one has a DAVID drain which has a minor amount of serosanguinous fluid. Patient has received 750 ml of LR upon arrival to PACU Per Anesthesia.    1940: Patient is pain free and waking up, he is A&Ox4.    1950: Patient provided with supplemental oxygen via nasal cannula as room air SpO2 level went to 90% when Patient sleeps.    2005: Report called to CHEKO Baig, Patient meets criteria to return to [facility].

## 2020-08-14 NOTE — ANESTHESIA PROCEDURE NOTES
Peripheral IV    Date/Time: 8/13/2020 6:10 PM  Performed by: Alina Torres M.D.  Authorized by: Alina Torres M.D.     Size:  18 G  Laterality:  Right  Local Anesthetic:  None  Site Prep:  Alcohol  Technique:  Direct puncture  Attempts:  1

## 2020-08-14 NOTE — ANESTHESIA PROCEDURE NOTES
Airway    Date/Time: 8/13/2020 6:07 PM  Performed by: Alina Torres M.D.  Authorized by: Alina Torres M.D.     Location:  OR  Urgency:  Elective  Difficult Airway: No    Indications for Airway Management:  Anesthesia      Spontaneous Ventilation: absent    Sedation Level:  Deep  Preoxygenated: Yes    Patient Position:  Sniffing  MILS Maintained Throughout: Yes    Mask Difficulty Assessment:  0 - not attempted  Final Airway Type:  Endotracheal airway  Final Endotracheal Airway:  ETT  Cuffed: Yes    Technique Used for Successful ETT Placement:  Direct laryngoscopy    Insertion Site:  Oral  Blade Type:  Heri  Laryngoscope Blade/Videolaryngoscope Blade Size:  3  ETT Size (mm):  7.5  Measured from:  Teeth  ETT to Teeth (cm):  22  Placement Verified by: auscultation and capnometry    Cormack-Lehane Classification:  Grade III - view of epiglottis only  Number of Attempts at Approach:  1  Ventilation Between Attempts:  None  Number of Other Approaches Attempted:  0

## 2020-08-14 NOTE — PROGRESS NOTES
Received report from CHEKO Baig. Assumed care at 0700. Patient is A/Ox4, c/o mild pain with movement 3/10. Patient declines medication intervention. DAVID drain present to bulb suction, three lap sites with gauze/tegaderm, moderate old and new drainage. DAVID site dressing, reinforced. Plan of care discussed including, IV antibiotics and plan for staying one more night. Fall precautions in place. Call light within reach. Hourly rounding in place.

## 2020-08-14 NOTE — PROGRESS NOTES
2RN's Skin Assessment- Pt skin from head to toe was within limits excepts 3lap sites on the abdomen with DAVID drain. Cover with gauze and transparent tape.

## 2020-08-14 NOTE — ANESTHESIA TIME REPORT
Anesthesia Start and Stop Event Times     Date Time Event    8/13/2020 1730 Ready for Procedure     1758 Anesthesia Start     1928 Anesthesia Stop        Responsible Staff  08/13/20    Name Role Begin End    Alina Torres M.D. Anesth 1745 1928        Preop Diagnosis (Free Text):  Pre-op Diagnosis     ruptured appendicitis        Preop Diagnosis (Codes):    Post op Diagnosis  Ruptured appendix      Premium Reason  K. Alert    Comments:

## 2020-08-14 NOTE — CARE PLAN
Problem: Communication  Goal: The ability to communicate needs accurately and effectively will improve  Outcome: PROGRESSING AS EXPECTED     Problem: Infection  Goal: Will remain free from infection  Outcome: PROGRESSING AS EXPECTED     Problem: Pain Management  Goal: Pain level will decrease to patient's comfort goal  Outcome: PROGRESSING AS EXPECTED  Note: Patients pain is controlled with positioning. Patient denies need for pain medication at this time.

## 2020-08-14 NOTE — PROGRESS NOTES
Looks good   Tolerated clears   serosang drainage fro DAVID   Abdomen improved   ADAT   Continue sliding scale   Resume oral hypoglycemics tomorrow   Wbc Normal afebrile   Ruptured appy   Continue IV abx   Cultures pending   Path pending , need to confirm appendix removal

## 2020-08-14 NOTE — PROGRESS NOTES
Report received from PACU RN.Pt has stable vitals with no complains of pain in the abdomen.Meds given.IV fluids started. DAVID drain in use. IS given. All needs met. No nausea. Call light in place. Bed in low position.Rounding in plac

## 2020-08-14 NOTE — H&P
DATE OF ADMISSION:  08/13/2020    REASON FOR ADMISSION:  Ruptured appendicitis.    HISTORY:  The patient is 64 years of age.  Presents to the emergency room with   right lower quadrant abdominal pain of 3 days' duration.  He indicated he   fell ill on Monday.  He has had increasing abdominal distention and localizing   pain in the right lower quadrant.  He claims to have had fever, but no   chills.  Patient has been able to eat without significant gastrointestinal   symptoms.  He has not had any chronic bowel dysfunction and specifically he   has not had diarrhea or hematochezia.  He has not had problems with his   digestion previously.  He has been urinating reasonably well without dysuria.    He has not had viral symptoms.  He was found to have significant right lower   quadrant abdominal tenderness.  His white count was normal.  A CT scan was   done, which shows changes consistent with ruptured appendicitis.  There was   some diffuse thickening of the terminal ileum and interloop abscess, most   likely containing the appendix.  Patient was felt to be a possible candidate   for percutaneous drainage, but interventional radiology was not initially   available but due to divert at the [facility] hospital, they felt they may be able   to do it here later this evening, but it was not certain.  Therefore, we have   made the decision, the patient will proceed to surgical intervention for   drainage and hopefully appendectomy.  This will be done laparoscopically or   open.    PAST MEDICAL HISTORY:  Patient's past medical history is significant for   hypertension and diabetes.  He is not insulin dependent.  He is not known to   have allergies to medications.  He had previous corneal transplants and also   he has had some orthopedic surgery on his left leg in the remote past.  He has   been under doctor's care.    SOCIAL HISTORY:  He is a nonsmoker and only consumes alcohol on rare   occasions.    FAMILY HISTORY:  There is no  contributory family history.    REVIEW OF SYSTEMS:  Otherwise negative and noncontributory in 10 categories   except as noted above and this includes his right lower quadrant abdominal   pain.    PHYSICAL EXAMINATION:  GENERAL:  Physically, he is a small  male of stated age, 5 feet 3   inches tall with a BMI of 26.    VITAL SIGNS:  He has normal vital signs today.  He is not tachycardic or   febrile.  HEENT:  Unremarkable.  NECK:  Supple.  The thyroid is normal.  His range of motion is normal.  LUNGS:  Clear, though hypoventilatory.  CARDIAC:  Normal.  BREASTS:  Normal.  ABDOMEN:  Slightly distended.  He has significant localizing tenderness with   guarding in the right lower quadrant.  The patient has a vague appreciation of   a mass there.  The patient has no inguinal hernias.  GENITALIA:  Normal.    EXTREMITIES:  There is some deformity of the left leg and the ankle area.    There is otherwise no extremity deformities or abnormalities.  He does move   all of his extremities.    NEUROLOGIC:  His cognition appears to be intact and he is neurologically   normal.    LABORATORY DATA:  Patient's laboratory is basically summarized above.  He does   have some glycosuria.  He has a white count of 9400 with a platelet count of   134,000.  His lipase was normal.  Sodium 133, glucose was 139 at 8 this   morning.  He did have a COVID test, which was negative.      DIAGNOSTIC DATA:  Radiologically, he had a CT scan of his abdomen.  This does   show a vague pulmonary infiltrate in the right lateral lung of uncertain   significance.  This was felt potentially to the radiologist to represent   atelectasis.  He does have evidence of ruptured appendicitis with a 4 cm   interloop abscess in the right lower quadrant and thickening of multiple   distal ileal bowel loops.  This was felt most likely to be reactive due to the   intraabdominal inflammation.    IMPRESSION:  At the time of admission is ruptured appendicitis.  Plan  is for   laparoscopy and drainage of the abscess and hopefully appendectomy.  The   risks, possible complications of such operation were explained to the patient   in detail.  He understands he will require postoperative hospitalization and   he has at risk for complications, particularly in light of his diabetes.    Patient is willing and ready to proceed.  Consents have been signed and   surgery will proceed later this evening.       ____________________________________     MD VIDA CHAMBERS / [facility]    DD:  08/13/2020 16:19:50  DT:  08/13/2020 18:32:07    D#:  [id number]  Job#:  [id number]

## 2020-08-15 ENCOUNTER — HOSPITAL ENCOUNTER (INPATIENT)
Facility: MEDICAL CENTER | Age: 65
LOS: 2 days | DRG: 948 | End: 2020-08-17
Attending: EMERGENCY MEDICINE | Admitting: INTERNAL MEDICINE
Payer: MEDICARE

## 2020-08-15 ENCOUNTER — APPOINTMENT (OUTPATIENT)
Dept: RADIOLOGY | Facility: MEDICAL CENTER | Age: 65
DRG: 948 | End: 2020-08-15
Attending: EMERGENCY MEDICINE
Payer: MEDICARE

## 2020-08-15 VITALS
WEIGHT: 144.84 LBS | HEART RATE: 91 BPM | HEIGHT: 63 IN | RESPIRATION RATE: 18 BRPM | BODY MASS INDEX: 25.66 KG/M2 | OXYGEN SATURATION: 92 % | DIASTOLIC BLOOD PRESSURE: 79 MMHG | SYSTOLIC BLOOD PRESSURE: 153 MMHG | TEMPERATURE: 98 F

## 2020-08-15 DIAGNOSIS — R00.0 TACHYCARDIA: ICD-10-CM

## 2020-08-15 DIAGNOSIS — E11.10 DIABETIC KETOACIDOSIS WITHOUT COMA ASSOCIATED WITH TYPE 2 DIABETES MELLITUS (HCC): ICD-10-CM

## 2020-08-15 DIAGNOSIS — R41.82 ALTERED MENTAL STATUS, UNSPECIFIED ALTERED MENTAL STATUS TYPE: ICD-10-CM

## 2020-08-15 PROBLEM — K35.219 ACUTE APPENDICITIS WITH GENERALIZED PERITONITIS AND ABSCESS, WITHOUT GANGRENE: Status: ACTIVE | Noted: 2020-08-15

## 2020-08-15 LAB
ALBUMIN SERPL BCP-MCNC: 3.2 G/DL (ref 3.2–4.9)
ALBUMIN/GLOB SERPL: 0.9 G/DL
ALP SERPL-CCNC: 101 U/L (ref 30–99)
ALT SERPL-CCNC: 38 U/L (ref 2–50)
AMPHET UR QL SCN: NEGATIVE
ANION GAP SERPL CALC-SCNC: 19 MMOL/L (ref 7–16)
APPEARANCE UR: CLEAR
AST SERPL-CCNC: 39 U/L (ref 12–45)
B-OH-BUTYR SERPL-MCNC: 2.73 MMOL/L (ref 0.02–0.27)
BACTERIA #/AREA URNS HPF: NEGATIVE /HPF
BARBITURATES UR QL SCN: NEGATIVE
BASE EXCESS BLDV CALC-SCNC: -4 MMOL/L
BASOPHILS # BLD AUTO: 0.9 % (ref 0–1.8)
BASOPHILS # BLD: 0.08 K/UL (ref 0–0.12)
BENZODIAZ UR QL SCN: NEGATIVE
BILIRUB SERPL-MCNC: 0.5 MG/DL (ref 0.1–1.5)
BILIRUB UR QL STRIP.AUTO: NEGATIVE
BLOOD CULTURE HOLD CXBCH: NORMAL
BLOOD CULTURE HOLD CXBCH: NORMAL
BODY TEMPERATURE: ABNORMAL CENTIGRADE
BUN SERPL-MCNC: 15 MG/DL (ref 8–22)
BZE UR QL SCN: NEGATIVE
CALCIUM SERPL-MCNC: 8.7 MG/DL (ref 8.5–10.5)
CANNABINOIDS UR QL SCN: NEGATIVE
CHLORIDE SERPL-SCNC: 104 MMOL/L (ref 96–112)
CO2 SERPL-SCNC: 18 MMOL/L (ref 20–33)
COLOR UR: YELLOW
CREAT SERPL-MCNC: 0.71 MG/DL (ref 0.5–1.4)
EOSINOPHIL # BLD AUTO: 0.15 K/UL (ref 0–0.51)
EOSINOPHIL NFR BLD: 1.7 % (ref 0–6.9)
EPI CELLS #/AREA URNS HPF: NEGATIVE /HPF
ERYTHROCYTE [DISTWIDTH] IN BLOOD BY AUTOMATED COUNT: 41.5 FL (ref 35.9–50)
ETHANOL BLD-MCNC: <10.1 MG/DL (ref 0–10.1)
GIANT PLATELETS BLD QL SMEAR: NORMAL
GLOBULIN SER CALC-MCNC: 3.4 G/DL (ref 1.9–3.5)
GLUCOSE BLD-MCNC: 134 MG/DL (ref 65–99)
GLUCOSE BLD-MCNC: 206 MG/DL (ref 65–99)
GLUCOSE BLD-MCNC: 339 MG/DL (ref 65–99)
GLUCOSE SERPL-MCNC: 234 MG/DL (ref 65–99)
GLUCOSE UR STRIP.AUTO-MCNC: >=1000 MG/DL
HCO3 BLDV-SCNC: 22 MMOL/L (ref 24–28)
HCT VFR BLD AUTO: 43.7 % (ref 42–52)
HGB BLD-MCNC: 14.7 G/DL (ref 14–18)
HYALINE CASTS #/AREA URNS LPF: ABNORMAL /LPF
KETONES UR STRIP.AUTO-MCNC: 80 MG/DL
LEUKOCYTE ESTERASE UR QL STRIP.AUTO: NEGATIVE
LYMPHOCYTES # BLD AUTO: 0.78 K/UL (ref 1–4.8)
LYMPHOCYTES NFR BLD: 8.7 % (ref 22–41)
MAGNESIUM SERPL-MCNC: 2.2 MG/DL (ref 1.5–2.5)
MANUAL DIFF BLD: NORMAL
MCH RBC QN AUTO: 30.2 PG (ref 27–33)
MCHC RBC AUTO-ENTMCNC: 33.6 G/DL (ref 33.7–35.3)
MCV RBC AUTO: 89.7 FL (ref 81.4–97.8)
METHADONE UR QL SCN: NEGATIVE
MICRO URNS: ABNORMAL
MONOCYTES # BLD AUTO: 1.1 K/UL (ref 0–0.85)
MONOCYTES NFR BLD AUTO: 12.2 % (ref 0–13.4)
MORPHOLOGY BLD-IMP: NORMAL
NEUTROPHILS # BLD AUTO: 6.89 K/UL (ref 1.82–7.42)
NEUTROPHILS NFR BLD: 76.5 % (ref 44–72)
NITRITE UR QL STRIP.AUTO: NEGATIVE
NRBC # BLD AUTO: 0 K/UL
NRBC BLD-RTO: 0 /100 WBC
OPIATES UR QL SCN: NEGATIVE
OSMOLALITY SERPL: 299 MOSM/KG H2O (ref 278–298)
OXYCODONE UR QL SCN: NEGATIVE
PCO2 BLDV: 43.1 MMHG (ref 41–51)
PCP UR QL SCN: NEGATIVE
PH BLDV: 7.33 [PH] (ref 7.31–7.45)
PH UR STRIP.AUTO: 5 [PH] (ref 5–8)
PLATELET # BLD AUTO: 228 K/UL (ref 164–446)
PLATELET BLD QL SMEAR: NORMAL
PMV BLD AUTO: 9.6 FL (ref 9–12.9)
PO2 BLDV: 80 MMHG (ref 25–40)
POTASSIUM SERPL-SCNC: 3.3 MMOL/L (ref 3.6–5.5)
PROPOXYPH UR QL SCN: NEGATIVE
PROT SERPL-MCNC: 6.6 G/DL (ref 6–8.2)
PROT UR QL STRIP: 30 MG/DL
RBC # BLD AUTO: 4.87 M/UL (ref 4.7–6.1)
RBC # URNS HPF: ABNORMAL /HPF
RBC BLD AUTO: PRESENT
RBC UR QL AUTO: ABNORMAL
SAO2 % BLDV: 93.6 %
SODIUM SERPL-SCNC: 141 MMOL/L (ref 135–145)
SP GR UR STRIP.AUTO: 1.04
UROBILINOGEN UR STRIP.AUTO-MCNC: 0.2 MG/DL
WBC # BLD AUTO: 9 K/UL (ref 4.8–10.8)
WBC #/AREA URNS HPF: ABNORMAL /HPF

## 2020-08-15 PROCEDURE — 700111 HCHG RX REV CODE 636 W/ 250 OVERRIDE (IP): Performed by: EMERGENCY MEDICINE

## 2020-08-15 PROCEDURE — A9270 NON-COVERED ITEM OR SERVICE: HCPCS | Performed by: SURGERY

## 2020-08-15 PROCEDURE — 74177 CT ABD & PELVIS W/CONTRAST: CPT | Mod: MG

## 2020-08-15 PROCEDURE — 700102 HCHG RX REV CODE 250 W/ 637 OVERRIDE(OP): Performed by: SURGERY

## 2020-08-15 PROCEDURE — 96365 THER/PROPH/DIAG IV INF INIT: CPT

## 2020-08-15 PROCEDURE — 700111 HCHG RX REV CODE 636 W/ 250 OVERRIDE (IP): Performed by: STUDENT IN AN ORGANIZED HEALTH CARE EDUCATION/TRAINING PROGRAM

## 2020-08-15 PROCEDURE — 36415 COLL VENOUS BLD VENIPUNCTURE: CPT

## 2020-08-15 PROCEDURE — 96375 TX/PRO/DX INJ NEW DRUG ADDON: CPT

## 2020-08-15 PROCEDURE — 82803 BLOOD GASES ANY COMBINATION: CPT

## 2020-08-15 PROCEDURE — 82962 GLUCOSE BLOOD TEST: CPT

## 2020-08-15 PROCEDURE — 700105 HCHG RX REV CODE 258: Performed by: EMERGENCY MEDICINE

## 2020-08-15 PROCEDURE — 83735 ASSAY OF MAGNESIUM: CPT

## 2020-08-15 PROCEDURE — A9270 NON-COVERED ITEM OR SERVICE: HCPCS | Performed by: STUDENT IN AN ORGANIZED HEALTH CARE EDUCATION/TRAINING PROGRAM

## 2020-08-15 PROCEDURE — 700102 HCHG RX REV CODE 250 W/ 637 OVERRIDE(OP): Performed by: STUDENT IN AN ORGANIZED HEALTH CARE EDUCATION/TRAINING PROGRAM

## 2020-08-15 PROCEDURE — 700105 HCHG RX REV CODE 258: Performed by: STUDENT IN AN ORGANIZED HEALTH CARE EDUCATION/TRAINING PROGRAM

## 2020-08-15 PROCEDURE — 85027 COMPLETE CBC AUTOMATED: CPT

## 2020-08-15 PROCEDURE — 80053 COMPREHEN METABOLIC PANEL: CPT

## 2020-08-15 PROCEDURE — 700111 HCHG RX REV CODE 636 W/ 250 OVERRIDE (IP): Performed by: SURGERY

## 2020-08-15 PROCEDURE — 81001 URINALYSIS AUTO W/SCOPE: CPT

## 2020-08-15 PROCEDURE — 71045 X-RAY EXAM CHEST 1 VIEW: CPT

## 2020-08-15 PROCEDURE — 82010 KETONE BODYS QUAN: CPT

## 2020-08-15 PROCEDURE — 87040 BLOOD CULTURE FOR BACTERIA: CPT | Mod: 91

## 2020-08-15 PROCEDURE — 80307 DRUG TEST PRSMV CHEM ANLYZR: CPT

## 2020-08-15 PROCEDURE — 70450 CT HEAD/BRAIN W/O DYE: CPT | Mod: ME

## 2020-08-15 PROCEDURE — 770020 HCHG ROOM/CARE - TELE (206)

## 2020-08-15 PROCEDURE — 99285 EMERGENCY DEPT VISIT HI MDM: CPT

## 2020-08-15 PROCEDURE — 96372 THER/PROPH/DIAG INJ SC/IM: CPT

## 2020-08-15 PROCEDURE — 85007 BL SMEAR W/DIFF WBC COUNT: CPT

## 2020-08-15 PROCEDURE — 700112 HCHG RX REV CODE 229: Performed by: SURGERY

## 2020-08-15 PROCEDURE — 83930 ASSAY OF BLOOD OSMOLALITY: CPT

## 2020-08-15 PROCEDURE — 700105 HCHG RX REV CODE 258: Performed by: SURGERY

## 2020-08-15 PROCEDURE — 700117 HCHG RX CONTRAST REV CODE 255: Performed by: EMERGENCY MEDICINE

## 2020-08-15 PROCEDURE — 82962 GLUCOSE BLOOD TEST: CPT | Mod: 91

## 2020-08-15 RX ORDER — HEPARIN SODIUM 5000 [USP'U]/ML
5000 INJECTION, SOLUTION INTRAVENOUS; SUBCUTANEOUS EVERY 8 HOURS
Status: DISCONTINUED | OUTPATIENT
Start: 2020-08-15 | End: 2020-08-15 | Stop reason: HOSPADM

## 2020-08-15 RX ORDER — OXYCODONE HYDROCHLORIDE 5 MG/1
5 TABLET ORAL EVERY 4 HOURS PRN
Qty: 30 TAB | Refills: 0 | Status: SHIPPED | OUTPATIENT
Start: 2020-08-15 | End: 2020-08-15

## 2020-08-15 RX ORDER — DEXTROSE MONOHYDRATE 25 G/50ML
50 INJECTION, SOLUTION INTRAVENOUS
Status: DISCONTINUED | OUTPATIENT
Start: 2020-08-15 | End: 2020-08-15

## 2020-08-15 RX ORDER — AMOXICILLIN 250 MG
2 CAPSULE ORAL 2 TIMES DAILY
Status: DISCONTINUED | OUTPATIENT
Start: 2020-08-15 | End: 2020-08-17 | Stop reason: HOSPADM

## 2020-08-15 RX ORDER — SODIUM CHLORIDE 9 MG/ML
1000 INJECTION, SOLUTION INTRAVENOUS ONCE
Status: COMPLETED | OUTPATIENT
Start: 2020-08-15 | End: 2020-08-15

## 2020-08-15 RX ORDER — GLIMEPIRIDE 4 MG/1
4 TABLET ORAL 2 TIMES DAILY
COMMUNITY

## 2020-08-15 RX ORDER — LABETALOL HYDROCHLORIDE 5 MG/ML
10 INJECTION, SOLUTION INTRAVENOUS
Status: DISCONTINUED | OUTPATIENT
Start: 2020-08-15 | End: 2020-08-17 | Stop reason: HOSPADM

## 2020-08-15 RX ORDER — DIPHENHYDRAMINE HYDROCHLORIDE 50 MG/ML
50 INJECTION INTRAMUSCULAR; INTRAVENOUS ONCE
Status: COMPLETED | OUTPATIENT
Start: 2020-08-15 | End: 2020-08-15

## 2020-08-15 RX ORDER — ATORVASTATIN CALCIUM 40 MG/1
40 TABLET, FILM COATED ORAL DAILY
Status: DISCONTINUED | OUTPATIENT
Start: 2020-08-16 | End: 2020-08-17 | Stop reason: HOSPADM

## 2020-08-15 RX ORDER — LORAZEPAM 2 MG/ML
INJECTION INTRAMUSCULAR
Status: DISPENSED
Start: 2020-08-15 | End: 2020-08-16

## 2020-08-15 RX ORDER — ATORVASTATIN CALCIUM 40 MG/1
40 TABLET, FILM COATED ORAL DAILY
COMMUNITY
End: 2020-08-31 | Stop reason: SDUPTHER

## 2020-08-15 RX ORDER — POTASSIUM CHLORIDE 20 MEQ/1
40 TABLET, EXTENDED RELEASE ORAL ONCE
Status: COMPLETED | OUTPATIENT
Start: 2020-08-16 | End: 2020-08-15

## 2020-08-15 RX ORDER — LORAZEPAM 1 MG/1
1 TABLET ORAL EVERY 4 HOURS PRN
Status: DISCONTINUED | OUTPATIENT
Start: 2020-08-15 | End: 2020-08-15

## 2020-08-15 RX ORDER — SODIUM CHLORIDE 9 MG/ML
2000 INJECTION, SOLUTION INTRAVENOUS ONCE
Status: COMPLETED | OUTPATIENT
Start: 2020-08-15 | End: 2020-08-15

## 2020-08-15 RX ORDER — SODIUM CHLORIDE AND POTASSIUM CHLORIDE 150; 900 MG/100ML; MG/100ML
INJECTION, SOLUTION INTRAVENOUS CONTINUOUS
Status: DISCONTINUED | OUTPATIENT
Start: 2020-08-15 | End: 2020-08-15

## 2020-08-15 RX ORDER — LORAZEPAM 2 MG/ML
1 INJECTION INTRAMUSCULAR
Status: DISCONTINUED | OUTPATIENT
Start: 2020-08-15 | End: 2020-08-15

## 2020-08-15 RX ORDER — POLYETHYLENE GLYCOL 3350 17 G/17G
1 POWDER, FOR SOLUTION ORAL
Status: DISCONTINUED | OUTPATIENT
Start: 2020-08-15 | End: 2020-08-17 | Stop reason: HOSPADM

## 2020-08-15 RX ORDER — LORAZEPAM 1 MG/1
0.5 TABLET ORAL EVERY 4 HOURS PRN
Status: DISCONTINUED | OUTPATIENT
Start: 2020-08-15 | End: 2020-08-15

## 2020-08-15 RX ORDER — DEXTROSE MONOHYDRATE 25 G/50ML
50 INJECTION, SOLUTION INTRAVENOUS
Status: DISCONTINUED | OUTPATIENT
Start: 2020-08-15 | End: 2020-08-17 | Stop reason: HOSPADM

## 2020-08-15 RX ORDER — ENALAPRIL MALEATE 2.5 MG/1
2.5 TABLET ORAL DAILY
Status: DISCONTINUED | OUTPATIENT
Start: 2020-08-16 | End: 2020-08-17 | Stop reason: HOSPADM

## 2020-08-15 RX ORDER — HALOPERIDOL 5 MG/ML
5 INJECTION INTRAMUSCULAR ONCE
Status: COMPLETED | OUTPATIENT
Start: 2020-08-15 | End: 2020-08-15

## 2020-08-15 RX ORDER — LORAZEPAM 2 MG/ML
2 INJECTION INTRAMUSCULAR
Status: DISCONTINUED | OUTPATIENT
Start: 2020-08-15 | End: 2020-08-15

## 2020-08-15 RX ORDER — POTASSIUM CHLORIDE 7.45 MG/ML
10 INJECTION INTRAVENOUS
Status: DISCONTINUED | OUTPATIENT
Start: 2020-08-15 | End: 2020-08-15

## 2020-08-15 RX ORDER — ACETAMINOPHEN 325 MG/1
650 TABLET ORAL EVERY 6 HOURS PRN
Status: DISCONTINUED | OUTPATIENT
Start: 2020-08-15 | End: 2020-08-17 | Stop reason: HOSPADM

## 2020-08-15 RX ORDER — LORAZEPAM 2 MG/1
4 TABLET ORAL
Status: DISCONTINUED | OUTPATIENT
Start: 2020-08-15 | End: 2020-08-15

## 2020-08-15 RX ORDER — ENALAPRIL MALEATE 2.5 MG/1
2.5 TABLET ORAL DAILY
Status: ON HOLD | COMMUNITY
End: 2020-08-17

## 2020-08-15 RX ORDER — LORAZEPAM 2 MG/1
2 TABLET ORAL
Status: DISCONTINUED | OUTPATIENT
Start: 2020-08-15 | End: 2020-08-15

## 2020-08-15 RX ORDER — SODIUM CHLORIDE, SODIUM LACTATE, POTASSIUM CHLORIDE, CALCIUM CHLORIDE 600; 310; 30; 20 MG/100ML; MG/100ML; MG/100ML; MG/100ML
1000 INJECTION, SOLUTION INTRAVENOUS CONTINUOUS
Status: DISCONTINUED | OUTPATIENT
Start: 2020-08-16 | End: 2020-08-17 | Stop reason: HOSPADM

## 2020-08-15 RX ORDER — LORAZEPAM 2 MG/ML
0.5 INJECTION INTRAMUSCULAR ONCE
Status: COMPLETED | OUTPATIENT
Start: 2020-08-15 | End: 2020-08-15

## 2020-08-15 RX ORDER — BISACODYL 10 MG
10 SUPPOSITORY, RECTAL RECTAL
Status: DISCONTINUED | OUTPATIENT
Start: 2020-08-15 | End: 2020-08-17 | Stop reason: HOSPADM

## 2020-08-15 RX ORDER — LORAZEPAM 2 MG/ML
2 INJECTION INTRAMUSCULAR ONCE
Status: CANCELLED | OUTPATIENT
Start: 2020-08-15 | End: 2020-08-15

## 2020-08-15 RX ORDER — LORAZEPAM 2 MG/ML
0.5 INJECTION INTRAMUSCULAR EVERY 4 HOURS PRN
Status: DISCONTINUED | OUTPATIENT
Start: 2020-08-15 | End: 2020-08-15

## 2020-08-15 RX ORDER — LORAZEPAM 2 MG/ML
1.5 INJECTION INTRAMUSCULAR
Status: DISCONTINUED | OUTPATIENT
Start: 2020-08-15 | End: 2020-08-15

## 2020-08-15 RX ADMIN — INSULIN HUMAN 4 UNITS: 100 INJECTION, SOLUTION PARENTERAL at 11:01

## 2020-08-15 RX ADMIN — DOCUSATE SODIUM 100 MG: 100 CAPSULE, LIQUID FILLED ORAL at 05:26

## 2020-08-15 RX ADMIN — ENALAPRIL MALEATE 2.5 MG: 5 TABLET ORAL at 05:26

## 2020-08-15 RX ADMIN — SODIUM CHLORIDE 1000 ML: 9 INJECTION, SOLUTION INTRAVENOUS at 19:30

## 2020-08-15 RX ADMIN — LORAZEPAM 0.5 MG: 2 INJECTION INTRAMUSCULAR; INTRAVENOUS at 18:47

## 2020-08-15 RX ADMIN — POTASSIUM CHLORIDE 40 MEQ: 1500 TABLET, EXTENDED RELEASE ORAL at 23:56

## 2020-08-15 RX ADMIN — IOHEXOL 100 ML: 350 INJECTION, SOLUTION INTRAVENOUS at 20:45

## 2020-08-15 RX ADMIN — PIPERACILLIN AND TAZOBACTAM 4.5 G: 4; .5 INJECTION, POWDER, LYOPHILIZED, FOR SOLUTION INTRAVENOUS; PARENTERAL at 05:23

## 2020-08-15 RX ADMIN — PIPERACILLIN AND TAZOBACTAM 3.38 G: 3; .375 INJECTION, POWDER, LYOPHILIZED, FOR SOLUTION INTRAVENOUS; PARENTERAL at 22:55

## 2020-08-15 RX ADMIN — SODIUM CHLORIDE 2000 ML: 9 INJECTION, SOLUTION INTRAVENOUS at 23:53

## 2020-08-15 RX ADMIN — HALOPERIDOL LACTATE 5 MG: 5 INJECTION, SOLUTION INTRAMUSCULAR at 18:51

## 2020-08-15 RX ADMIN — DIPHENHYDRAMINE HYDROCHLORIDE 50 MG: 50 INJECTION INTRAMUSCULAR; INTRAVENOUS at 18:51

## 2020-08-15 ASSESSMENT — ENCOUNTER SYMPTOMS
BLURRED VISION: 0
SHORTNESS OF BREATH: 0

## 2020-08-15 ASSESSMENT — FIBROSIS 4 INDEX
FIB4 SCORE: 2.14
FIB4 SCORE: 1.8

## 2020-08-15 NOTE — PROGRESS NOTES
POD1 s/p lap appy, drain placed for ruptured appendicitis  Feels well this am, pain controlled, tolerating PO  Afebrile, no labs this am  Drain with 40cc SSG  WBC7    Plan:   regular diet  dvt prophylaixs  Drain teaching  Discharge today, no further abx  F/u with Dr. Rowan in 1 week  No heavy (10lbs) lifting for 6 weeks  Ok to shower, no bath or swimming for 2 weeks

## 2020-08-15 NOTE — PROGRESS NOTES
Bedside report received from day RN. Pt is AAO x 4.  Pt denies pain. POC discussed with continue on IV antibiotics and fluids. All needs met at this time.Bed in low position.Call light within reach.Rounding in place.

## 2020-08-15 NOTE — PROGRESS NOTES
Pt up to chair for breakfast. VSS. Pain under control. Discussed POC for the day. Should discharge. Lap stabs CDI. DAVID site intact. Will monitor.

## 2020-08-15 NOTE — PROGRESS NOTES
Discharge instructions provided with Daughter on the phone, per pt request. Discussed activity, follow up and medications. Discussed DAVID drain instructions and home care. Pt states understanding of this as well as surgical instructions. Questions encouraged and answered. Pt escorted to private transportation by staff.

## 2020-08-15 NOTE — DISCHARGE INSTRUCTIONS
Discharge Instructions    Discharged to home by car with relative. Discharged via wheelchair, hospital escort: Yes.  Special equipment needed: Not Applicable    Be sure to schedule a follow-up appointment with your primary care doctor or any specialists as instructed.     Discharge Plan:        I understand that a diet low in cholesterol, fat, and sodium is recommended for good health. Unless I have been given specific instructions below for another diet, I accept this instruction as my diet prescription.   Other diet: regular diet    Special Instructions:       Laparoscopic Appendectomy, Adult, Care After  This sheet gives you information about how to care for yourself after your procedure. Your health care provider may also give you more specific instructions. If you have problems or questions, contact your health care provider.  What can I expect after the procedure?  After the procedure, it is common to have:  · Little energy for normal activities.  · Mild pain in the area where the incisions were made.  · Difficulty passing stool (constipation). This can be caused by:  ? Pain medicine.  ? A decrease in your activity.  Follow these instructions at home:  Medicines  · Take over-the-counter and prescription medicines only as told by your health care provider.  · If you were prescribed an antibiotic medicine, take it as told by your health care provider. Do not stop taking the antibiotic even if you start to feel better.  · Do not drive or use heavy machinery while taking prescription pain medicine.  · Ask your health care provider if the medicine prescribed to you can cause constipation. You may need to take steps to prevent or treat constipation, such as:  ? Drink enough fluid to keep your urine pale yellow.  ? Take over-the-counter or prescription medicines.  ? Eat foods that are high in fiber, such as beans, whole grains, and fresh fruits and vegetables.  ? Limit foods that are high in fat and processed sugars,  such as fried or sweet foods.  Incision care    · Follow instructions from your health care provider about how to take care of your incisions. Make sure you:  ? Wash your hands with soap and water before and after you change your bandage (dressing). If soap and water are not available, use hand .  ? Change your dressing as told by your health care provider.  ? Leave stitches (sutures), skin glue, or adhesive strips in place. These skin closures may need to stay in place for 2 weeks or longer. If adhesive strip edges start to loosen and curl up, you may trim the loose edges. Do not remove adhesive strips completely unless your health care provider tells you to do that.  · Check your incision areas every day for signs of infection. Check for:  ? Redness, swelling, or pain.  ? Fluid or blood.  ? Warmth.  ? Pus or a bad smell.  Bathing  · Keep your incisions clean and dry. Clean them as often as told by your health care provider. To do this:  1. Gently wash the incisions with soap and water.  2. Rinse the incisions with water to remove all soap.  3. Pat the incisions dry with a clean towel. Do not rub the incisions.  · Do not take baths, swim, or use a hot tub for 2 weeks, or until your health care provider approves. You may take showers after 48 hours.  Activity    · Do not drive for 24 hours if you were given a sedative during your procedure.  · Rest after the procedure. Return to your normal activities as told by your health care provider. Ask your health care provider what activities are safe for you.  · For 3 weeks, or for as long as told by your health care provider:  ? Do not lift anything that is heavier than 10 lb (4.5 kg), or the limit that you are told.  ? Do not play contact sports.  General instructions  · If you were sent home with a drain, follow instructions from your health care provider about how to care for it.  · Take deep breaths. This helps to prevent your lungs from developing an  infection (pneumonia).  · Keep all follow-up visits as told by your health care provider. This is important.  Contact a health care provider if:  · You have redness, swelling, or pain around an incision.  · You have fluid or blood coming from an incision.  · Your incision feels warm to the touch.  · You have pus or a bad smell coming from an incision or dressing.  · Your incision edges break open after your sutures have been removed.  · You have increasing pain in your shoulders.  · You feel dizzy or you faint.  · You develop shortness of breath.  · You keep feeling nauseous or you are vomiting.  · You have diarrhea or you cannot control your bowel functions.  · You lose your appetite.  · You develop swelling or pain in your legs.  · You develop a rash.  Get help right away if you have:  · A fever.  · Difficulty breathing.  · Sharp pains in your chest.  Summary  · After a laparoscopic appendectomy, it is common to have little energy for normal activities, mild pain in the area of the incisions, and constipation.  · Infection is the most common complication after this procedure. Follow your health care provider's instructions about caring for yourself after the procedure.  · Rest after the procedure. Return to your normal activities as told by your health care provider.  · Contact your health care provider if you notice signs of infection around your incisions or you develop shortness of breath. Get help right away if you have a fever, chest pain, or difficulty breathing.  This information is not intended to replace advice given to you by your health care provider. Make sure you discuss any questions you have with your health care provider.  Document Released: 12/18/2006 Document Revised: 06/20/2019 Document Reviewed: 06/20/2019  Elsevier Patient Education © 2020 [employer]    Surgical Drain Home Care  Surgical drains are used to remove extra fluid that normally builds up in a surgical wound after surgery. A  surgical drain helps to heal a surgical wound. Different kinds of surgical drains include:  · Active drains. These drains use suction to pull drainage away from the surgical wound. Drainage flows through a tube to a container outside of the body. With these drains, you need to keep the bulb or the drainage container flat (compressed) at all times, except while you empty it. Flattening the bulb or container creates suction.  · Passive drains. These drains allow fluid to drain naturally, by gravity. Drainage flows through a tube to a bandage (dressing) or a container outside of the body. Passive drains do not need to be emptied.  A drain is placed during surgery. Right after surgery, drainage is usually bright red and a little thicker than water. The drainage may gradually turn yellow or pink and become thinner. It is likely that your health care provider will remove the drain when the drainage stops or when the amount decreases to 1-2 Tbsp (15-30 mL) during a 24-hour period.  Supplies needed:  · Tape.  · Germ-free cleaning solution (sterile saline).  · Cotton swabs.  · Split gauze drain sponge: 4 x 4 inches (10 x 10 cm).  · Gauze square: 4 x 4 inches (10 x 10 cm).  How to care for your surgical drain  Care for your drain as told by your health care provider. This is important to help prevent infection. If your drain is placed at your back, or any other hard-to-reach area, ask another person to assist you in performing the following tasks:  General care  · Keep the skin around the drain dry and covered with a dressing at all times.  · Check your drain area every day for signs of infection. Check for:  ? Redness, swelling, or pain.  ? Pus or a bad smell.  ? Cloudy drainage.  ? Tenderness or pressure at the drain exit site.  Changing the dressing  Follow instructions from your health care provider about how to change your dressing. Change your dressing at least once a day. Change it more often if needed to keep the  dressing dry. Make sure you:  1. Gather your supplies.  2. Wash your hands with soap and water before you change your dressing. If soap and water are not available, use hand .  3. Remove the old dressing. Avoid using scissors to do that.  4. Wash your hands with soap and water again after removing the old dressing.  5. Use sterile saline to clean your skin around the drain. You may need to use a cotton swab to clean the skin.  6. Place the tube through the slit in a drain sponge. Place the drain sponge so that it covers your wound.  7. Place the gauze square or another drain sponge on top of the drain sponge that is on the wound. Make sure the tube is between those layers.  8. Tape the dressing to your skin.  9. Tape the drainage tube to your skin 1-2 inches (2.5-5 cm) below the place where the tube enters your body. Taping keeps the tube from pulling on any stitches (sutures) that you have.  10. Wash your hands with soap and water.  11. Write down the color of your drainage and how often you change your dressing.  How to empty your active drain    1. Make sure that you have a measuring cup that you can empty your drainage into.  2. Wash your hands with soap and water. If soap and water are not available, use hand .  3. Loosen any pins or clips that hold the tube in place.  4. If your health care provider tells you to strip the tube to prevent clots and tube blockages:  ? Hold the tube at the skin with one hand. Use your other hand to pinch the tubing with your thumb and first finger.  ? Gently move your fingers down the tube while squeezing very lightly. This clears any drainage, clots, or tissue from the tube.  ? You may need to do this several times each day to keep the tube clear. Do not pull on the tube.  5. Open the bulb cap or the drain plug. Do not touch the inside of the cap or the bottom of the plug.  6. Turn the device upside down and gently squeeze.  7. Empty all of the drainage into  the measuring cup.  8. Compress the bulb or the container and replace the cap or the plug. To compress the bulb or the container, squeeze it firmly in the middle while you close the cap or plug the container.  9. Write down the amount of drainage that you have in each 24-hour period. If you have less than 2 Tbsp (30 mL) of drainage during 24 hours, contact your health care provider.  10. Flush the drainage down the toilet.  11. Wash your hands with soap and water.  Contact a health care provider if:  · You have redness, swelling, or pain around your drain area.  · You have pus or a bad smell coming from your drain area.  · You have a fever or chills.  · The skin around your drain is warm to the touch.  · The amount of drainage that you have is increasing instead of decreasing.  · You have drainage that is cloudy.  · There is a sudden stop or a sudden decrease in the amount of drainage that you have.  · Your drain tube falls out.  · Your active drain does not stay compressed after you empty it.  Summary  · Surgical drains are used to remove extra fluid that normally builds up in a surgical wound after surgery.  · Different kinds of surgical drains include active drains and passive drains. Active drains use suction to pull drainage away from the surgical wound, and passive drains allow fluid to drain naturally.  · It is important to care for your drain to prevent infection. If your drain is placed at your back, or any other hard-to-reach area, ask another person to assist you.  · Contact your health care provider if you have redness, swelling, or pain around your drain area.  This information is not intended to replace advice given to you by your health care provider. Make sure you discuss any questions you have with your health care provider.  Document Released: 12/15/2001 Document Revised: 01/22/2020 Document Reviewed: 01/22/2020  Elsevier Patient Education © 2020 Elsevier Inc.        · Is patient discharged on  Warfarin / Coumadin?   No     Depression / Suicide Risk    As you are discharged from this [facility] facility, it is important to learn how to keep safe from harming yourself.    Recognize the warning signs:  · Abrupt changes in personality, positive or negative- including increase in energy   · Giving away possessions  · Change in eating patterns- significant weight changes-  positive or negative  · Change in sleeping patterns- unable to sleep or sleeping all the time   · Unwillingness or inability to communicate  · Depression  · Unusual sadness, discouragement and loneliness  · Talk of wanting to die  · Neglect of personal appearance   · Rebelliousness- reckless behavior  · Withdrawal from people/activities they love  · Confusion- inability to concentrate     If you or a loved one observes any of these behaviors or has concerns about self-harm, here's what you can do:  · Talk about it- your feelings and reasons for harming yourself  · Remove any means that you might use to hurt yourself (examples: pills, rope, extension cords, firearm)  · Get professional help from the community (Mental Health, Substance Abuse, psychological counseling)  · Do not be alone:Call your Safe Contact- someone whom you trust who will be there for you.  · Call your local CRISIS HOTLINE 863-8482 or 637-064-8155  · Call your local Children's Mobile Crisis Response Team [place] (103) 036-8126 or www.Qewz  · Call the toll free National Suicide Prevention Hotlines   · National Suicide Prevention Lifeline 221-275-KLNL (5963)  · National Hope Line Network 800-SUICIDE (292-9027)

## 2020-08-16 PROBLEM — Z90.49 S/P LAPAROSCOPIC APPENDECTOMY: Status: ACTIVE | Noted: 2020-08-16

## 2020-08-16 PROBLEM — R41.82 ALTERED MENTAL STATUS, UNSPECIFIED: Status: ACTIVE | Noted: 2020-08-16

## 2020-08-16 PROBLEM — Z78.9 ALCOHOL USE: Status: ACTIVE | Noted: 2020-08-16

## 2020-08-16 PROBLEM — E11.9 TYPE 2 DIABETES MELLITUS (HCC): Status: ACTIVE | Noted: 2020-08-16

## 2020-08-16 LAB
ANION GAP SERPL CALC-SCNC: 12 MMOL/L (ref 7–16)
ANION GAP SERPL CALC-SCNC: 16 MMOL/L (ref 7–16)
BACTERIA SPEC ANAEROBE CULT: ABNORMAL
BACTERIA SPEC ANAEROBE CULT: ABNORMAL
BUN SERPL-MCNC: 11 MG/DL (ref 8–22)
BUN SERPL-MCNC: 9 MG/DL (ref 8–22)
CALCIUM SERPL-MCNC: 8 MG/DL (ref 8.5–10.5)
CALCIUM SERPL-MCNC: 8.5 MG/DL (ref 8.5–10.5)
CHLORIDE SERPL-SCNC: 109 MMOL/L (ref 96–112)
CHLORIDE SERPL-SCNC: 113 MMOL/L (ref 96–112)
CO2 SERPL-SCNC: 16 MMOL/L (ref 20–33)
CO2 SERPL-SCNC: 18 MMOL/L (ref 20–33)
COVID ORDER STATUS COVID19: NORMAL
CREAT SERPL-MCNC: 0.48 MG/DL (ref 0.5–1.4)
CREAT SERPL-MCNC: 0.55 MG/DL (ref 0.5–1.4)
ERYTHROCYTE [DISTWIDTH] IN BLOOD BY AUTOMATED COUNT: 42.7 FL (ref 35.9–50)
FOLATE SERPL-MCNC: 26.9 NG/ML
GLUCOSE BLD-MCNC: 138 MG/DL (ref 65–99)
GLUCOSE BLD-MCNC: 161 MG/DL (ref 65–99)
GLUCOSE BLD-MCNC: 186 MG/DL (ref 65–99)
GLUCOSE SERPL-MCNC: 126 MG/DL (ref 65–99)
GLUCOSE SERPL-MCNC: 128 MG/DL (ref 65–99)
HCT VFR BLD AUTO: 42.1 % (ref 42–52)
HGB BLD-MCNC: 14.5 G/DL (ref 14–18)
MAGNESIUM SERPL-MCNC: 2.1 MG/DL (ref 1.5–2.5)
MAGNESIUM SERPL-MCNC: 2.2 MG/DL (ref 1.5–2.5)
MCH RBC QN AUTO: 31.3 PG (ref 27–33)
MCHC RBC AUTO-ENTMCNC: 34.4 G/DL (ref 33.7–35.3)
MCV RBC AUTO: 90.7 FL (ref 81.4–97.8)
PHOSPHATE SERPL-MCNC: 2.9 MG/DL (ref 2.5–4.5)
PHOSPHATE SERPL-MCNC: 3.2 MG/DL (ref 2.5–4.5)
PLATELET # BLD AUTO: 211 K/UL (ref 164–446)
PMV BLD AUTO: 9.7 FL (ref 9–12.9)
POTASSIUM SERPL-SCNC: 3.9 MMOL/L (ref 3.6–5.5)
POTASSIUM SERPL-SCNC: 4.3 MMOL/L (ref 3.6–5.5)
RBC # BLD AUTO: 4.64 M/UL (ref 4.7–6.1)
SARS-COV-2 RNA RESP QL NAA+PROBE: NOTDETECTED
SIGNIFICANT IND 70042: ABNORMAL
SITE SITE: ABNORMAL
SODIUM SERPL-SCNC: 139 MMOL/L (ref 135–145)
SODIUM SERPL-SCNC: 145 MMOL/L (ref 135–145)
SOURCE SOURCE: ABNORMAL
SPECIMEN SOURCE: NORMAL
TSH SERPL DL<=0.005 MIU/L-ACNC: 2.22 UIU/ML (ref 0.38–5.33)
VIT B12 SERPL-MCNC: 833 PG/ML (ref 211–911)
WBC # BLD AUTO: 8.9 K/UL (ref 4.8–10.8)

## 2020-08-16 PROCEDURE — 84100 ASSAY OF PHOSPHORUS: CPT

## 2020-08-16 PROCEDURE — 700102 HCHG RX REV CODE 250 W/ 637 OVERRIDE(OP): Performed by: STUDENT IN AN ORGANIZED HEALTH CARE EDUCATION/TRAINING PROGRAM

## 2020-08-16 PROCEDURE — A9270 NON-COVERED ITEM OR SERVICE: HCPCS | Performed by: SURGERY

## 2020-08-16 PROCEDURE — 82607 VITAMIN B-12: CPT

## 2020-08-16 PROCEDURE — 84443 ASSAY THYROID STIM HORMONE: CPT

## 2020-08-16 PROCEDURE — 36415 COLL VENOUS BLD VENIPUNCTURE: CPT

## 2020-08-16 PROCEDURE — 82962 GLUCOSE BLOOD TEST: CPT | Mod: 91

## 2020-08-16 PROCEDURE — U0003 INFECTIOUS AGENT DETECTION BY NUCLEIC ACID (DNA OR RNA); SEVERE ACUTE RESPIRATORY SYNDROME CORONAVIRUS 2 (SARS-COV-2) (CORONAVIRUS DISEASE [COVID-19]), AMPLIFIED PROBE TECHNIQUE, MAKING USE OF HIGH THROUGHPUT TECHNOLOGIES AS DESCRIBED BY CMS-2020-01-R: HCPCS

## 2020-08-16 PROCEDURE — C9803 HOPD COVID-19 SPEC COLLECT: HCPCS | Performed by: INTERNAL MEDICINE

## 2020-08-16 PROCEDURE — 770020 HCHG ROOM/CARE - TELE (206)

## 2020-08-16 PROCEDURE — 700105 HCHG RX REV CODE 258: Performed by: STUDENT IN AN ORGANIZED HEALTH CARE EDUCATION/TRAINING PROGRAM

## 2020-08-16 PROCEDURE — 85027 COMPLETE CBC AUTOMATED: CPT

## 2020-08-16 PROCEDURE — 700111 HCHG RX REV CODE 636 W/ 250 OVERRIDE (IP): Performed by: STUDENT IN AN ORGANIZED HEALTH CARE EDUCATION/TRAINING PROGRAM

## 2020-08-16 PROCEDURE — 700102 HCHG RX REV CODE 250 W/ 637 OVERRIDE(OP): Performed by: SURGERY

## 2020-08-16 PROCEDURE — 80048 BASIC METABOLIC PNL TOTAL CA: CPT

## 2020-08-16 PROCEDURE — 99222 1ST HOSP IP/OBS MODERATE 55: CPT | Mod: AI,GC | Performed by: INTERNAL MEDICINE

## 2020-08-16 PROCEDURE — 83735 ASSAY OF MAGNESIUM: CPT

## 2020-08-16 PROCEDURE — A9270 NON-COVERED ITEM OR SERVICE: HCPCS | Performed by: STUDENT IN AN ORGANIZED HEALTH CARE EDUCATION/TRAINING PROGRAM

## 2020-08-16 PROCEDURE — 82746 ASSAY OF FOLIC ACID SERUM: CPT

## 2020-08-16 RX ORDER — THIAMINE MONONITRATE (VIT B1) 100 MG
100 TABLET ORAL 3 TIMES DAILY
Status: DISCONTINUED | OUTPATIENT
Start: 2020-08-16 | End: 2020-08-17 | Stop reason: HOSPADM

## 2020-08-16 RX ORDER — FOLIC ACID 1 MG/1
1 TABLET ORAL DAILY
Status: DISCONTINUED | OUTPATIENT
Start: 2020-08-16 | End: 2020-08-17 | Stop reason: HOSPADM

## 2020-08-16 RX ORDER — AMOXICILLIN AND CLAVULANATE POTASSIUM 875; 125 MG/1; MG/1
1 TABLET, FILM COATED ORAL EVERY 12 HOURS
Status: DISCONTINUED | OUTPATIENT
Start: 2020-08-16 | End: 2020-08-17 | Stop reason: HOSPADM

## 2020-08-16 RX ADMIN — SODIUM CHLORIDE, POTASSIUM CHLORIDE, SODIUM LACTATE AND CALCIUM CHLORIDE 1000 ML: 600; 310; 30; 20 INJECTION, SOLUTION INTRAVENOUS at 16:24

## 2020-08-16 RX ADMIN — ENALAPRIL MALEATE 2.5 MG: 2.5 TABLET ORAL at 06:22

## 2020-08-16 RX ADMIN — PIPERACILLIN AND TAZOBACTAM 3.38 G: 3; .375 INJECTION, POWDER, LYOPHILIZED, FOR SOLUTION INTRAVENOUS; PARENTERAL at 02:40

## 2020-08-16 RX ADMIN — ENOXAPARIN SODIUM 40 MG: 40 INJECTION SUBCUTANEOUS at 06:22

## 2020-08-16 RX ADMIN — AMOXICILLIN AND CLAVULANATE POTASSIUM 1 TABLET: 875; 125 TABLET, FILM COATED ORAL at 23:56

## 2020-08-16 RX ADMIN — SODIUM CHLORIDE, POTASSIUM CHLORIDE, SODIUM LACTATE AND CALCIUM CHLORIDE 1000 ML: 600; 310; 30; 20 INJECTION, SOLUTION INTRAVENOUS at 23:55

## 2020-08-16 RX ADMIN — VANCOMYCIN HYDROCHLORIDE 1750 MG: 500 INJECTION, POWDER, LYOPHILIZED, FOR SOLUTION INTRAVENOUS at 03:12

## 2020-08-16 RX ADMIN — Medication 100 MG: at 06:22

## 2020-08-16 RX ADMIN — SODIUM CHLORIDE, POTASSIUM CHLORIDE, SODIUM LACTATE AND CALCIUM CHLORIDE 1000 ML: 600; 310; 30; 20 INJECTION, SOLUTION INTRAVENOUS at 03:12

## 2020-08-16 RX ADMIN — Medication 100 MG: at 13:15

## 2020-08-16 RX ADMIN — THERA TABS 1 TABLET: TAB at 06:22

## 2020-08-16 RX ADMIN — ASPIRIN 81 MG: 81 TABLET, COATED ORAL at 06:22

## 2020-08-16 RX ADMIN — Medication 100 MG: at 17:18

## 2020-08-16 RX ADMIN — DOCUSATE SODIUM 50 MG AND SENNOSIDES 8.6 MG 2 TABLET: 8.6; 5 TABLET, FILM COATED ORAL at 06:22

## 2020-08-16 RX ADMIN — ATORVASTATIN CALCIUM 40 MG: 40 TABLET, FILM COATED ORAL at 06:22

## 2020-08-16 RX ADMIN — FOLIC ACID 1 MG: 1 TABLET ORAL at 06:22

## 2020-08-16 RX ADMIN — INSULIN LISPRO 1 UNITS: 100 INJECTION, SOLUTION INTRAVENOUS; SUBCUTANEOUS at 17:23

## 2020-08-16 RX ADMIN — INSULIN LISPRO 1 UNITS: 100 INJECTION, SOLUTION INTRAVENOUS; SUBCUTANEOUS at 23:58

## 2020-08-16 RX ADMIN — INSULIN LISPRO 1 UNITS: 100 INJECTION, SOLUTION INTRAVENOUS; SUBCUTANEOUS at 12:00

## 2020-08-16 ASSESSMENT — PATIENT HEALTH QUESTIONNAIRE - PHQ9
1. LITTLE INTEREST OR PLEASURE IN DOING THINGS: NOT AT ALL
SUM OF ALL RESPONSES TO PHQ9 QUESTIONS 1 AND 2: 0
2. FEELING DOWN, DEPRESSED, IRRITABLE, OR HOPELESS: NOT AT ALL
2. FEELING DOWN, DEPRESSED, IRRITABLE, OR HOPELESS: NOT AT ALL
SUM OF ALL RESPONSES TO PHQ9 QUESTIONS 1 AND 2: 0
1. LITTLE INTEREST OR PLEASURE IN DOING THINGS: NOT AT ALL

## 2020-08-16 ASSESSMENT — ENCOUNTER SYMPTOMS
FALLS: 0
VOMITING: 0
MYALGIAS: 0
HEADACHES: 0
ABDOMINAL PAIN: 0
PALPITATIONS: 0
HEARTBURN: 0
DOUBLE VISION: 0
MEMORY LOSS: 0
FLANK PAIN: 0
BLURRED VISION: 0
DEPRESSION: 0
CHILLS: 0
WHEEZING: 0
DIZZINESS: 0
FEVER: 0
NAUSEA: 0
COUGH: 0

## 2020-08-16 ASSESSMENT — COGNITIVE AND FUNCTIONAL STATUS - GENERAL
MOVING TO AND FROM BED TO CHAIR: A LITTLE
DAILY ACTIVITIY SCORE: 24
SUGGESTED CMS G CODE MODIFIER MOBILITY: CJ
MOBILITY SCORE: 20
WALKING IN HOSPITAL ROOM: A LITTLE
CLIMB 3 TO 5 STEPS WITH RAILING: A LITTLE
STANDING UP FROM CHAIR USING ARMS: A LITTLE
SUGGESTED CMS G CODE MODIFIER DAILY ACTIVITY: CH

## 2020-08-16 NOTE — PROGRESS NOTES
Trauma/Surgical Progress Note    Author: Donaldo Woodward M.D. Date & Time created: 8/16/2020   10:19 AM     Courtesy notification admission after appendectomy on 8/13  Dr Rowan with drain   Interval Events:  Berlin Pelaez Hurtado awake alert  Friendly  Eating  He states comfortable without complaint  No pain    Hemodynamics:  /90   Pulse 87   Temp 36.3 °C (97.3 °F) (Temporal)   Resp 18   Wt 65.7 kg (144 lb 13.5 oz)   SpO2 96%      Respiratory:    Respiration: 18, Pulse Oximetry: 96 %           Fluids:    Intake/Output Summary (Last 24 hours) at 8/16/2020 1019  Last data filed at 8/16/2020 0900  Gross per 24 hour   Intake 1240 ml   Output 730 ml   Net 510 ml     Admit Weight: 65.7 kg (144 lb 13.5 oz)  Current Weight: 65.7 kg (144 lb 13.5 oz)    Physical Exam  Awake alert friendly interactive   Tolerating diet   Abdomen soft nontender nondistended   Drain in place serous fluid present   medical Decision Making/Problem List:      Core Measures & Quality Metrics:  Core Measures & Quality Metrics  ADRIANA Score  Discussed patient condition with Patient and provider.  CT IMPRESSION:     1. Recent appendectomy, with mild reactive wall thickening of distal ileum. Right lower quadrant drain. No fluid collections at the drain tip.  2. Trace pneumoperitoneum, postsurgical.  3. Otherwise unremarkable abdomen/pelvis.    Assessment/Plan  Courtesy notification due to recent surgery  Recovering well from the surgery as above  Continue drain care  Abdomen soft nontender  Diet as tolerated  Follow-up primary surgical Dr. Hicks as planned  Care as directed by medicine primary    Surgery available to assist as needed please contact if needed

## 2020-08-16 NOTE — PROGRESS NOTES
2 RN skin check complete with marleni simmons .   Devices in place oxygen.  Skin assessed under devices intact.  Confirmed pressure ulcers found on n/a.  New potential pressure ulcers noted on n/a. Wound consult placed n/a.      Abdominal dressing from appendectomy, ev drain, discolored knees, scattered scabs on legs

## 2020-08-16 NOTE — ASSESSMENT & PLAN NOTE
- Patient has history of non-insulin-dependent diabetes mellitus on metformin and sulfonylureas.  - On presentation blood sugars were slightly elevated in the 200s with mild elevation in beta hydroxybutyrate and anion gap.   -  Anion gap now closed after received fluids, but discontinued. Currently with great oral intake.  - 8/17 CMP   - Continue insulin sliding scale for now  - Pending hemoglobin A1c  - Continue ACCu checks inpatient  - Patient asymptomatic, alert and oriented times 4, no fever or elevated white count  - Will be discharged home with follow up with Surgery and PCP

## 2020-08-16 NOTE — SENIOR ADMIT NOTE
Senior Admit Note                              Chief complaint: Altered mental status    Brief HPI:  Mr. Hurtado is a 64 y.o. male with a medical history significant for an appendectomy and drain placement on 8/13/2020 for a ruptured appendix, diabetes mellitus type II (on ISS since surgery and off home metformin), HTN, HLD presented to the ED with altered mental status. Per daughter at bedside, patient has been agitated and repetitive intermittently since discharge from [facility] this afternoon. Patient has a h/o daily alcohol use (maybe 4-5 beers per daughter, but unsure of exact quantity). His last drink was prior to hospitalization at [facility]. Family states he has been having fevers but not today.  In the ED, patient was tachycardic and initially hypertensive SBP 180s but in the 130s when we assessed patient. He received 1L bolus in the ED along with ativan, haldol, benedryl.    Pertinent labs: Na 141, K 3.3, bicarb 18, AG 19, glu 234, mg 2.2, BHB 2.73, WBC 9, Hgb 14.7, Plt 228. UA showed glucose and ketones but negative for infection.    Imaging:  CT head was unremarkable.    CT abd/pelvis - Recent appendectomy, with mild reactive wall thickening of distal ileum. Right lower quadrant drain. No fluid collections at the drain tip.    CXR - Hypoinflation with right lung base atelectasis.    Assessment and Plan:    ** Altered mental status - ? Infection vs delirium (hosp acquired) vs DKA vs electrolyte imbalance vs alcohol withdrawal  ** DKA  ** Hypokalemia  ** HAGMA  ** DAVID drain in place s/p appendectomy for ruptured appendix - no discharge or bleeding around drain noted  ** Type II DM  ** HTN  ** HLD  ** H/o alcohol abuse    - Admit to telemetry  - 2 L additional IVF, then maintenance  - IV vanc and zosyn; can deescalate once culture results available  - ISS, accuc checks. Hypoglycemia protocol  - Q4h BMP, mag and phos  - Thiamine, MV, folic acid  - Monitor for alcohol withdrawal; start CIWA when  appropriate  - Replace electrolytes as needed  - TSH, B12, folate, A1c levels pending  - Blood cultures pending  - Diabetic diet after bedside swallow eval  - Fall/aspiration/seizure precautions  - Lovenox for DVT prophylaxis  - No heavy lifting for 6 weeks, per discharge instructions from [facility]  - If unresolving AMS, can consider LP or MRI brain      For complete details, please refer to H&P by Dr. Corbin.    Renzo Aguilar M.D.

## 2020-08-16 NOTE — ASSESSMENT & PLAN NOTE
- Patient presented with altered mental status and confusion post laparoscopic effective appendectomy on 8/13.   -  Alert and oriented following morning   - CT abdomen and CT head within normal limits, chest x-ray reviewed no significant infiltrates noted except for right-sided atelectasis minimal.    - 8/17 alert and oriented times 4, asymptomatic, no fever, elevated white count or new complaints.            Patient tolerating well oral intake, ambulating, passing gas, voiding well, regular bowel movements.                 Plan  - Resume diet  - Encourage ambulation  - Continue Oral antibiotics  - Finish Abx regimen as recommended by Surgeon  - PCP and Surgery follow up

## 2020-08-16 NOTE — ASSESSMENT & PLAN NOTE
- Patient underwent laparoscopic appendectomy due to ruptured appendix, has a DAVID drain in place done on 8/13.  -   - 8/17 Unremarkable physical exam, afebrile, no WBC elevation  - DAVID drain output   - Continue Abx therapy complete 10 days per Surgery  - Recommend follow-up outpatient on discharge with Surgery and PCP

## 2020-08-16 NOTE — ED NOTES
Pt resting in bed talking to family at bedside. Breaths are even and unlabored and no distress is noted at this time.

## 2020-08-16 NOTE — PROGRESS NOTES
Pharmacy Kinetics   64 y.o. male on vancomycin day # 1 2020    Currently on Vancomycin 1000 mg iv q12hr  Provider specified end date: TBD     Indication for Treatment: intra-abdominal infection     Pertinent history per medical record: Admitted on 8/15/2020 for AMS, post op of ruptured s/p laparoscopic appendectomy on . Pt is afebrile, no leukocytosis. Empiric antibiotics initiated to rule out intra-abdominal infection.    Other antibiotics: zosyn 3.375g IV q8hr     Allergies: Patient has no known allergies.     List concerns for renal function: cocurrent use of zosyn    Pertinent cultures to date:   08/15  PBCx x 2 in process     Recent Labs     20  0800 20  0446 08/15/20  1920   WBC 9.4 7.2 9.0   NEUTSPOLYS 80.70* 75.90* 76.50*     Recent Labs     20  0800 20  0446 08/15/20  1920   BUN  15   CREATININE 0.61 0.69 0.71   ALBUMIN 3.5 2.7* 3.2     No results for input(s): VANCOTROUGH, VANCOPEAK, VANCORANDOM in the last 72 hours.    Intake/Output Summary (Last 24 hours) at 2020 0351  Last data filed at 8/15/2020 2200  Gross per 24 hour   Intake 1000 ml   Output --   Net 1000 ml      /79   Pulse 83   Temp 36.6 °C (97.9 °F) (Temporal)   Resp 16   Wt 65.7 kg (144 lb 13.5 oz)   SpO2 96%  Temp (24hrs), Av.4 °C (97.5 °F), Min:36.3 °C (97.3 °F), Max:36.6 °C (97.9 °F)      A/P   1. Vancomycin dose change: new start, 15 mg/kg IV q12hr x 3 days ordered  2. Next vancomycin level: not yet ordered, recommend prior to 3rd total dose  3. Goal trough: 10-15 mcg/ml  4. Comments: no major concern of renal accumulation. A trough will be obtained at steady state if vancomycin continues. Pharmacy will continue to follow, recommend de-escalation therapy if appropriate.     Tamika Womack, PharmD

## 2020-08-16 NOTE — ED PROVIDER NOTES
"                                                                          ED Provider Note    Scribed for Mikey Walker M.D. by Helena Foster. 8/15/2020  6:35 PM    CHIEF COMPLAINT  No chief complaint on file.      ROSALIA Hurtado is a 64 y.o. male who presents for changes in behavior onset earlier today. Nursing states that in triage the patient was aggressive and grabbing at nurses. His daughter is with him in the ED and stats he left hospital with his wife around 11 am and started acting off his baseline. Patient has been repeating whatever any one says to him and has not been making since. He keeps repeating \"I feel like I am dying.\"  He has been having fevers, but has not had any today. Denies pain, or hallucinations. He has a past medical history of diabetes.    Review of patients past medical charts which showed he had an appendectomy on 8/13 and was seen outpatient yesterday and he was doing well. He has a past medical history of diabetes and has not been taking metformin since surgery.    PPE Note: I personally donned full PPE for all patient encounters during this visit, including being clean-shaven with an N95 respirator mask, gloves, and goggles.     REVIEW OF SYSTEMS  Limited ROS due to acuity and underlying confusion.  Denies all complaints and often repeats questions back to interviewer    PAST MEDICAL HISTORY   has a past medical history of Corneal transplant status, Diabetes (HCC), Hyperlipidemia, and Hypertension.    SOCIAL HISTORY  Social History     Tobacco Use   • Smoking status: Never Smoker   • Smokeless tobacco: Never Used   Substance and Sexual Activity   • Alcohol use: Yes     Alcohol/week: 4.2 oz     Types: 7 Cans of beer per week   • Drug use: No   • Sexual activity: Yes       SURGICAL HISTORY   has a past surgical history that includes toe arthroplasty and lap,appendectomy (N/A, 8/13/2020).    CURRENT MEDICATIONS  Home Medications    **Home medications have not yet been " reviewed for this encounter**         ALLERGIES  No Known Allergies    PHYSICAL EXAM  VITAL SIGNS: BP (!) 181/108   Pulse (!) 122   Temp 36.3 °C (97.3 °F)   Resp (!) 22   SpO2 94%    Pulse ox interpretation: I interpret this pulse ox as normal.  Genl: M laying in bed, anxious appearing, mild distress  Head: NC/AT  ENT: Mucous membranes dry, posterior pharynx clear, uvula midline, nares patent bilaterally   Eyes: Normal sclera, pupils equal round reactive to light  Neck: Supple, FROM, no LAD appreciated   Pulmonary: Lungs are clear to auscultation bilaterally  Chest: No TTP  CV: Tachycardic, regular rhythm, no murmur appreciated, pulses 2+ in both upper and lower extremities,  Abdomen: soft, NT/ND; no rebound/guarding, midline left lower quadrant drain in place serosanguinous  : no CVA or suprapubic tenderness   Musculoskeletal: Pain free ROM of the neck. Moving upper and lower extremities and spontaneous in coordinated fashion  Neuro: Alert not oriented, perseveration, aggressive, No cerebellar signs, Sensation is grossly intact in the distal upper and lower extremities  5/5 strength in  and dorsiflexion/plantar flexion of the ankles  Psych: Patient has an appropriate affect and behavior  Skin: No rash or lesions.  No pallor or jaundice.  No cyanosis.  Warm and dry.      DIAGNOSTIC STUDIES / PROCEDURES    LABS  Labs Reviewed   CBC WITH DIFFERENTIAL - Abnormal; Notable for the following components:       Result Value    MCHC 33.6 (*)     Neutrophils-Polys 76.50 (*)     Lymphocytes 8.70 (*)     Lymphs (Absolute) 0.78 (*)     Monos (Absolute) 1.10 (*)     All other components within normal limits   COMP METABOLIC PANEL - Abnormal; Notable for the following components:    Potassium 3.3 (*)     Co2 18 (*)     Anion Gap 19.0 (*)     Glucose 234 (*)     Alkaline Phosphatase 101 (*)     All other components within normal limits   URINALYSIS,CULTURE IF INDICATED - Abnormal; Notable for the following components:     Glucose >=1000 (*)     Ketones 80 (*)     Protein 30 (*)     Occult Blood Trace (*)     All other components within normal limits   BETA-HYDROXYBUTYRIC ACID - Abnormal; Notable for the following components:    beta-Hydroxybutyric Acid 2.73 (*)     All other components within normal limits   OSMOLALITY SERUM - Abnormal; Notable for the following components:    Osmolality Serum 299 (*)     All other components within normal limits   VENOUS BLOOD GAS - Abnormal; Notable for the following components:    Venous Bg Po2 80.0 (*)     Venous Bg Hco3 22 (*)     All other components within normal limits   URINE MICROSCOPIC (W/UA) - Abnormal; Notable for the following components:    WBC 0-2 (*)     RBC 0-2 (*)     All other components within normal limits   ACCU-CHEK GLUCOSE - Abnormal; Notable for the following components:    Glucose - Accu-Ck 206 (*)     All other components within normal limits   MAGNESIUM   URINE DRUG SCREEN   DIAGNOSTIC ALCOHOL   BLOOD CULTURE,HOLD   BLOOD CULTURE,HOLD   DIFFERENTIAL MANUAL   PERIPHERAL SMEAR REVIEW   PLATELET ESTIMATE   MORPHOLOGY   ESTIMATED GFR       RADIOLOGY  CT-ABDOMEN-PELVIS WITH   Final Result      1. Recent appendectomy, with mild reactive wall thickening of distal ileum. Right lower quadrant drain. No fluid collections at the drain tip.   2. Trace pneumoperitoneum, postsurgical.   3. Otherwise unremarkable abdomen/pelvis.      CT-HEAD W/O   Final Result      1. No CT evidence of acute infarct, hemorrhage or mass.   2. Mild global parenchymal atrophy. Chronic small vessel ischemic changes.      DX-CHEST-PORTABLE (1 VIEW)   Final Result      Hypoinflation with RIGHT lung base atelectasis.          COURSE & MEDICAL DECISION MAKING  Pertinent Labs & Imaging studies reviewed. (See chart for details)    Differential diagnoses include but not limited to: Hyperglycemia, DKA, encephalopathy, press, intracranial hemorrhage unlikely, metabolic derangement, intraabdominal infection, sepsis,  abscess unlikely, UTI.    Medical Decision Making:   Patient presents emergency room for symptoms as described above.  The patient's initial clinical picture is difficult secondary to the perceived confusion, language barrier and his recent surgical procedures.  On initial assessment he has no focal neurological deficits, does not appear to be encephalopathic but has either underlying delirium, psychosis or possible metabolic derangement.  Abdomen is soft, nontender and there is no evidence of warmth or erythema at the site of drain.  Initial sedation using Ativan, Haldol, Benadryl was required to simply get IV access.  He did not appear on the monitor having QTC prolongation.     Following initial medical work-up, the patient had evidence of anion gap, hyperglycemia, elevated blood glucose urinalysis showed ketonuria.  There is a delay in getting the venous blood gas secondary to difficult blood draws and this showed minor acidosis.  Alcohol level is not elevated, there is no positive urine drug screens.  No leukocytosis or leftward shift.  Abdominal CT shows no evidence of acute abscess, no evolving process beyond postsurgical findings and changes.  Repeat abdominal exam showed no progression to peritonitis.  CT of the head is without any evidence of mass, bleeds.  Overall I believe the acute delirium is secondary to his metabolic change and acute DKA.  With relative acidosis of 7.31, I do not believe the patient needs ICU status at this time.  He has been aggressively fluid resuscitated after CMP showed borderline low potassium.  Potassium-based fluids were also administered and IV insulin given.  Case is discussed with the hospitalist will evaluate the patient for admission    TIMELINE  6:35 PM - Patient seen and examined at bedside. I informed the patient and her daughter that I am concerned about his presentation and he will likely need to be admitted to the hospital for further treatment.  I discussed that we  will obtain labs and imaging to further evaluate for a cause of his symptoms. Patient will be treated with Ativan 0.5 mg, Haldol 5 mg, Benadryl 50 mg, lorazepam 2 mg/mL and IV fluids or presumed sepsis and tachycardia. Ordered CT abdomen, CT head, and chest x-ray, magnesium, beta-hydroxybutyric acid, osmolality serum, venous blood gas, diagnostic alcohol, urine drug screen, CBC w/ diff, CMP, UA, and accu-chek glucose to evaluate his symptoms.     8:29 PM - Patient was reevaluated at bedside. Discussed lab and radiology results with the patient his daughter and informed them that the patient is in DKA. I informed them that it is unlikely a stroke or post op complications. We are waiting for one more lab to return and then we will work on getting the patient admitted to the hospital for further treatment and observation.    8:53 PM - Patient will need to be admitted to hospital. Crista hospitalist and surgeon.    8:59 PM - I discussed the patient's case and the above findings with Dr. Woodward (surgery) who appreciates the update. No acute surgical intervention needed at this time. Crista hospitalist.    9:26 - I discussed the patient's case and the above findings with Internal medicine who agreed to evaluate patient for hospitalization. Patients care will be transferred at this time.     CRITICAL CARE  The very real possibility of a deterioration of this patient's condition required the highest level of my preparedness for sudden, emergent intervention.  I provided critical care services, which included medication orders, frequent reevaluations of the patient's condition and response to treatment, ordering and reviewing test results, and discussing the case with various consultants.  The critical care time associated with the care of the patient was 45 minutes. Review chart for interventions. This time is exclusive of any other billable procedures.     HYDRATION: Based on the patient's presentation of Presumed Sepsis and  Tachycardia the patient was given IV fluids. IV Hydration was used because oral hydration was not adequate alone. Upon recheck following hydration, the patient was well improved.     DISPOSITION:  Patient will be hospitalized by Internal medicine in guarded condition.    FINAL IMPRESSION  Visit Diagnoses     ICD-10-CM   1. Altered mental status, unspecified altered mental status type  R41.82   2. Tachycardia  R00.0   3. Diabetic ketoacidosis without coma associated with type 2 diabetes mellitus (HCC)  E11.10        Helena JOVEL (Fabien), am scribing for, and in the presence of, Mikey Walker M.D..    Electronically signed by: Helena Foster (Fabien), 8/15/2020    Mikey JOVEL M.D. personally performed the services described in this documentation, as scribed by Helena Foster in my presence, and it is both accurate and complete. C    The note accurately reflects work and decisions made by me.  Mikey Walker M.D.  8/16/2020  2:47 AM

## 2020-08-16 NOTE — ED NOTES
Pharmacy Medication Reconciliation      Medication reconciliation updated and complete per pt home pharmacy  Allergies have been verified with pt home pharmacy  No oral ABX within the last 14 days  Pt home pharmacy:[facility]

## 2020-08-16 NOTE — ED NOTES
Called Tele 7 to let RN know that pt is ready to be picked up. Pt's daughter is also leaving at this time. Will continue to monitor pt while awaiting RN arrival for transport.

## 2020-08-16 NOTE — CARE PLAN
Problem: Venous Thromboembolism (VTW)/Deep Vein Thrombosis (DVT) Prevention:  Goal: Patient will participate in Venous Thrombosis (VTE)/Deep Vein Thrombosis (DVT)Prevention Measures  Flowsheets (Taken 8/16/2020 0823)  Pharmacologic Prophylaxis Used: LMWH: Enoxaparin(Lovenox)     Problem: Communication  Goal: The ability to communicate needs accurately and effectively will improve  Intervention: Educate patient and significant other/support system about the plan of care, procedures, treatments, medications and allow for questions  Flowsheets (Taken 8/16/2020 0823)  Pt & Family Have Been Educated on Methods Available to Report Concerns Related to Care, Treatment, Services, and Patient Safety Issues: Yes

## 2020-08-16 NOTE — PROGRESS NOTES
Daily Progress Note:     Date of Service: 8/16/2020  Primary Team: [employer] [facility] Blue Team   Attending: Paul Arellano M.D.   Senior Resident: Dr. Bolaños  Intern: Dr. Bowman  Contact:  [phone]    Chief Complaint:   Altered mental status     Subjective  -No acute events overnight, vitals stable  -Patient fully alert and oriented this morning giving good history.  States slightly confused yesterday but did not want to come into the hospital however his daughter was concerned and brought him in.  States he is totally fine this morning as long as his imaging and labs look okay he would be happy to go home.  -Denies any significant abdominal pain, minimal discomfort around the surgical site.  Drain less than 5 cc noted this morning.  Afebrile with no evidence of leukocytosis.  Otherwise stable  -We will increase ambulation today, resume diet and monitor.  Potential discharge tomorrow if he remains stable    Consultants/Specialty:  None    Review of Systems:    Review of Systems   Constitutional: Negative for chills and fever.   HENT: Negative for hearing loss and tinnitus.    Eyes: Negative for blurred vision and double vision.   Respiratory: Negative for cough and wheezing.    Cardiovascular: Negative for chest pain and palpitations.   Gastrointestinal: Negative for abdominal pain, heartburn, nausea and vomiting.   Genitourinary: Negative for dysuria and flank pain.   Musculoskeletal: Negative for falls and myalgias.   Skin: Negative for itching and rash.   Neurological: Negative for dizziness and headaches.   Psychiatric/Behavioral: Negative for depression and memory loss.       Objective Data:   Physical Exam:   Vitals:   Temp:  [36.3 °C (97.3 °F)-36.6 °C (97.9 °F)] 36.3 °C (97.3 °F)  Pulse:  [] 87  Resp:  [16-22] 18  BP: (110-181)/() 142/90  SpO2:  [87 %-100 %] 96 %    Physical Exam  Constitutional:       Appearance: Normal appearance.   HENT:      Head: Normocephalic and atraumatic.      Nose:  Nose normal. No congestion.      Mouth/Throat:      Mouth: Mucous membranes are moist.      Pharynx: No oropharyngeal exudate.   Eyes:      Extraocular Movements: Extraocular movements intact.      Pupils: Pupils are equal, round, and reactive to light.   Neck:      Musculoskeletal: No neck rigidity.   Cardiovascular:      Rate and Rhythm: Normal rate and regular rhythm.      Heart sounds: Normal heart sounds. No murmur.   Pulmonary:      Effort: No respiratory distress.      Breath sounds: Normal breath sounds.   Abdominal:      General: There is no distension.      Palpations: Abdomen is soft.      Comments: DAVID drain in place   Musculoskeletal: Normal range of motion.         General: No deformity.   Skin:     General: Skin is warm.      Capillary Refill: Capillary refill takes less than 2 seconds.      Findings: No bruising.   Neurological:      General: No focal deficit present.      Mental Status: He is alert and oriented to person, place, and time.   Psychiatric:         Mood and Affect: Mood normal.         Judgment: Judgment normal.           Labs:   Results for LILIA MEJIA (MRN [id number]) as of 8/16/2020 10:45   Ref. Range 8/16/2020 08:25   WBC Latest Ref Range: 4.8 - 10.8 K/uL 8.9   RBC Latest Ref Range: 4.70 - 6.10 M/uL 4.64 (L)   Hemoglobin Latest Ref Range: 14.0 - 18.0 g/dL 14.5   Hematocrit Latest Ref Range: 42.0 - 52.0 % 42.1   MCV Latest Ref Range: 81.4 - 97.8 fL 90.7   MCH Latest Ref Range: 27.0 - 33.0 pg 31.3   MCHC Latest Ref Range: 33.7 - 35.3 g/dL 34.4   RDW Latest Ref Range: 35.9 - 50.0 fL 42.7   Platelet Count Latest Ref Range: 164 - 446 K/uL 211   MPV Latest Ref Range: 9.0 - 12.9 fL 9.7   Sodium Latest Ref Range: 135 - 145 mmol/L 145   Potassium Latest Ref Range: 3.6 - 5.5 mmol/L 4.3   Chloride Latest Ref Range: 96 - 112 mmol/L 113 (H)   Co2 Latest Ref Range: 20 - 33 mmol/L 16 (L)   Anion Gap Latest Ref Range: 7.0 - 16.0  16.0   Glucose Latest Ref Range: 65 - 99 mg/dL 128 (H)   Bun  Latest Ref Range: 8 - 22 mg/dL 9   Creatinine Latest Ref Range: 0.50 - 1.40 mg/dL 0.48 (L)   GFR If  Latest Ref Range: >60 mL/min/1.73 m 2 >60   GFR If Non  Latest Ref Range: >60 mL/min/1.73 m 2 >60   Calcium Latest Ref Range: 8.5 - 10.5 mg/dL 8.5   Phosphorus Latest Ref Range: 2.5 - 4.5 mg/dL 2.9   Magnesium Latest Ref Range: 1.5 - 2.5 mg/dL 2.1   Folic acid 26.9, vitamin B12 833, TSH 2.2  Imaging:   CT-ABDOMEN-PELVIS WITH   Final Result      1. Recent appendectomy, with mild reactive wall thickening of distal ileum. Right lower quadrant drain. No fluid collections at the drain tip.   2. Trace pneumoperitoneum, postsurgical.   3. Otherwise unremarkable abdomen/pelvis.      CT-HEAD W/O   Final Result      1. No CT evidence of acute infarct, hemorrhage or mass.   2. Mild global parenchymal atrophy. Chronic small vessel ischemic changes.      DX-CHEST-PORTABLE (1 VIEW)   Final Result      Hypoinflation with RIGHT lung base atelectasis.        Problem Representation: 64-year-old male status post DAVID drain placement after laparoscopic appendectomy presented with altered mental status.  Currently alert oriented x3 no new complaints.    * Altered mental status, unspecified  Assessment & Plan  Patient presented with altered mental status and confusion post laparoscopic effective appendectomy on 8/13.  However appears to be much alert and oriented this morning able to give a good history.  Endorses that he was slightly confused yesterday, unknown reason but feels much better today.-Possible hospital-acquired delirium previously which is now resolved.  No source of infection identified.  White count stable and he remains afebrile.  CT abdomen and CT head within normal limits, chest x-ray reviewed no significant infiltrates noted except for right-sided atelectasis minimal.  No symptoms    Plan  -Resume diet  -Encourage ambulation  -Discontinue antibiotics,.  Abdominal is nontender, afebrile with  no leukocytosis.  -Patient remained stable, will anticipate potential discharge tomorrow      Alcohol use  Assessment & Plan  Patient has history of alcohol use drinks about 4-5 beers a day, last drink on 8/12.  No signs of withdrawal.  -Continue to monitor  -Continue thiamine, folic acid and multivitamins.    S/P laparoscopic appendectomy  Assessment & Plan  Patient underwent laparoscopic appendectomy due to ruptured appendix, has a DAVID drain in place done on 8/13.  Abdomen nontender, nondistended drain output about 5 cc noted this morning.  -He stable, no acute surgical needs  -Recommend follow-up outpatient on discharge      Type 2 diabetes mellitus (HCC)  Assessment & Plan  Patient has history of non-insulin-dependent diabetes mellitus on metformin and sulfonylureas.  -On presentation blood sugars were slightly elevated in the 200s with mild elevation in beta hydroxybutyrate and anion gap.  Anion gap now closed after receiving IV fluids.  -Continue insulin sliding scale for now  -Pending hemoglobin A1c  -Continue hypoglycemia protocol and Accu-Cheks  -Follow-up with primary care provider on discharge    Essential hypertension- (present on admission)  Assessment & Plan  Blood pressure stable   - continue home medications

## 2020-08-16 NOTE — ASSESSMENT & PLAN NOTE
- Patient has history of alcohol use drinks about 4-5 beers a day, last drink on 8/12.    - No signs of withdrawal.  - Patient asymptomatic.  - May continue multivitamins outpatient  - ETOH cessation

## 2020-08-16 NOTE — PROGRESS NOTES
Report received. Patient oriented x2 disoriented to event, and time. Patient able to state he is in domitila but not in the hospital.. . Pain level 0 out of 10. Denies SOB. ev drain in place. Fall risk interventions in place, bed in low position, and bed alarm on. Assessment completed.

## 2020-08-16 NOTE — H&P
History & Physical Note    Date of Admission: 8/15/2020  Admission Status: Inpatient  [facility] Team: [facility]  Attending: Paul Arellano M.D.   Senior Resident: Dr. Bolaños  Intern: Dr. Bowman  Contact Number: [phone]    Chief Complaint: Altered mental status     History of Present Illness (HPI):   64M PMH HTN and poorly controlled DM presenting for altered mental status. Of note the patient is POD2 for laparoscopic appendectomy and drain placement for ruptured appendicitis. Most of the history is obtained from the patient's daughter at bedside, as patient is intermittently agitated and somnolent and falls asleep repeatedly during the exam. Per her report, the patient was discharged from [facility] today at noon and nearly immediately became agitated and repetitive. He remained AOx3, however intermittently became unfocused and gave inappropriate answers.  He does not have a history of similar behavior in the past, illicit drug use, recent changes to medications, stroke, or head injury. He is a daily drinker of 4-5 12oz beers. Prior to his operation, he had 3 days of fever, poor PO intake, RLQ pain, and felt generally unwell.    In the ED, the patient was afebrile, tachycardic to the 120s, and hypertensive to SBP 180s. Significant negative studies are negative EtOH/drug screen, normal pH, normal CBC and head CT. Significant abnormal labs are K 3.3, glucose 234, BHB 2.73, UA positive for glucose and ketones. He was given 1L NS, and is admitted for further diagnostic workup.     Review of Systems:   Limited due to patient's mental status    Review of Systems   Eyes: Negative for blurred vision.   Respiratory: Negative for shortness of breath.    Cardiovascular: Negative for chest pain.       Past Medical History:   Past Medical History was reviewed with patient.   has a past medical history of Corneal transplant status, Diabetes (HCC), Hyperlipidemia, and Hypertension.    Past Surgical  History: Past Surgical History was not reviewed with patient.   has a past surgical history that includes toe arthroplasty and pr lap,appendectomy (N/A, 8/13/2020).    Medications: Medications have been reviewed with patient.  Prior to Admission Medications   Prescriptions Last Dose Informant Patient Reported? Taking?   aspirin 81 MG EC tablet  Rx Bottle (For Med Information) No No   Sig: TAKE 1 TABLET BY MOUTH EVERY DAY   atorvastatin (LIPITOR) 40 MG Tab  Rx Bottle (For Med Information) No No   Sig: TAKE 1 TAB BY MOUTH EVERY DAY. TO REPLACE SIMVASTATIN   enalapril (VASOTEC) 2.5 MG Tab  Rx Bottle (For Med Information) No No   Sig: Take 1 Tab by mouth every day.   glimepiride (AMARYL) 4 MG Tab  Rx Bottle (For Med Information) No No   Sig: TAKE 1 TABLET BY MOUTH TWICE A DAY   metformin (GLUCOPHAGE) 1000 MG tablet  Rx Bottle (For Med Information) No No   Sig: TAKE 1 TABLET BY MOUTH 2 TIMES DAILY FOR DIABETES   multivitamin (THERAGRAN) Tab  Rx Bottle (For Med Information) Yes No   Sig: Take 1 Tab by mouth every day.   oxyCODONE immediate-release (ROXICODONE) 5 MG Tab   No No   Sig: Take 1 Tab by mouth every four hours as needed for up to 7 days.      Facility-Administered Medications: None        Allergies: Allergies have been reviewed with patient.  No Known Allergies    Family History:   family history includes Alcohol/Drug in his father; Diabetes in his father and sister; No Known Problems in his sister.     Social History: (Obtained from daughter)  Tobacco: Denies  Alcohol: 5 beers daily  Recreational drugs (illegal and prescription):  Denies   Living situation:  Wife wife  Primary Care Provider: reviewed Daja Blevins M.D.    Physical Exam: Vitals:  Temp:  [36.3 °C (97.3 °F)] 36.3 °C (97.3 °F)  Pulse:  [] 89  Resp:  [16-22] 16  BP: (110-181)/() 138/64  SpO2:  [88 %-100 %] 93 %    Physical Exam  Vitals signs and nursing note reviewed.   Constitutional:       Comments: Sleeping, snoring,  intermittently agitated and pulling at lines. Rousable, but for less than 1 minute at a time.    HENT:      Head: Normocephalic and atraumatic.      Right Ear: External ear normal.      Left Ear: External ear normal.      Nose: Nose normal.      Mouth/Throat:      Mouth: Mucous membranes are dry.   Eyes:      General: No scleral icterus.     Pupils: Pupils are equal, round, and reactive to light.   Cardiovascular:      Rate and Rhythm: Normal rate and regular rhythm.      Pulses: Normal pulses.      Heart sounds: No murmur.   Pulmonary:      Effort: Pulmonary effort is normal.      Breath sounds: Normal breath sounds.   Abdominal:      Tenderness: There is no abdominal tenderness. There is no guarding.      Comments: Protuberant abdomen. Surgical bandages to lower abdomen are dry and intact, will minimal seepage. DAVID drain present to RLQ draining 20 cc blood tinged serous fluid. Slight sediment present. Tubing strippable.    Musculoskeletal:      Right lower leg: No edema.      Left lower leg: No edema.      Comments: Well- healed scar to dorsum of L foot   Skin:     General: Skin is warm and dry.      Capillary Refill: Capillary refill takes less than 2 seconds.   Neurological:      Mental Status: He is disoriented.      Cranial Nerves: No cranial nerve deficit.      Sensory: No sensory deficit.      Motor: No weakness.     Labs:     Lab Results   Component Value Date/Time    WBC 9.0 08/15/2020 07:20 PM    RBC 4.87 08/15/2020 07:20 PM    HEMOGLOBIN 14.7 08/15/2020 07:20 PM    HEMATOCRIT 43.7 08/15/2020 07:20 PM    MCV 89.7 08/15/2020 07:20 PM    MCH 30.2 08/15/2020 07:20 PM    MCHC 33.6 (L) 08/15/2020 07:20 PM    MPV 9.6 08/15/2020 07:20 PM    NEUTSPOLYS 76.50 (H) 08/15/2020 07:20 PM    LYMPHOCYTES 8.70 (L) 08/15/2020 07:20 PM    MONOCYTES 12.20 08/15/2020 07:20 PM    EOSINOPHILS 1.70 08/15/2020 07:20 PM    BASOPHILS 0.90 08/15/2020 07:20 PM        Lab Results   Component Value Date/Time    SODIUM 141 08/15/2020  07:20 PM    POTASSIUM 3.3 (L) 08/15/2020 07:20 PM    CHLORIDE 104 08/15/2020 07:20 PM    CO2 18 (L) 08/15/2020 07:20 PM    GLUCOSE 234 (H) 08/15/2020 07:20 PM    BUN 15 08/15/2020 07:20 PM    CREATININE 0.71 08/15/2020 07:20 PM        Imaging:   CT-ABDOMEN-PELVIS WITH   Final Result      1. Recent appendectomy, with mild reactive wall thickening of distal ileum. Right lower quadrant drain. No fluid collections at the drain tip.   2. Trace pneumoperitoneum, postsurgical.   3. Otherwise unremarkable abdomen/pelvis.      CT-HEAD W/O   Final Result      1. No CT evidence of acute infarct, hemorrhage or mass.   2. Mild global parenchymal atrophy. Chronic small vessel ischemic changes.      DX-CHEST-PORTABLE (1 VIEW)   Final Result      Hypoinflation with RIGHT lung base atelectasis.          Previous Data Review: reviewed    Assessment and plan:  64M with DM and HTN, daily EtOH use, POD2 for lap appy for ruptured appendicitis presenting with 5 hours disorientation and confusion without other focal neurologic signs.     #  Altered mental status   - Differential to include medication/anesthesia effect, acute stroke, delirium, alcohol withdrawal, DKA, sepsis, encephalopathy.   - Intra-abdominal infection must be suspected due to appendiceal rupture  - Head CT negative, and no localizing symptoms  - Due to recent anesthesia event and hospitalization, hospital-delirium considered    Plan:  - Admit to telemetry  - Treat possible underlying causes with medication vacation, blood sugar control  - Keep NPO for now    # Diabetes, non-insulin dependent  - Patient has history of poor control with A1c as high as 11  - Unclear medication compliance, however patient was off his metformin and glimepiride perioperatively   - Concern in ED for DKA due to mild anion gap, ketonuria and elevated BHB, however appears to be secondary to dehydration and poor PO intake    Plan:  - Hold home medications  - ISS, hypoglycemia protocol  - 2L bolus  now, followed by maintenance at 125mL/hr  - Patient would benefit from diabetes education prior to discharge  - Repeat labs after adequate fluid resuscitation    # Appendicitis, ruptured s/p laparoscopic appendectomy  - Performed 8/13  - DAVID drain in place with non-purulent appearing drainage  - Patient did not present with sepsis   - No concerning physical exam findings  - CT negative for abdominal abscess    Plan:  - Continue to monitor closely  - Contact Surgeon to notify of ED visit and to determine plan for drain  - Colon cancer screening should be considered   - Follow up pathology    # Alcohol abuse, chronic  - Consumes 5 beers daily, more on weekends  - Patient does not appear to be actively withdrawing  - No history of seizures, DTs per daughter    Plan:  - Will not start CIWA due to AMS, however low threshold to order if patient shows sign of withdrawal  - Social work consult  -  patient when sensorium clears  - Start thiamine, folate, MVI when passes swallow eval    # Hypertension  - Currently controlled    Plan:  - Restart home medications

## 2020-08-16 NOTE — PROGRESS NOTES
Bedside report received from night nurse. Assumed care of patient. Pt. resting comfortably without any sign of distress. A&Ox4, VSS, no complaints at this time. POC reviewed and white board updated, Tele box on, Call light in reach, Bed locked in lowest position.

## 2020-08-16 NOTE — CARE PLAN
Problem: Communication  Goal: The ability to communicate needs accurately and effectively will improve  Outcome: PROGRESSING AS EXPECTED  Educated pt via  to express any needs including pain during the shift. Pt verbalized understanding.      Problem: Safety  Goal: Will remain free from injury  Outcome: PROGRESSING AS EXPECTED   Educated pt via  to not get out of bed without staff present. Pt verbalized understanding.

## 2020-08-16 NOTE — ED NOTES
Received report from Elke HAYS RN. Upon shift change pt is resting in bed with even and unlabored breaths, in no apparent distress. Pt has calmed down now that he has been medicated and daughter is outside room. Will start IV now that pt is calmed down and will send blood to lab. Will continue to monitor.

## 2020-08-16 NOTE — ED TRIAGE NOTES
"Pt yelling in Malay but reports that he is not crazy in english, pt daughter brought pt, continually yelling, attempting to use , pt reported to  that he felt like he was dying,  Pt , pt unsteady on feet,     Pt had appy 2 days ago at [facility], pt has been acting strange since he was released, repeating himself, and just \"shutting down\"    Pt has been off of metformin since surgery.      Charge informed of pt, pt to red 2.   "

## 2020-08-16 NOTE — ED NOTES
Bedside report given to CHEKO French. Pt being taken upstairs at this time by RN via sidney on cardiac monitor and O2. Pt is sleeping with even and unlabored breaths, in no apparent distress at time of transfer. Pt's paperwork and belongings sent upstairs with pt and RN.

## 2020-08-17 ENCOUNTER — NURSE TRIAGE (OUTPATIENT)
Dept: HEALTH INFORMATION MANAGEMENT | Facility: OTHER | Age: 65
End: 2020-08-17

## 2020-08-17 ENCOUNTER — APPOINTMENT (OUTPATIENT)
Dept: RADIOLOGY | Facility: MEDICAL CENTER | Age: 65
End: 2020-08-17
Attending: EMERGENCY MEDICINE
Payer: MEDICARE

## 2020-08-17 ENCOUNTER — PHARMACY VISIT (OUTPATIENT)
Dept: PHARMACY | Facility: MEDICAL CENTER | Age: 65
End: 2020-08-17
Payer: COMMERCIAL

## 2020-08-17 ENCOUNTER — HOSPITAL ENCOUNTER (OUTPATIENT)
Facility: MEDICAL CENTER | Age: 65
End: 2020-08-19
Attending: EMERGENCY MEDICINE | Admitting: INTERNAL MEDICINE
Payer: MEDICARE

## 2020-08-17 VITALS
SYSTOLIC BLOOD PRESSURE: 160 MMHG | HEART RATE: 79 BPM | BODY MASS INDEX: 25.66 KG/M2 | TEMPERATURE: 97 F | RESPIRATION RATE: 19 BRPM | WEIGHT: 144.84 LBS | DIASTOLIC BLOOD PRESSURE: 97 MMHG | OXYGEN SATURATION: 93 %

## 2020-08-17 PROBLEM — R41.82 ALTERED MENTAL STATUS, UNSPECIFIED: Status: RESOLVED | Noted: 2020-08-16 | Resolved: 2020-08-17

## 2020-08-17 PROBLEM — R74.8 ELEVATED LIVER ENZYMES: Status: ACTIVE | Noted: 2020-08-17

## 2020-08-17 LAB
ALBUMIN SERPL BCP-MCNC: 3.2 G/DL (ref 3.2–4.9)
ALBUMIN SERPL BCP-MCNC: 3.6 G/DL (ref 3.2–4.9)
ALBUMIN/GLOB SERPL: 1 G/DL
ALBUMIN/GLOB SERPL: 1.2 G/DL
ALP SERPL-CCNC: 96 U/L (ref 30–99)
ALP SERPL-CCNC: 97 U/L (ref 30–99)
ALT SERPL-CCNC: 54 U/L (ref 2–50)
ALT SERPL-CCNC: 59 U/L (ref 2–50)
ANION GAP SERPL CALC-SCNC: 15 MMOL/L (ref 7–16)
ANION GAP SERPL CALC-SCNC: 19 MMOL/L (ref 7–16)
ANISOCYTOSIS BLD QL SMEAR: ABNORMAL
AST SERPL-CCNC: 42 U/L (ref 12–45)
AST SERPL-CCNC: 49 U/L (ref 12–45)
BACTERIA WND AEROBE CULT: ABNORMAL
BASOPHILS # BLD AUTO: 0 % (ref 0–1.8)
BASOPHILS # BLD AUTO: 1.7 % (ref 0–1.8)
BASOPHILS # BLD: 0 K/UL (ref 0–0.12)
BASOPHILS # BLD: 0.15 K/UL (ref 0–0.12)
BILIRUB SERPL-MCNC: 0.5 MG/DL (ref 0.1–1.5)
BILIRUB SERPL-MCNC: 0.6 MG/DL (ref 0.1–1.5)
BUN SERPL-MCNC: 10 MG/DL (ref 8–22)
BUN SERPL-MCNC: 9 MG/DL (ref 8–22)
CALCIUM SERPL-MCNC: 9 MG/DL (ref 8.5–10.5)
CALCIUM SERPL-MCNC: 9 MG/DL (ref 8.5–10.5)
CHLORIDE SERPL-SCNC: 102 MMOL/L (ref 96–112)
CHLORIDE SERPL-SCNC: 98 MMOL/L (ref 96–112)
CO2 SERPL-SCNC: 20 MMOL/L (ref 20–33)
CO2 SERPL-SCNC: 20 MMOL/L (ref 20–33)
CREAT SERPL-MCNC: 0.49 MG/DL (ref 0.5–1.4)
CREAT SERPL-MCNC: 0.59 MG/DL (ref 0.5–1.4)
EOSINOPHIL # BLD AUTO: 0.3 K/UL (ref 0–0.51)
EOSINOPHIL # BLD AUTO: 0.32 K/UL (ref 0–0.51)
EOSINOPHIL NFR BLD: 3.5 % (ref 0–6.9)
EOSINOPHIL NFR BLD: 3.5 % (ref 0–6.9)
ERYTHROCYTE [DISTWIDTH] IN BLOOD BY AUTOMATED COUNT: 40.4 FL (ref 35.9–50)
ERYTHROCYTE [DISTWIDTH] IN BLOOD BY AUTOMATED COUNT: 41 FL (ref 35.9–50)
EST. AVERAGE GLUCOSE BLD GHB EST-MCNC: 212 MG/DL
GLOBULIN SER CALC-MCNC: 3 G/DL (ref 1.9–3.5)
GLOBULIN SER CALC-MCNC: 3.2 G/DL (ref 1.9–3.5)
GLUCOSE BLD-MCNC: 108 MG/DL (ref 65–99)
GLUCOSE BLD-MCNC: 162 MG/DL (ref 65–99)
GLUCOSE SERPL-MCNC: 145 MG/DL (ref 65–99)
GLUCOSE SERPL-MCNC: 218 MG/DL (ref 65–99)
GRAM STN SPEC: ABNORMAL
HBA1C MFR BLD: 9 % (ref 0–5.6)
HCT VFR BLD AUTO: 41.6 % (ref 42–52)
HCT VFR BLD AUTO: 44.8 % (ref 42–52)
HGB BLD-MCNC: 14.3 G/DL (ref 14–18)
HGB BLD-MCNC: 15.3 G/DL (ref 14–18)
LACTATE BLD-SCNC: 1.5 MMOL/L (ref 0.5–2)
LYMPHOCYTES # BLD AUTO: 0.52 K/UL (ref 1–4.8)
LYMPHOCYTES # BLD AUTO: 1.54 K/UL (ref 1–4.8)
LYMPHOCYTES NFR BLD: 16.7 % (ref 22–41)
LYMPHOCYTES NFR BLD: 6.1 % (ref 22–41)
MACROCYTES BLD QL SMEAR: ABNORMAL
MANUAL DIFF BLD: NORMAL
MANUAL DIFF BLD: NORMAL
MCH RBC QN AUTO: 30.4 PG (ref 27–33)
MCH RBC QN AUTO: 30.6 PG (ref 27–33)
MCHC RBC AUTO-ENTMCNC: 34.2 G/DL (ref 33.7–35.3)
MCHC RBC AUTO-ENTMCNC: 34.4 G/DL (ref 33.7–35.3)
MCV RBC AUTO: 88.9 FL (ref 81.4–97.8)
MCV RBC AUTO: 89.1 FL (ref 81.4–97.8)
METAMYELOCYTES NFR BLD MANUAL: 0.9 %
MICROCYTES BLD QL SMEAR: ABNORMAL
MONOCYTES # BLD AUTO: 0.22 K/UL (ref 0–0.85)
MONOCYTES # BLD AUTO: 0.64 K/UL (ref 0–0.85)
MONOCYTES NFR BLD AUTO: 2.6 % (ref 0–13.4)
MONOCYTES NFR BLD AUTO: 7 % (ref 0–13.4)
MORPHOLOGY BLD-IMP: NORMAL
MORPHOLOGY BLD-IMP: NORMAL
MYELOCYTES NFR BLD MANUAL: 0.9 %
MYELOCYTES NFR BLD MANUAL: 3.5 %
NEUTROPHILS # BLD AUTO: 6.62 K/UL (ref 1.82–7.42)
NEUTROPHILS # BLD AUTO: 7.03 K/UL (ref 1.82–7.42)
NEUTROPHILS NFR BLD: 70.2 % (ref 44–72)
NEUTROPHILS NFR BLD: 81.7 % (ref 44–72)
NEUTS BAND NFR BLD MANUAL: 1.8 % (ref 0–10)
NRBC # BLD AUTO: 0 K/UL
NRBC # BLD AUTO: 0 K/UL
NRBC BLD-RTO: 0 /100 WBC
NRBC BLD-RTO: 0 /100 WBC
PLATELET # BLD AUTO: 281 K/UL (ref 164–446)
PLATELET # BLD AUTO: 312 K/UL (ref 164–446)
PLATELET BLD QL SMEAR: NORMAL
PLATELET BLD QL SMEAR: NORMAL
PMV BLD AUTO: 9.2 FL (ref 9–12.9)
PMV BLD AUTO: 9.5 FL (ref 9–12.9)
POLYCHROMASIA BLD QL SMEAR: NORMAL
POTASSIUM SERPL-SCNC: 2.9 MMOL/L (ref 3.6–5.5)
POTASSIUM SERPL-SCNC: 3.6 MMOL/L (ref 3.6–5.5)
PROT SERPL-MCNC: 6.4 G/DL (ref 6–8.2)
PROT SERPL-MCNC: 6.6 G/DL (ref 6–8.2)
RBC # BLD AUTO: 4.67 M/UL (ref 4.7–6.1)
RBC # BLD AUTO: 5.04 M/UL (ref 4.7–6.1)
RBC BLD AUTO: PRESENT
RBC BLD AUTO: PRESENT
SIGNIFICANT IND 70042: ABNORMAL
SITE SITE: ABNORMAL
SODIUM SERPL-SCNC: 137 MMOL/L (ref 135–145)
SODIUM SERPL-SCNC: 137 MMOL/L (ref 135–145)
SOURCE SOURCE: ABNORMAL
VARIANT LYMPHS BLD QL SMEAR: NORMAL
WBC # BLD AUTO: 8.6 K/UL (ref 4.8–10.8)
WBC # BLD AUTO: 9.2 K/UL (ref 4.8–10.8)

## 2020-08-17 PROCEDURE — A9270 NON-COVERED ITEM OR SERVICE: HCPCS | Performed by: SURGERY

## 2020-08-17 PROCEDURE — 700102 HCHG RX REV CODE 250 W/ 637 OVERRIDE(OP): Performed by: STUDENT IN AN ORGANIZED HEALTH CARE EDUCATION/TRAINING PROGRAM

## 2020-08-17 PROCEDURE — A9270 NON-COVERED ITEM OR SERVICE: HCPCS | Performed by: STUDENT IN AN ORGANIZED HEALTH CARE EDUCATION/TRAINING PROGRAM

## 2020-08-17 PROCEDURE — 80053 COMPREHEN METABOLIC PANEL: CPT | Mod: 91

## 2020-08-17 PROCEDURE — 85027 COMPLETE CBC AUTOMATED: CPT | Mod: 91

## 2020-08-17 PROCEDURE — 80053 COMPREHEN METABOLIC PANEL: CPT

## 2020-08-17 PROCEDURE — 36415 COLL VENOUS BLD VENIPUNCTURE: CPT

## 2020-08-17 PROCEDURE — 83036 HEMOGLOBIN GLYCOSYLATED A1C: CPT

## 2020-08-17 PROCEDURE — RXMED WILLOW AMBULATORY MEDICATION CHARGE: Performed by: STUDENT IN AN ORGANIZED HEALTH CARE EDUCATION/TRAINING PROGRAM

## 2020-08-17 PROCEDURE — 85027 COMPLETE CBC AUTOMATED: CPT

## 2020-08-17 PROCEDURE — 99285 EMERGENCY DEPT VISIT HI MDM: CPT

## 2020-08-17 PROCEDURE — 99239 HOSP IP/OBS DSCHRG MGMT >30: CPT | Mod: GC | Performed by: INTERNAL MEDICINE

## 2020-08-17 PROCEDURE — 87040 BLOOD CULTURE FOR BACTERIA: CPT | Mod: 91

## 2020-08-17 PROCEDURE — 82010 KETONE BODYS QUAN: CPT

## 2020-08-17 PROCEDURE — 83605 ASSAY OF LACTIC ACID: CPT

## 2020-08-17 PROCEDURE — 71045 X-RAY EXAM CHEST 1 VIEW: CPT

## 2020-08-17 PROCEDURE — 700102 HCHG RX REV CODE 250 W/ 637 OVERRIDE(OP): Performed by: SURGERY

## 2020-08-17 PROCEDURE — 82962 GLUCOSE BLOOD TEST: CPT

## 2020-08-17 PROCEDURE — 86780 TREPONEMA PALLIDUM: CPT

## 2020-08-17 PROCEDURE — 700111 HCHG RX REV CODE 636 W/ 250 OVERRIDE (IP): Performed by: STUDENT IN AN ORGANIZED HEALTH CARE EDUCATION/TRAINING PROGRAM

## 2020-08-17 PROCEDURE — 85007 BL SMEAR W/DIFF WBC COUNT: CPT

## 2020-08-17 PROCEDURE — 85007 BL SMEAR W/DIFF WBC COUNT: CPT | Mod: 91

## 2020-08-17 RX ORDER — AMOXICILLIN AND CLAVULANATE POTASSIUM 875; 125 MG/1; MG/1
1 TABLET, FILM COATED ORAL EVERY 12 HOURS
Qty: 6 TAB | Refills: 0 | Status: SHIPPED | OUTPATIENT
Start: 2020-08-17 | End: 2020-08-17 | Stop reason: SDUPTHER

## 2020-08-17 RX ORDER — ENALAPRIL MALEATE 2.5 MG/1
2.5 TABLET ORAL DAILY
Qty: 30 TAB | Refills: 1 | Status: SHIPPED | OUTPATIENT
Start: 2020-08-18

## 2020-08-17 RX ORDER — AMOXICILLIN AND CLAVULANATE POTASSIUM 875; 125 MG/1; MG/1
1 TABLET, FILM COATED ORAL EVERY 12 HOURS
Qty: 14 TAB | Refills: 0 | Status: ON HOLD | OUTPATIENT
Start: 2020-08-17 | End: 2020-08-19

## 2020-08-17 RX ADMIN — Medication 100 MG: at 04:49

## 2020-08-17 RX ADMIN — ENALAPRIL MALEATE 2.5 MG: 2.5 TABLET ORAL at 04:45

## 2020-08-17 RX ADMIN — ENOXAPARIN SODIUM 40 MG: 40 INJECTION SUBCUTANEOUS at 06:00

## 2020-08-17 RX ADMIN — ASPIRIN 81 MG: 81 TABLET, COATED ORAL at 04:45

## 2020-08-17 RX ADMIN — THERA TABS 1 TABLET: TAB at 04:46

## 2020-08-17 RX ADMIN — ATORVASTATIN CALCIUM 40 MG: 40 TABLET, FILM COATED ORAL at 04:46

## 2020-08-17 RX ADMIN — FOLIC ACID 1 MG: 1 TABLET ORAL at 04:46

## 2020-08-17 RX ADMIN — AMOXICILLIN AND CLAVULANATE POTASSIUM 1 TABLET: 875; 125 TABLET, FILM COATED ORAL at 04:45

## 2020-08-17 ASSESSMENT — ENCOUNTER SYMPTOMS
DEPRESSION: 0
DOUBLE VISION: 0
BACK PAIN: 0
FOCAL WEAKNESS: 0
VOMITING: 0
PALPITATIONS: 0
HEMOPTYSIS: 0
CHILLS: 0
HALLUCINATIONS: 0
SHORTNESS OF BREATH: 0
SINUS PAIN: 0
DIZZINESS: 0
COUGH: 0
MYALGIAS: 0
HEARTBURN: 0
DIAPHORESIS: 0
CONSTIPATION: 0
SPUTUM PRODUCTION: 0
FEVER: 0
WHEEZING: 0
SENSORY CHANGE: 0
HEADACHES: 0
PHOTOPHOBIA: 0
NAUSEA: 0
ORTHOPNEA: 0
BLURRED VISION: 0
BLOOD IN STOOL: 0
SPEECH CHANGE: 0
SORE THROAT: 0
ABDOMINAL PAIN: 0
BRUISES/BLEEDS EASILY: 0
NECK PAIN: 0

## 2020-08-17 ASSESSMENT — FIBROSIS 4 INDEX: FIB4 SCORE: 1.48

## 2020-08-17 NOTE — CARE PLAN
Problem: Bowel/Gastric:  Goal: Normal bowel function is maintained or improved  Outcome: PROGRESSING AS EXPECTED     Bowel movement x3 today; Adequate PO intake    Problem: Fluid Volume:  Goal: Will maintain balanced intake and output  Outcome: PROGRESSING AS EXPECTED     IV fluids continue.

## 2020-08-17 NOTE — PROGRESS NOTES
Handoff report received. Assumed patient care. Patient sitting up in bed.   Patient not in distress. Bed alarm is on. Safety precautions in place. Call light and personal belongings within reach. Educated to call for assistance if needed.

## 2020-08-17 NOTE — DISCHARGE INSTRUCTIONS
Finish Antibiotic treatment total 10 days  Follow up with surgery and PCP  Discharge Instructions    Discharged to home by car with relative. Discharged via wheelchair, hospital escort: Yes.  Special equipment needed: Not Applicable    Be sure to schedule a follow-up appointment with your primary care doctor or any specialists as instructed.     Discharge Plan:   Diet Plan: Discussed  Activity Level: Discussed  Confirmed Follow up Appointment: Appointment Scheduled  Confirmed Symptoms Management: Discussed  Medication Reconciliation Updated: Yes    I understand that a diet low in cholesterol, fat, and sodium is recommended for good health. Unless I have been given specific instructions below for another diet, I accept this instruction as my diet prescription.     Special Instructions: None    · Is patient discharged on Warfarin / Coumadin?   No     Depression / Suicide Risk    As you are discharged from this [facility] facility, it is important to learn how to keep safe from harming yourself.    Recognize the warning signs:  · Abrupt changes in personality, positive or negative- including increase in energy   · Giving away possessions  · Change in eating patterns- significant weight changes-  positive or negative  · Change in sleeping patterns- unable to sleep or sleeping all the time   · Unwillingness or inability to communicate  · Depression  · Unusual sadness, discouragement and loneliness  · Talk of wanting to die  · Neglect of personal appearance   · Rebelliousness- reckless behavior  · Withdrawal from people/activities they love  · Confusion- inability to concentrate     If you or a loved one observes any of these behaviors or has concerns about self-harm, here's what you can do:  · Talk about it- your feelings and reasons for harming yourself  · Remove any means that you might use to hurt yourself (examples: pills, rope, extension cords, firearm)  · Get professional help from the community (Mental Health,  Substance Abuse, psychological counseling)  · Do not be alone:Call your Safe Contact- someone whom you trust who will be there for you.  · Call your local CRISIS HOTLINE 246-1922 or 717-220-8861  · Call your local Children's Mobile Crisis Response Team [place] (878) 037-1510 or www.[employer].DrinkSendo  · Call the toll free National Suicide Prevention Hotlines   · National Suicide Prevention Lifeline 859-476-GBAH (4734)  · National [employer] Line Network 800-SUICIDE (346-5774)    Alteración del estado mental   (Altered Mental Status)  La alteración del estado mental hace referencia a un cambio anormal en la respuesta y el estado de conciencia. Puede afectar el habla, el pensamiento, la movilidad, la memoria, la capacidad de concentración o el estado de alerta. Varía desde un estado leve de confusión hasta la falta de respuesta total (coma). Puede ser un signo de iva enfermedad subyacente grave. La evaluación y el tratamiento médico precoz son necesarios en los pacientes con alteración del estado mental.   CAUSAS   · Bajo nivel de azúcar en maura (hipoglucemia) o diabetes.  · Pérdida importante de líquido corporal (deshidratación) o un desequilibrio de sales (electrolitos) en el organismo.  · Ictus u otro problema neurológico luba demencia o delirio.  · Lesión o tumor en la veronique.  · Sobredosis de drogas o alcohol.  · Exposición a toxinas o venenos.  · Depresión, ansiedad y estrés.  · Un nivel bajo de oxígeno (hipoxia).  · Iva infección.  · Pérdida de maura.  · Sacudidas o temblores (convulsiones).  · Problemas cardíacos luba infarto o problemas del ritmo cardíaco (arritmias).  · La temperatura corporal demasiado baja o demasiado christiano (hipotermia o hipertermia).  DIAGNÓSTICO   El diagnóstico se basa en la historia, los síntomas, el examen físico y neurológico y estudios de diagnóstico. Las estudios diagnósticos pueden ser:   · Medición de la presión arterial, el pulso, la respiración y los niveles de oxígeno (signos  vitales).  · Análisis de maura.  · Análisis de orina.  · Radiografías.  · Exploración magnética computarizada (resonancia magnética, IRM)  · Exploración de lakshmi X computarizada (tomografía computada, TC)  TRATAMIENTO   El tratamiento dependerá de la causa. Puede incluir:   · Control de iva enfermedad médica o mental subyacente.  · Tratamiento en cuidados intensivos o de apoyo en el hospital.  INSTRUCCIONES PARA EL CUIDADO EN EL HOGAR   · Solo tome medicamentos de venta li o recetados para el dolor, malestar o fiebre, según las indicaciones del médico.  · Controle las enfermedades subyacentes según las indicaciones de graves médico.  · Consuma iva dieta ciro, fidel balanceada para mantener la fuerza.  · Únase a un demetria de apoyo o a un programa de prevención para enfrentar la enfermedad o el traumatismo que causó la alteración del estado mental. Pídale a graves médico que lo ayude a elegir un programa que sea de utilidad para usted.  · Concurra a las visitas de control con el médico para realizar pruebas adicionales, o recibir vacunas, según las indicaciones.  SOLICITE ATENCIÓN MÉDICA SI:   · No se siente fidel o tiene escalofríos.  · Usted o graves marissa notan un cambio en graves comportamiento o en graves estado de alerta.  · Tiene problemas para seguir el plan de tratamiento que le ha indicado el médico.  · Tiene preguntas o preocupaciones.  SOLICITE ATENCIÓN MÉDICA DE INMEDIATO SI:   · Tiene el ritmo cardíaco acelerado o siente dolor en el pecho.  · Tiene dificultad para respirar.  · Tiene fiebre.  · Siente dolor de veronique o rigidez en el cong.  · Tose y escupe maura.  · Observa maura en la orina.  · Se siente muy agitado o confundido.  ASEGÚRESE DE QUE:   · Comprende estas instrucciones.  · Controlará graves enfermedad.  · Solicitará ayuda de inmediato si no mejora o si empeora.     Esta información no tiene luba fin reemplazar el consejo del médico. Asegúrese de hacerle al médico cualquier pregunta que tenga.     Document  Released: 06/18/2013  [employer] Interactive Patient Education ©2016 [employer]      Amoxicillin capsules or tablets  ¿Qué es zane medicamento?  La AMOXICILINA es un antibiótico penicilínico. Se utiliza en el tratamiento de ciertos tipos de infecciones bacterianas. No es efectivo para resfríos, gripe u otras infecciones de origen viral.  Zane medicamento puede ser utilizado para otros usos; si tiene alguna pregunta consulte con graves proveedor de atención médica o con graves farmacéutico.  MARCAS COMUNES: Amoxil, Moxilin, Sumox, Trimox  ¿Qué le han informar a mi profesional de la barbara antes de suhail zane medicamento?  Necesita saber si usted presenta alguno de los siguientes problemas o situaciones:  · asma  · enfermedad renal  · iva reacción alérgica o inusual a la amoxicilina, otras penicilinas, antibióticos cefalosporínicos, otros medicamentos, alimentos, colorantes o conservadores  · si está embarazada o buscando quedar embarazada  · si está amamantando a un bebé  ¿Cómo han utilizar zane medicamento?  [place] zane medicamento por vía oral con un vaso de agua. Siga las instrucciones de la etiqueta del medicamento. Zane medicamento se puede suhail con alimentos o con el estómago vacío. [place] nafisa dosis a intervalos regulares. No tome graves medicamento con iva frecuencia mayor a la indicada. Complete todo el tratamiento con el medicamento según lo haya recetado graves médico aun si considera que graves problema ha urbano. No omita ninguna dosis o suspenda el uso de graves medicamento antes de lo indicado.  Hable con graves pediatra para informarse acerca del uso de zane medicamento en niños. Aunque zane medicamento se puede recetar para condiciones selectivas, las precauciones se aplican.  Sobredosis: Póngase en contacto inmediatamente con un centro toxicológico o iva fernando de urgencia si usted sara que haya tomado demasiado medicamento.  ATENCIÓN: Zane medicamento es solo para usted. No comparta zane medicamento con nadie.  ¿Qué sucede si  me olvido de iva dosis?  Si olvida iva dosis, tómela lo antes posible. Si es roc la hora de la próxima dosis, tome sólo peewee dosis. No tome dosis adicionales o dobles.  ¿Qué puede interactuar con zane medicamento?  · amilorida  · píldoras anticonceptivas  · cloranfenicol  · macrólidos  · probenecid  · sulfonamidas  · tetraciclina  Puede ser que esta lista no menciona todas las posibles interacciones. Informe a graves profesional de la barbara de todos los productos a base de hierbas, medicamentos de venta li o suplementos nutritivos que esté tomando. Si usted fuma, consume bebidas alcohólicas o si utiliza drogas ilegales, indíqueselo también a graves profesional de la barbara. Algunas sustancias pueden interactuar con graves medicamento.  ¿A qué han estar atento al usar zane medicamento?  Si los síntomas no mejoran a los 2 ó 3 días, consulte con graves médico o con graves profesional de la barbara. [place] todas las dosis de graves medicamento luba se le haya indicado. No omita ninguna dosis o suspenda el uso de graves medicamento antes de lo indicado.  Si es diabético podrá obtener un resultado positivo falso en los análisis de determinación del nivel de azúcar en la orina con algunas marcas de pruebas de orina. Consulte con graves médico.  No trate la diarrea con productos de venta li. Comuníquese con graves médico si tiene diarrea que dura más de 2 días o si es severa y acuosa.  ¿Qué efectos secundarios puedo tener al utilizar zane medicamento?  Efectos secundarios que debe informar a graves médico o a graves profesional de la barbara tan pronto luba sea posible:  · reacciones alérgicas luba erupción cutánea, picazón o urticarias, hinchazón de la vincenzo, labios o lengua  · problemas respiratorios  · orina de color amarillo oscuro  · enrojecimiento, formación de ampollas, descamación o aflojamiento de la piel, inclusive dentro de la boca  · convulsiones  · diarrea severa o acuosa  · dificultad para orinar o cambios en el volumen de orina  · sangrado o  magulladuras inusuales  · cansancio o debilidad inusual  · color amarillento de ojos o piel  Efectos secundarios que, por lo general, no requieren atención médica (debe informarlos a graves médico o a graves profesional de la barbara si persisten o si son molestos):  · mareos  · dolor de veronique  · malestar estomacal  · dificultad para dormir  Puede ser que esta lista no menciona todos los posibles efectos secundarios. Comuníquese a graves médico por asesoramiento médico sobre los efectos secundarios. Usted puede informar los efectos secundarios a la FDA por teléfono al [phone]-[phone].  ¿Dónde han guardar mi medicina?  Manténgala fuera del alcance de los niños.  Guárdela a temperatura, entre 20 y 25 grados C (68 y 77 grados F). Mantenga el envase fidel cerrado. Deseche todo el medicamento que no haya utilizado, después de la fecha de vencimiento.  ATENCIÓN: Antonella folleto es un resumen. Puede ser que no cubra toda la posible información. Si usted tiene preguntas acerca de esta medicina, consulte con graves médico, graves farmacéutico o graves profesional de la barbara.  © 2020 [employer] (2016-02-09 00:00:00)

## 2020-08-17 NOTE — PROGRESS NOTES
Recent appendectomy and drain placement for ruptured appendicitis  Discharged Saturday am, returned later with AMS  Intended to complete 10 day augmentin course post op, will order to complete 8/23  Dr. Rowan notified of readmission

## 2020-08-17 NOTE — PROGRESS NOTES
Pt discharged to home with family. Discharge instructions reviewed, and signed.     Extensive education given to family and patient regarding medications, follow up appointments, and diabetes. All questions and concerns answered.

## 2020-08-17 NOTE — DOCUMENTATION QUERY
Formerly [facility], [facility]                                                                       Query Response Note      PATIENT:               LILIA MEJIA  ACCT #:                  [id number]  MRN:                     [id number]  :                      1955  ADMIT DATE:       2020 7:53 AM  DISCH DATE:        8/15/2020 12:55 PM  RESPONDING  PROVIDER #:        [id number]           QUERY TEXT:    A sodium lab value of 131 - 133 is noted in the Medical Record.  Can a diagnosis be specified to support this finding?    NOTE:  If an appropriate response is not listed below, please respond with a new note.    The patient's Clinical Indicators include:  - Findings:  sodium 131 - 133 per lab results   - Treatments:  NS, repeat labs   - Risk factors:  acute appendicitis with perforation, abscess, HTN, DM  Options provided:   -- Hyponatremia   -- Findings are clinically insignificant   -- Unable to determine      Query created by: Jackeline Levy on 2020 5:24 PM    RESPONSE TEXT:    Hyponatremia          Electronically signed by:  JUS NO MD 2020 12:26 PM

## 2020-08-17 NOTE — TELEPHONE ENCOUNTER
"He was discharged from [facility] today (8/17/20), blood sugar at 1330 was 259, @ 1455 it was 466, gave med @ 1330 metformin 1 pill & glimepiride 1 pill.  Had surgery for appendix & discharged, two days later had diabetic acidosis & hospitalized, and discharged today.     Called PCP's office, they recommended ED, pt refused ED after blood sugar @ 1541 was 183.  Daughter to recheck blood sugar in one hour.  Offered UC as option as well, gave address of closest , also gave nurse triage number to call back if needed.      Also send [employer] message to Dr. Otero as well.      Reason for Disposition  • Blood glucose > 400 mg/dL (22.2 mmol/L)    Additional Information  • Negative: Unconscious or difficult to awaken  • Negative: Acting confused (e.g., disoriented, slurred speech)  • Negative: Very weak (can't stand)  • Negative: Sounds like a life-threatening emergency to the triager  • Negative: Vomiting and signs of dehydration (e.g., very dry mouth, lightheaded, dark urine)  • Negative: Blood glucose > 240 mg/dL (13.3 mmol/L) and rapid breathing    Answer Assessment - Initial Assessment Questions  1. BLOOD GLUCOSE: \"What is your blood glucose level?\" 466        2. ONSET: \"When did you check the blood glucose?\"      1455 today  3. USUAL RANGE: \"What is your glucose level usually?\" (e.g., usual fasting morning value, usual evening value)      108 this morning in hospital, not sure sometimes 200 & sometimes higher, he checks it one time a week @ night.     4. KETONES: \"Do you check for ketones (urine or blood test strips)?\" If yes, ask: \"What does the test show now?\"       No   5. TYPE 1 or 2:  \"Do you know what type of diabetes you have?\"  (e.g., Type 1, Type 2, Gestational; doesn't know)       Type 2  6. INSULIN: \"Do you take insulin?\" \"What type of insulin(s) do you use? What is the mode of delivery? (syringe, pen (e.g., injection or  pump)?\"       metforman U glimepiride  7. DIABETES PILLS: \"Do you take any pills for " "your diabetes?\" If yes, ask: \"Have you missed taking any pills recently?\"      See above   8. OTHER SYMPTOMS: \"Do you have any symptoms?\" (e.g., fever, frequent urination, difficulty breathing, dizziness, weakness, vomiting)      Feels fine, no pain, scared when saw number of 466, feet are cold.    9. PREGNANCY: \"Is there any chance you are pregnant?\" \"When was your last menstrual period?\"      NA    Protocols used: DIABETES - HIGH BLOOD SUGAR-A-OH      "

## 2020-08-17 NOTE — DISCHARGE PLANNING
Meds-to-Beds: Discharge prescription order listed below delivered to patient's bedside. RN notified. Patient counseled.  Patient requested that daughter receive this information as she assists patient with med management. RN states she will review with patient's daughter.     Additional order for enalapril received by [facility] pharmacy. Too soon through insurance. Last filled 7/31 at [facility] and should process after 8/22/20. RN notified and she will inform patient's daughter.      Berlin Lora   Home Medication Instructions LOLIS:[id number]    Printed on:08/17/20 1118   Medication Information                      amoxicillin-clavulanate (AUGMENTIN) 875-125 MG Tab  Take 1 Tablet by mouth every 12 hours for 7 days.               Daniella Simmons, PharmD

## 2020-08-17 NOTE — PROGRESS NOTES
Daily Progress Note:     Date of Service: 8/17/2020  Primary Team: [facility] Blue Team   Attending: Paul Arellano M.D.   Senior Resident: Dr. Bolaños  Intern: Dr. Bowman  Contact:  [phone]    Chief Complaint:   Altered Mental Status  Post Op Apendectomy    Subjective:  No acute events overnight reported  Patient has remained asymptomatic, alert and oriented times 4.  Patient denies any fever, chills, night sweats, or abdominal pain.  Tolerating well oral intake.  AM labs remarkable for mild elevation of AST, ALT, potentially sec to Abx therapy?  Patient will need to follow up outpatient with his Surgeon Dr Hicks and also PCP   Repeat Liver enzymes before his next Dr appointment.  DAVID drain no output overnight.  Given Oral antibiotics to complete treatment as per Surgeon.    Consultants/Specialty:  Surgery    Review of Systems:      Review of Systems   Constitutional: Negative for chills, diaphoresis, fever and malaise/fatigue.   HENT: Negative for ear discharge, hearing loss, sinus pain, sore throat and tinnitus.    Eyes: Negative for blurred vision, double vision and photophobia.   Respiratory: Negative for cough, hemoptysis, sputum production, shortness of breath and wheezing.    Cardiovascular: Negative for chest pain, palpitations, orthopnea and leg swelling.   Gastrointestinal: Negative for abdominal pain, blood in stool, constipation, heartburn, nausea and vomiting.   Genitourinary: Negative for dysuria, frequency, hematuria and urgency.   Musculoskeletal: Negative for back pain, myalgias and neck pain.   Skin: Negative for itching and rash.   Neurological: Negative for dizziness, sensory change, speech change, focal weakness and headaches.   Endo/Heme/Allergies: Does not bruise/bleed easily.   Psychiatric/Behavioral: Negative for depression, hallucinations and suicidal ideas.       Objective Data:   Physical Exam:   Vitals:   Temp:  [36.1 °C (97 °F)-36.5 °C (97.7 °F)] 36.1 °C (97 °F)  Pulse:   [70-97] 79  Resp:  [16-19] 19  BP: (142-163)/(87-97) 160/97  SpO2:  [92 %-98 %] 93 %     Physical Exam  Vitals signs reviewed.   Constitutional:       General: He is not in acute distress.     Appearance: Normal appearance. He is not ill-appearing or diaphoretic.   HENT:      Head: Normocephalic and atraumatic.      Nose: Nose normal. No congestion or rhinorrhea.      Mouth/Throat:      Mouth: Mucous membranes are moist.      Pharynx: Oropharynx is clear. No oropharyngeal exudate or posterior oropharyngeal erythema.   Eyes:      General: No scleral icterus.     Extraocular Movements: Extraocular movements intact.      Conjunctiva/sclera: Conjunctivae normal.      Pupils: Pupils are equal, round, and reactive to light.   Neck:      Musculoskeletal: Normal range of motion and neck supple. No neck rigidity or muscular tenderness.   Cardiovascular:      Rate and Rhythm: Normal rate and regular rhythm.      Pulses: Normal pulses.      Heart sounds: Normal heart sounds. No murmur.   Pulmonary:      Effort: Pulmonary effort is normal. No respiratory distress.      Breath sounds: Normal breath sounds. No wheezing or rhonchi.   Abdominal:      General: Bowel sounds are normal. There is no distension.      Palpations: Abdomen is soft.      Tenderness: There is no abdominal tenderness. There is no guarding or rebound.   Musculoskeletal: Normal range of motion.         General: No swelling or tenderness.      Right lower leg: No edema.      Left lower leg: No edema.   Skin:     General: Skin is warm.      Capillary Refill: Capillary refill takes less than 2 seconds.      Coloration: Skin is not jaundiced.      Findings: No bruising or erythema.   Neurological:      General: No focal deficit present.      Mental Status: He is alert and oriented to person, place, and time. Mental status is at baseline.      Cranial Nerves: No cranial nerve deficit.   Psychiatric:         Mood and Affect: Mood normal.         Behavior: Behavior  normal.         Thought Content: Thought content normal.           Labs:   Review    Imaging:   Review    Problem Representation:       * Altered mental status, unspecified  Assessment & Plan  - Patient presented with altered mental status and confusion post laparoscopic effective appendectomy on 8/13.   -  Alert and oriented following morning   - CT abdomen and CT head within normal limits, chest x-ray reviewed no significant infiltrates noted except for right-sided atelectasis minimal.    - 8/17 alert and oriented times 4, asymptomatic, no fever, elevated white count or new complaints.            Patient tolerating well oral intake, ambulating, passing gas, voiding well, regular bowel movements.                 Plan  - Resume diet  - Encourage ambulation  - Continue Oral antibiotics  - Finish Abx regimen as recommended by Surgeon  - PCP and Surgery follow up    Alcohol use  Assessment & Plan  - Patient has history of alcohol use drinks about 4-5 beers a day, last drink on 8/12.    - No signs of withdrawal.  - Patient asymptomatic.  - May continue multivitamins outpatient  - ETOH cessation     S/P laparoscopic appendectomy  Assessment & Plan  - Patient underwent laparoscopic appendectomy due to ruptured appendix, has a DAVID drain in place done on 8/13.  -   - 8/17 Unremarkable physical exam, afebrile, no WBC elevation  - DAVID drain output   - Continue Abx therapy complete 10 days per Surgery  - Recommend follow-up outpatient on discharge with Surgery and PCP      Type 2 diabetes mellitus (HCC)  Assessment & Plan  - Patient has history of non-insulin-dependent diabetes mellitus on metformin and sulfonylureas.  - On presentation blood sugars were slightly elevated in the 200s with mild elevation in beta hydroxybutyrate and anion gap.   -  Anion gap now closed after received fluids, but discontinued. Currently with great oral intake.  - 8/17 CMP   - Continue insulin sliding scale for now  - Pending hemoglobin A1c  -  Continue ACCu checks inpatient  - Patient asymptomatic, alert and oriented times 4, no fever or elevated white count  - Will be discharged home with follow up with Surgery and PCP      Essential hypertension- (present on admission)  Assessment & Plan  - Blood pressure stable   - Continue home medications  - BP in the low 140's SBP      Elevated liver enzymes  Assessment & Plan  - Mild elevation of Liver enzymes noted on CMP on 8/17  - AST:49, ALT:59  - Previously WNL  - Recent Abx initiation ( Augmentin )  - Sec to Abx?  - Patient also positive for ETOH use  - Follow up outpatient monitoring with PCP

## 2020-08-17 NOTE — CARE PLAN
Problem: Communication  Goal: The ability to communicate needs accurately and effectively will improve  Outcome: PROGRESSING AS EXPECTED  Language hotline used     Problem: Knowledge Deficit  Goal: Knowledge of disease process/condition, treatment plan, diagnostic tests, and medications will improve  Outcome: PROGRESSING AS EXPECTED   Pt educated on POC for the day, disease process, upcoming tests, and medication information. Will continue to answer questions and educate pt as necessary.

## 2020-08-17 NOTE — DISCHARGE PLANNING
Anticipated Discharge Disposition: Home    Action: SW completed chart review. D/C order in: no needs per notes. Here for appendectomy, doing well now.     Barriers to Discharge: None    Plan: D/C home. No needs per care coordination.

## 2020-08-17 NOTE — ASSESSMENT & PLAN NOTE
- Mild elevation of Liver enzymes noted on CMP on 8/17  - AST:49, ALT:59  - Previously WNL  - Recent Abx initiation ( Augmentin )  - Sec to Abx?  - Patient also positive for ETOH use  - Follow up outpatient monitoring with PCP

## 2020-08-18 ENCOUNTER — APPOINTMENT (OUTPATIENT)
Dept: RADIOLOGY | Facility: MEDICAL CENTER | Age: 65
End: 2020-08-18
Attending: EMERGENCY MEDICINE
Payer: MEDICARE

## 2020-08-18 PROBLEM — E11.10 DKA (DIABETIC KETOACIDOSES): Status: ACTIVE | Noted: 2020-08-18

## 2020-08-18 PROBLEM — E87.6 HYPOKALEMIA: Status: ACTIVE | Noted: 2020-08-18

## 2020-08-18 LAB
AMPHET UR QL SCN: NEGATIVE
ANION GAP SERPL CALC-SCNC: 15 MMOL/L (ref 7–16)
APPEARANCE UR: CLEAR
B-OH-BUTYR SERPL-MCNC: 1.46 MMOL/L (ref 0.02–0.27)
BARBITURATES UR QL SCN: NEGATIVE
BASOPHILS # BLD AUTO: 2 % (ref 0–1.8)
BASOPHILS # BLD: 0.18 K/UL (ref 0–0.12)
BENZODIAZ UR QL SCN: POSITIVE
BILIRUB UR QL STRIP.AUTO: NEGATIVE
BUN SERPL-MCNC: 9 MG/DL (ref 8–22)
BZE UR QL SCN: NEGATIVE
CALCIUM SERPL-MCNC: 8.9 MG/DL (ref 8.5–10.5)
CANNABINOIDS UR QL SCN: NEGATIVE
CHLORIDE SERPL-SCNC: 102 MMOL/L (ref 96–112)
CHOLEST SERPL-MCNC: 110 MG/DL (ref 100–199)
CO2 SERPL-SCNC: 21 MMOL/L (ref 20–33)
COLOR UR: YELLOW
CREAT SERPL-MCNC: 0.49 MG/DL (ref 0.5–1.4)
EKG IMPRESSION: NORMAL
EOSINOPHIL # BLD AUTO: 0.27 K/UL (ref 0–0.51)
EOSINOPHIL NFR BLD: 3 % (ref 0–6.9)
ERYTHROCYTE [DISTWIDTH] IN BLOOD BY AUTOMATED COUNT: 40.6 FL (ref 35.9–50)
ETHANOL BLD-MCNC: <10.1 MG/DL (ref 0–10.1)
GLUCOSE BLD-MCNC: 114 MG/DL (ref 65–99)
GLUCOSE BLD-MCNC: 191 MG/DL (ref 65–99)
GLUCOSE BLD-MCNC: 218 MG/DL (ref 65–99)
GLUCOSE BLD-MCNC: 231 MG/DL (ref 65–99)
GLUCOSE SERPL-MCNC: 152 MG/DL (ref 65–99)
GLUCOSE UR STRIP.AUTO-MCNC: >=1000 MG/DL
HCT VFR BLD AUTO: 40.6 % (ref 42–52)
HDLC SERPL-MCNC: 25 MG/DL
HGB BLD-MCNC: 14.1 G/DL (ref 14–18)
HIV 1+2 AB+HIV1 P24 AG SERPL QL IA: NORMAL
IMM GRANULOCYTES # BLD AUTO: 0.53 K/UL (ref 0–0.11)
IMM GRANULOCYTES NFR BLD AUTO: 5.9 % (ref 0–0.9)
KETONES UR STRIP.AUTO-MCNC: 15 MG/DL
LDLC SERPL CALC-MCNC: 57 MG/DL
LEUKOCYTE ESTERASE UR QL STRIP.AUTO: NEGATIVE
LYMPHOCYTES # BLD AUTO: 1.28 K/UL (ref 1–4.8)
LYMPHOCYTES NFR BLD: 14.2 % (ref 22–41)
MAGNESIUM SERPL-MCNC: 2.3 MG/DL (ref 1.5–2.5)
MCH RBC QN AUTO: 30.9 PG (ref 27–33)
MCHC RBC AUTO-ENTMCNC: 34.7 G/DL (ref 33.7–35.3)
MCV RBC AUTO: 88.8 FL (ref 81.4–97.8)
METHADONE UR QL SCN: NEGATIVE
MICRO URNS: ABNORMAL
MONOCYTES # BLD AUTO: 0.83 K/UL (ref 0–0.85)
MONOCYTES NFR BLD AUTO: 9.2 % (ref 0–13.4)
NEUTROPHILS # BLD AUTO: 5.91 K/UL (ref 1.82–7.42)
NEUTROPHILS NFR BLD: 65.7 % (ref 44–72)
NITRITE UR QL STRIP.AUTO: NEGATIVE
NRBC # BLD AUTO: 0 K/UL
NRBC BLD-RTO: 0 /100 WBC
OPIATES UR QL SCN: NEGATIVE
OXYCODONE UR QL SCN: NEGATIVE
PCP UR QL SCN: NEGATIVE
PH UR STRIP.AUTO: 6 [PH] (ref 5–8)
PLATELET # BLD AUTO: 292 K/UL (ref 164–446)
PMV BLD AUTO: 9.2 FL (ref 9–12.9)
POTASSIUM SERPL-SCNC: 3.7 MMOL/L (ref 3.6–5.5)
PROPOXYPH UR QL SCN: NEGATIVE
PROT UR QL STRIP: NEGATIVE MG/DL
RBC # BLD AUTO: 4.57 M/UL (ref 4.7–6.1)
RBC UR QL AUTO: NEGATIVE
SODIUM SERPL-SCNC: 138 MMOL/L (ref 135–145)
SP GR UR STRIP.AUTO: 1.01
TREPONEMA PALLIDUM IGG+IGM AB [PRESENCE] IN SERUM OR PLASMA BY IMMUNOASSAY: NORMAL
TRIGL SERPL-MCNC: 139 MG/DL (ref 0–149)
TSH SERPL DL<=0.005 MIU/L-ACNC: 2.81 UIU/ML (ref 0.38–5.33)
UROBILINOGEN UR STRIP.AUTO-MCNC: 0.2 MG/DL
VIT B12 SERPL-MCNC: 885 PG/ML (ref 211–911)
WBC # BLD AUTO: 9 K/UL (ref 4.8–10.8)

## 2020-08-18 PROCEDURE — 93010 ELECTROCARDIOGRAM REPORT: CPT | Performed by: INTERNAL MEDICINE

## 2020-08-18 PROCEDURE — 82607 VITAMIN B-12: CPT

## 2020-08-18 PROCEDURE — 700101 HCHG RX REV CODE 250: Performed by: STUDENT IN AN ORGANIZED HEALTH CARE EDUCATION/TRAINING PROGRAM

## 2020-08-18 PROCEDURE — 700102 HCHG RX REV CODE 250 W/ 637 OVERRIDE(OP): Performed by: STUDENT IN AN ORGANIZED HEALTH CARE EDUCATION/TRAINING PROGRAM

## 2020-08-18 PROCEDURE — G0378 HOSPITAL OBSERVATION PER HR: HCPCS

## 2020-08-18 PROCEDURE — 96374 THER/PROPH/DIAG INJ IV PUSH: CPT

## 2020-08-18 PROCEDURE — 93005 ELECTROCARDIOGRAM TRACING: CPT | Performed by: STUDENT IN AN ORGANIZED HEALTH CARE EDUCATION/TRAINING PROGRAM

## 2020-08-18 PROCEDURE — 99220 PR INITIAL OBSERVATION CARE,LEVL III: CPT | Mod: GC | Performed by: INTERNAL MEDICINE

## 2020-08-18 PROCEDURE — 70450 CT HEAD/BRAIN W/O DYE: CPT | Mod: MG

## 2020-08-18 PROCEDURE — 96375 TX/PRO/DX INJ NEW DRUG ADDON: CPT

## 2020-08-18 PROCEDURE — 85025 COMPLETE CBC W/AUTO DIFF WBC: CPT

## 2020-08-18 PROCEDURE — 96372 THER/PROPH/DIAG INJ SC/IM: CPT

## 2020-08-18 PROCEDURE — 83735 ASSAY OF MAGNESIUM: CPT

## 2020-08-18 PROCEDURE — 80061 LIPID PANEL: CPT

## 2020-08-18 PROCEDURE — 700111 HCHG RX REV CODE 636 W/ 250 OVERRIDE (IP): Performed by: STUDENT IN AN ORGANIZED HEALTH CARE EDUCATION/TRAINING PROGRAM

## 2020-08-18 PROCEDURE — 80048 BASIC METABOLIC PNL TOTAL CA: CPT

## 2020-08-18 PROCEDURE — A9270 NON-COVERED ITEM OR SERVICE: HCPCS | Performed by: STUDENT IN AN ORGANIZED HEALTH CARE EDUCATION/TRAINING PROGRAM

## 2020-08-18 PROCEDURE — 74177 CT ABD & PELVIS W/CONTRAST: CPT | Mod: MG

## 2020-08-18 PROCEDURE — 94760 N-INVAS EAR/PLS OXIMETRY 1: CPT

## 2020-08-18 PROCEDURE — G0475 HIV COMBINATION ASSAY: HCPCS

## 2020-08-18 PROCEDURE — 84443 ASSAY THYROID STIM HORMONE: CPT

## 2020-08-18 PROCEDURE — 700105 HCHG RX REV CODE 258: Performed by: STUDENT IN AN ORGANIZED HEALTH CARE EDUCATION/TRAINING PROGRAM

## 2020-08-18 PROCEDURE — 87086 URINE CULTURE/COLONY COUNT: CPT

## 2020-08-18 PROCEDURE — 700117 HCHG RX CONTRAST REV CODE 255: Performed by: EMERGENCY MEDICINE

## 2020-08-18 PROCEDURE — 36415 COLL VENOUS BLD VENIPUNCTURE: CPT

## 2020-08-18 PROCEDURE — 81003 URINALYSIS AUTO W/O SCOPE: CPT

## 2020-08-18 PROCEDURE — 82962 GLUCOSE BLOOD TEST: CPT | Mod: 91

## 2020-08-18 PROCEDURE — 700111 HCHG RX REV CODE 636 W/ 250 OVERRIDE (IP): Performed by: EMERGENCY MEDICINE

## 2020-08-18 PROCEDURE — 80307 DRUG TEST PRSMV CHEM ANLYZR: CPT

## 2020-08-18 RX ORDER — DEXTROSE MONOHYDRATE 25 G/50ML
50 INJECTION, SOLUTION INTRAVENOUS
Status: DISCONTINUED | OUTPATIENT
Start: 2020-08-18 | End: 2020-08-19 | Stop reason: HOSPADM

## 2020-08-18 RX ORDER — SODIUM CHLORIDE AND POTASSIUM CHLORIDE 300; 900 MG/100ML; MG/100ML
INJECTION, SOLUTION INTRAVENOUS CONTINUOUS
Status: DISCONTINUED | OUTPATIENT
Start: 2020-08-18 | End: 2020-08-18

## 2020-08-18 RX ORDER — ATORVASTATIN CALCIUM 10 MG/1
10 TABLET, FILM COATED ORAL DAILY
Status: DISCONTINUED | OUTPATIENT
Start: 2020-08-19 | End: 2020-08-19 | Stop reason: HOSPADM

## 2020-08-18 RX ORDER — AMOXICILLIN 250 MG
2 CAPSULE ORAL 2 TIMES DAILY
Status: DISCONTINUED | OUTPATIENT
Start: 2020-08-18 | End: 2020-08-19 | Stop reason: HOSPADM

## 2020-08-18 RX ORDER — SODIUM CHLORIDE, SODIUM LACTATE, POTASSIUM CHLORIDE, CALCIUM CHLORIDE 600; 310; 30; 20 MG/100ML; MG/100ML; MG/100ML; MG/100ML
INJECTION, SOLUTION INTRAVENOUS CONTINUOUS
Status: DISCONTINUED | OUTPATIENT
Start: 2020-08-18 | End: 2020-08-19

## 2020-08-18 RX ORDER — ENALAPRIL MALEATE 2.5 MG/1
2.5 TABLET ORAL DAILY
Status: DISCONTINUED | OUTPATIENT
Start: 2020-08-18 | End: 2020-08-19 | Stop reason: HOSPADM

## 2020-08-18 RX ORDER — BISACODYL 10 MG
10 SUPPOSITORY, RECTAL RECTAL
Status: DISCONTINUED | OUTPATIENT
Start: 2020-08-18 | End: 2020-08-19 | Stop reason: HOSPADM

## 2020-08-18 RX ORDER — POTASSIUM CHLORIDE 20 MEQ/1
40 TABLET, EXTENDED RELEASE ORAL ONCE
Status: COMPLETED | OUTPATIENT
Start: 2020-08-18 | End: 2020-08-18

## 2020-08-18 RX ORDER — ATORVASTATIN CALCIUM 40 MG/1
40 TABLET, FILM COATED ORAL DAILY
Status: DISCONTINUED | OUTPATIENT
Start: 2020-08-18 | End: 2020-08-18

## 2020-08-18 RX ORDER — ACETAMINOPHEN 325 MG/1
650 TABLET ORAL EVERY 6 HOURS PRN
Status: DISCONTINUED | OUTPATIENT
Start: 2020-08-18 | End: 2020-08-19 | Stop reason: HOSPADM

## 2020-08-18 RX ORDER — RISPERIDONE 0.5 MG/1
0.5 TABLET ORAL EVERY EVENING
Status: DISCONTINUED | OUTPATIENT
Start: 2020-08-18 | End: 2020-08-19 | Stop reason: HOSPADM

## 2020-08-18 RX ORDER — AMOXICILLIN AND CLAVULANATE POTASSIUM 875; 125 MG/1; MG/1
1 TABLET, FILM COATED ORAL EVERY 12 HOURS
Status: DISCONTINUED | OUTPATIENT
Start: 2020-08-18 | End: 2020-08-19

## 2020-08-18 RX ORDER — HALOPERIDOL 5 MG/ML
2 INJECTION INTRAMUSCULAR ONCE
Status: COMPLETED | OUTPATIENT
Start: 2020-08-18 | End: 2020-08-18

## 2020-08-18 RX ORDER — POLYETHYLENE GLYCOL 3350 17 G/17G
1 POWDER, FOR SOLUTION ORAL
Status: DISCONTINUED | OUTPATIENT
Start: 2020-08-18 | End: 2020-08-19 | Stop reason: HOSPADM

## 2020-08-18 RX ADMIN — INSULIN LISPRO 3 UNITS: 100 INJECTION, SOLUTION INTRAVENOUS; SUBCUTANEOUS at 10:53

## 2020-08-18 RX ADMIN — DOCUSATE SODIUM 50 MG AND SENNOSIDES 8.6 MG 2 TABLET: 8.6; 5 TABLET, FILM COATED ORAL at 16:47

## 2020-08-18 RX ADMIN — ASPIRIN 81 MG: 81 TABLET, COATED ORAL at 05:13

## 2020-08-18 RX ADMIN — ENALAPRIL MALEATE 2.5 MG: 2.5 TABLET ORAL at 05:13

## 2020-08-18 RX ADMIN — INSULIN LISPRO 3 UNITS: 100 INJECTION, SOLUTION INTRAVENOUS; SUBCUTANEOUS at 16:45

## 2020-08-18 RX ADMIN — AMOXICILLIN AND CLAVULANATE POTASSIUM 1 TABLET: 875; 125 TABLET, FILM COATED ORAL at 05:13

## 2020-08-18 RX ADMIN — IOHEXOL 100 ML: 350 INJECTION, SOLUTION INTRAVENOUS at 01:00

## 2020-08-18 RX ADMIN — AMOXICILLIN AND CLAVULANATE POTASSIUM 1 TABLET: 875; 125 TABLET, FILM COATED ORAL at 16:47

## 2020-08-18 RX ADMIN — SODIUM CHLORIDE, POTASSIUM CHLORIDE, SODIUM LACTATE AND CALCIUM CHLORIDE: 600; 310; 30; 20 INJECTION, SOLUTION INTRAVENOUS at 06:37

## 2020-08-18 RX ADMIN — POTASSIUM CHLORIDE 40 MEQ: 1500 TABLET, EXTENDED RELEASE ORAL at 02:47

## 2020-08-18 RX ADMIN — RISPERIDONE 0.5 MG: 0.5 TABLET ORAL at 16:47

## 2020-08-18 RX ADMIN — HALOPERIDOL LACTATE 2 MG: 5 INJECTION, SOLUTION INTRAMUSCULAR at 00:30

## 2020-08-18 RX ADMIN — SODIUM CHLORIDE, POTASSIUM CHLORIDE, SODIUM LACTATE AND CALCIUM CHLORIDE: 600; 310; 30; 20 INJECTION, SOLUTION INTRAVENOUS at 16:24

## 2020-08-18 RX ADMIN — DOCUSATE SODIUM 50 MG AND SENNOSIDES 8.6 MG 2 TABLET: 8.6; 5 TABLET, FILM COATED ORAL at 05:13

## 2020-08-18 RX ADMIN — INSULIN LISPRO 2 UNITS: 100 INJECTION, SOLUTION INTRAVENOUS; SUBCUTANEOUS at 21:02

## 2020-08-18 RX ADMIN — POTASSIUM CHLORIDE AND SODIUM CHLORIDE: 900; 300 INJECTION, SOLUTION INTRAVENOUS at 02:44

## 2020-08-18 RX ADMIN — ATORVASTATIN CALCIUM 40 MG: 40 TABLET, FILM COATED ORAL at 05:13

## 2020-08-18 ASSESSMENT — ENCOUNTER SYMPTOMS
DOUBLE VISION: 0
HALLUCINATIONS: 0
SPEECH CHANGE: 0
SPUTUM PRODUCTION: 0
MYALGIAS: 0
NAUSEA: 0
WEAKNESS: 0
PHOTOPHOBIA: 0
SORE THROAT: 0
HEMOPTYSIS: 0
SHORTNESS OF BREATH: 0
NECK PAIN: 0
DIAPHORESIS: 0
SENSORY CHANGE: 0
BLURRED VISION: 0
CHILLS: 0
FLANK PAIN: 0
TINGLING: 0
BLOOD IN STOOL: 0
ABDOMINAL PAIN: 0
BRUISES/BLEEDS EASILY: 0
SEIZURES: 0
BACK PAIN: 0
DEPRESSION: 0
FOCAL WEAKNESS: 0
FALLS: 0
WHEEZING: 0
HEARTBURN: 0
VOMITING: 0
SINUS PAIN: 0
ORTHOPNEA: 0
HEADACHES: 0
DIZZINESS: 0
PALPITATIONS: 0
COUGH: 0
FEVER: 0
NERVOUS/ANXIOUS: 1

## 2020-08-18 ASSESSMENT — LIFESTYLE VARIABLES
HAVE PEOPLE ANNOYED YOU BY CRITICIZING YOUR DRINKING: NO
DOES PATIENT WANT TO STOP DRINKING: NO
EVER_SMOKED: NEVER
CONSUMPTION TOTAL: NEGATIVE
SUBSTANCE_ABUSE: 0
EVER_SMOKED: NEVER
HOW MANY TIMES IN THE PAST YEAR HAVE YOU HAD 5 OR MORE DRINKS IN A DAY: 0
ALCOHOL_USE: YES
EVER_SMOKED: NEVER
EVER HAD A DRINK FIRST THING IN THE MORNING TO STEADY YOUR NERVES TO GET RID OF A HANGOVER: NO
TOTAL SCORE: 0
EVER FELT BAD OR GUILTY ABOUT YOUR DRINKING: NO
TOTAL SCORE: 0
TOTAL SCORE: 0
ON A TYPICAL DAY WHEN YOU DRINK ALCOHOL HOW MANY DRINKS DO YOU HAVE: 2
AVERAGE NUMBER OF DAYS PER WEEK YOU HAVE A DRINK CONTAINING ALCOHOL: 1
HAVE YOU EVER FELT YOU SHOULD CUT DOWN ON YOUR DRINKING: NO

## 2020-08-18 ASSESSMENT — COPD QUESTIONNAIRES
COPD SCREENING SCORE: 2
DO YOU EVER COUGH UP ANY MUCUS OR PHLEGM?: YES, A FEW DAYS A WEEK OR MONTH
HAVE YOU SMOKED AT LEAST 100 CIGARETTES IN YOUR ENTIRE LIFE: NO/DON'T KNOW
DURING THE PAST 4 WEEKS HOW MUCH DID YOU FEEL SHORT OF BREATH: NONE/LITTLE OF THE TIME

## 2020-08-18 ASSESSMENT — COGNITIVE AND FUNCTIONAL STATUS - GENERAL
DRESSING REGULAR UPPER BODY CLOTHING: A LITTLE
WALKING IN HOSPITAL ROOM: A LITTLE
CLIMB 3 TO 5 STEPS WITH RAILING: A LITTLE
DRESSING REGULAR LOWER BODY CLOTHING: A LITTLE
HELP NEEDED FOR BATHING: A LITTLE
STANDING UP FROM CHAIR USING ARMS: A LITTLE
MOBILITY SCORE: 20
DAILY ACTIVITIY SCORE: 20
TOILETING: A LITTLE
SUGGESTED CMS G CODE MODIFIER DAILY ACTIVITY: CJ
MOVING FROM LYING ON BACK TO SITTING ON SIDE OF FLAT BED: A LITTLE
SUGGESTED CMS G CODE MODIFIER MOBILITY: CJ

## 2020-08-18 ASSESSMENT — FIBROSIS 4 INDEX
FIB4 SCORE: 1.27
FIB4 SCORE: 1.19

## 2020-08-18 ASSESSMENT — PATIENT HEALTH QUESTIONNAIRE - PHQ9
1. LITTLE INTEREST OR PLEASURE IN DOING THINGS: NOT AT ALL
SUM OF ALL RESPONSES TO PHQ9 QUESTIONS 1 AND 2: 0
2. FEELING DOWN, DEPRESSED, IRRITABLE, OR HOPELESS: NOT AT ALL

## 2020-08-18 NOTE — PROGRESS NOTES
Bedside report received.  No overnight events per night RN. VS'S. RA. Patient A&O x 3. Disoriented to time. No complaints of pain or SOB at this time. DAVID drain in place with minimal output. POC discussed with patient. Pt verbalizes understanding. Call light and belongings with in reach. Bed locked and in lowest position, alarm and fall precautions in place.

## 2020-08-18 NOTE — CONSULTS
"BRIEF PSYCHIATRIC CONSULT NOTE: as per note on 8/13, presentation was dramatically different. This is likely delirium. Consider an EEG to confirm if unsure. Consult is cancelled for \"  dementia evaluation and management\"  "

## 2020-08-18 NOTE — ASSESSMENT & PLAN NOTE
- Laparoscopic appendectomy on 8/13/2020  - Appendix was ruptured  - DAVID drained removed on 8/18 evening  - No signs of infection  - Denies fever, chills, night sweats or redness at drain site, no pain or tenderness.  - Surgeon changed his Abx regimen to    Keflex 500mg Q6 hrs till 8/23    Metronidazole 500 mg Q8 hrs till 8/20  - Follow up with Surgery out patient  - Establish care and regular PCP follow up

## 2020-08-18 NOTE — ED NOTES
Pt  Discharged today from [facility]. (08/17/20) . Med rec  Was completed on 08/13/20 and 08/15/20. By 2 different med rec techs.

## 2020-08-18 NOTE — DIETARY
Nutrition Services: Consult for diabetes education completed. For details see note under Education tab. RD will follow PRN.

## 2020-08-18 NOTE — ED PROVIDER NOTES
ED Provider Note    CHIEF COMPLAINT  Chief Complaint   Patient presents with   • ALOC       HPI  Berlin Hurtado is a 64 y.o. male who presents to the emergency department via EMS for altered mental status.  The patient was discharged from this facility earlier today for the same complaint.  Apparently after recent appendectomy with a drain in place he has been having some abnormal behavior more kind of manic agitated and paranoid.  He was admitted overnight yesterday and had a full work-up in the ED and was within normal limits and he was discharged home.  Today family called because the patient was extremely agitated and they could not calm down, he required 8 of Versed with EMS and was placed in four-point restraints.    Currently on my evaluation the patient is very sleepy and somnolent I cannot get a great history.  He is vital signs are otherwise not significantly abnormal but I will evaluate for any acute change in his electrolytes cardiac disease or intracranial injury but he is moving all extremities    History otherwise limited secondary to altered mental status    REVIEW OF SYSTEMS  History otherwise limited secondary to altered mental status  All other review of systems are negative    PAST MEDICAL HISTORY   has a past medical history of Corneal transplant status, Diabetes (HCC), Hyperlipidemia, and Hypertension.    SOCIAL HISTORY  Social History     Tobacco Use   • Smoking status: Never Smoker   • Smokeless tobacco: Never Used   Substance and Sexual Activity   • Alcohol use: Yes     Alcohol/week: 4.2 oz     Types: 7 Cans of beer per week   • Drug use: No   • Sexual activity: Yes       SURGICAL HISTORY   has a past surgical history that includes toe arthroplasty and lap,appendectomy (N/A, 8/13/2020).    CURRENT MEDICATIONS  Home Medications    **Home medications have not yet been reviewed for this encounter**         ALLERGIES  No Known Allergies    PHYSICAL EXAM  VITAL SIGNS: /72   Pulse 87  "  Temp 36.1 °C (97 °F) (Temporal)   Resp 14   Ht 1.753 m (5' 9\")   Wt 68 kg (150 lb)   SpO2 88%   BMI 22.15 kg/m²    Pulse ox interpretation: I interpret this pulse ox as normal.  Constitutional: Sleepy in no acute distress.  HENT: Normocephalic atraumatic, MMM  Eyes: PER, Conjunctiva normal, Non-icteric.   Neck: Normal range of motion, No tenderness, Supple, No stridor.   Cardiovascular: Regular rate and rhythm, no murmurs.   Thorax & Lungs: Normal breath sounds, No respiratory distress, No wheezing, No chest tenderness.   Abdomen: Bowel sounds normal, Soft, No tenderness, left lower quadrant with a DAVID drain in place with some serosanguineous fluid no surrounding erythema, 2 other incisions are clean dry and intact with staples in place no pulsatile masses. No peritoneal signs.  Skin: Warm, Dry, No erythema, No rash.   Back: No bony tenderness, No CVA tenderness.   Extremities/MSK: Intact equal distal pulses, No edema, No tenderness, No cyanosis, no major deformities noted  Neurologic: Moving all extremities spontaneously no facial droop noted      DIFFERENTIAL DIAGNOSIS AND WORK UP PLAN    This is a 64 y.o. male who presents with ental status that began after hospitalization for anesthesia for an appendectomy this could be sundowning this could be early dementia this could be bipolar or manic behavior that was induced by anesthesia.  However could also be severe electrolyte abnormality dehydration or DKA, this could also be an intracranial injury although I doubt it but will evaluate for any new or worsening infectious process intracranial injury and then discuss case with family    DIAGNOSTIC STUDIES / PROCEDURES    EKG  No results found for this or any previous visit.    LABS  Pertinent Lab Findings  Patient CBC does show a left shift but otherwise not significantly abnormal, CMP with a mild hypokalemia and a glucose of 218, urinalysis with mostly glucose only small amount of ketones and only mildly " elevated beta hydroxybutyric rate, urine drug screen positive for benzos which I am sure he was given in the hospital yesterday blood cultures were sent lactate was normal      RADIOLOGY  CT-ABDOMEN-PELVIS WITH   Final Result      1.  Again seen surgical change consistent with recent appendectomy. A surgical drain is again seen extending into the surgical site. There is again seen some inflammatory stranding in that area with mild wall thickening of the distal ileum. This    appearance is similar to prior exam.      2.  No evidence of bowel obstruction.      3.  Fatty change of the liver.      CT-HEAD W/O   Final Result      No evidence of acute intracranial process.      DX-CHEST-PORTABLE (1 VIEW)   Final Result      No evidence of acute cardiopulmonary process.        The radiologist's interpretation of all radiological studies have been reviewed by me.      COURSE & MEDICAL DECISION MAKING  Pertinent Labs & Imaging studies reviewed. (See chart for details)    12:03 AM  Spoke w daughter at the bedside, she states that even though her father was let go earlier today last night while he was in the hospital he called his son to tell him that he thought the nurses were trying to kill him.  She states that once he got home this evening he started acting abnormally again despite feeding and making sure drink plenty of fluids and she was checking his temperature and his glucose at home around 730 he just started having a lot of high energy acting agitated not quite responding appropriately and pacing around the house which is what happened last time.  She states that this episode was larger than the prior when he was admitted for.    1:07 AM  Patient had a positive response to Haldol due to his agitation as he starting to wake up and was a little bit more agitated and constantly asking to leave and that the nurses here are trying to do something to him.  Spoke with the daughter again I discussed this could either be some  dementia with sundowning recently some paranoia possibly secondary to anesthesia but he needs to be evaluated initially by neurology and/or psychology before he can be disposed.  She is very appreciative and feels comfortable going home    1:33 AM  Spoke with [facility] at this time for rehospitalization evaluation    I verified that the patient was wearing a mask and I was wearing appropriate PPE every time I entered the room. The patient's mask was on the patient at all times during my encounter except for a brief view of the oropharynx.          FINAL IMPRESSION  1. ALOC  2. Behavior change        Electronically signed by: Alina Gurrola M.D., 8/17/2020 10:38 PM    This dictation has been created using voice recognition software and/or scribes. The accuracy of the dictation is limited by the abilities of the software and the expertise of the scribes. I expect there may be some errors of grammar and possibly content. I made every attempt to manually correct the errors within my dictation. However, errors related to voice recognition software and/or scribes may still exist and should be interpreted within the appropriate context.

## 2020-08-18 NOTE — ED NOTES
Able to use iPad , Patient is AOx3 (unaware of event) updated on POC, educated on call light, demonstrated use.

## 2020-08-18 NOTE — ED NOTES
Patient is not articulate and translators have difficulty translating. Patient has quick, raised speech.  Daughter at bedside states speech pattern is normal however his is not making any sense.  Patient appears restless and frustrated.  Medicated per MAR.

## 2020-08-18 NOTE — PROGRESS NOTES
RN unable to complete MRI screening,  pt was unable to provide all information needed. Pt's is forgetful mentation waxes and wanes. Attempted to contact family however call went straight to voicemail. Will try again later this afternoon

## 2020-08-18 NOTE — ASSESSMENT & PLAN NOTE
- Patient has been exhibiting erratic behavior intermittently since he was sent home after appendectomy on 8/15/2020  - This is his second admission after surgery  - Patient was DC yesterday 8/17 in good condition, no concerns about his mental status  - Per daughter he did very well in the afternoon, but started noticing very erratic behavior after 7-8 pm in the evening, then called EMS. He was combative and was given Versed and put on restraints.  - Patient aware of what was going on yesterday at home, he stated same story than his daughter, aware that he was acting very strangely.  - NO apparent stressors in the family per patient and daughter.  - B12 and TSH WNL, HIV and Shyphilis Negative.  - CT head and Abdomen Negative  - Patient acting normally in patient, just stating anxiety from being here in the hospital  - No history of seizures or mental illness  MRI Brain:  1.  No acute abnormality.  2.  Mild cerebral atrophy.  3.  Mild chronic microvascular ischemic disease.   4.  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                           PLAN:    - DC Home  - PCP follow up  - Consider Neurology referral  - CT

## 2020-08-18 NOTE — PROGRESS NOTES
Received report from SANA Rivero RN. Pt A&O x 2 to self and place. No report of pain. Pt transported via gurney with ACLS RN and zoll monitor on. Tele box on pt, pt's rhythm is SR (83).  Pt oriented to call light and unit routine, verbalized understanding. Pt transferred to unit with belongings. Call light and bedside table within reach. Fall precautions in place. Will continue to monitor.

## 2020-08-18 NOTE — CARE PLAN
Problem: Communication  Goal: The ability to communicate needs accurately and effectively will improve  Outcome: PROGRESSING SLOWER THAN EXPECTED  Note: Call light within reach. Educated pt to use call light as needed. Reorient and re-educate as needed. Use of Language Line  to translate. Continue to monitor.       Problem: Knowledge Deficit  Goal: Knowledge of disease process/condition, treatment plan, diagnostic tests, and medications will improve  Outcome: PROGRESSING SLOWER THAN EXPECTED  Note: Discuss and review POC with patient/family. Re-educate as needed.

## 2020-08-18 NOTE — PROGRESS NOTES
MRI safety screening completed with family at bedside. DAVID drain removed. Pt dose have a few staples at lower abdominal surgical site

## 2020-08-18 NOTE — PROGRESS NOTES
2 RN skin check completed with CHEKO Soto.   Devices in place: PIV, DAVID drain.  Skin assessed under devices: yes.  Confirmed pressure ulcers found on: none.  New potential pressure ulcers noted on: none. Wound consult placed: no.  The following interventions in place: patient turns and repositions independently, moisturizer, clean and dry linen on hospital bed, silicone NC and gray foams as needed (currently RA), offload glasses when not using.     Bilat ears intact, pink, blanching.  Bilat elbows intact and blanching.  Sacrum intact and blanching.  Bilat heels intact, pink, blanching.  Generalized skin dry.

## 2020-08-18 NOTE — PROGRESS NOTES
Daily Progress Note:     Date of Service: 8/18/2020  Primary Team: [facility] Blue Team   Attending: Paul Arellano M.D.   Senior Resident: Dr. Bolaños  Intern: Dr. Bowman  Contact:  [phone]    Chief Complaint:   Altered Behavior.    Subjective:     No acute events overnight  Patient brought by EMS after daughter called for very erratic behavior at home onset at 7-8 pm.  Patient was given Versed 8 mg and restrained by EMS.  In the ED he also received  Haldol 2 mg.  Per daughter patient did well after discharged in the afternoon, but he suddenly started behaving very erratically, pacing, not making much of sense speaking in incomplete sentences, he then became combative  after EMS arrival.  Apparently no signs of family struggles or other significant stressors per patient and daughter's statements.  Patient states that he remembers exactly all the events leading to the admission.  He continues to express a lot of anxiety while here in the hospital, but he is compliant with medical team about his further work up.  He is calm, but gets irritable and frustrated very easily.      Consultants/Specialty:  None    Review of Systems:      Review of Systems   Constitutional: Negative for chills, diaphoresis, fever and malaise/fatigue.   HENT: Negative for hearing loss, nosebleeds, sinus pain, sore throat and tinnitus.    Eyes: Negative for blurred vision, double vision and photophobia.   Respiratory: Negative for cough, hemoptysis, sputum production, shortness of breath and wheezing.    Cardiovascular: Negative for chest pain, palpitations, orthopnea and leg swelling.   Gastrointestinal: Negative for abdominal pain, blood in stool, heartburn, melena, nausea and vomiting.   Genitourinary: Negative for dysuria, flank pain, frequency, hematuria and urgency.   Musculoskeletal: Negative for back pain, falls, joint pain, myalgias and neck pain.   Skin: Negative for itching and rash.   Neurological: Negative for  dizziness, tingling, sensory change, speech change, focal weakness, seizures, weakness and headaches.   Endo/Heme/Allergies: Negative for environmental allergies. Does not bruise/bleed easily.   Psychiatric/Behavioral: Negative for depression, hallucinations, substance abuse and suicidal ideas. The patient is nervous/anxious.        Objective Data:   Physical Exam:   Vitals:   Temp:  [36 °C (96.8 °F)-36.4 °C (97.5 °F)] 36 °C (96.8 °F)  Pulse:  [72-89] 72  Resp:  [14-18] 16  BP: ()/(65-94) 155/81  SpO2:  [88 %-98 %] 95 %     Physical Exam  Vitals signs reviewed.   Constitutional:       General: He is not in acute distress.     Appearance: Normal appearance. He is not ill-appearing or diaphoretic.   HENT:      Head: Normocephalic and atraumatic.      Nose: Nose normal. No congestion or rhinorrhea.      Mouth/Throat:      Mouth: Mucous membranes are moist.      Pharynx: Oropharynx is clear. No oropharyngeal exudate or posterior oropharyngeal erythema.   Eyes:      General: No scleral icterus.     Extraocular Movements: Extraocular movements intact.      Conjunctiva/sclera: Conjunctivae normal.      Pupils: Pupils are equal, round, and reactive to light.   Neck:      Musculoskeletal: Normal range of motion and neck supple. No neck rigidity or muscular tenderness.   Cardiovascular:      Rate and Rhythm: Normal rate and regular rhythm.      Pulses: Normal pulses.      Heart sounds: Normal heart sounds. No murmur.   Pulmonary:      Effort: Pulmonary effort is normal. No respiratory distress.      Breath sounds: Normal breath sounds. No wheezing or rhonchi.   Abdominal:      General: Bowel sounds are normal. There is no distension.      Palpations: Abdomen is soft.      Tenderness: There is no abdominal tenderness. There is no guarding or rebound.   Musculoskeletal: Normal range of motion.         General: No swelling, deformity or signs of injury.      Right lower leg: No edema.      Left lower leg: No edema.    Skin:     General: Skin is warm.      Capillary Refill: Capillary refill takes less than 2 seconds.      Coloration: Skin is not jaundiced.      Findings: No bruising or erythema.   Neurological:      General: No focal deficit present.      Mental Status: He is alert and oriented to person, place, and time. Mental status is at baseline.      Cranial Nerves: No cranial nerve deficit.      Sensory: No sensory deficit.      Motor: No weakness.   Psychiatric:         Mood and Affect: Mood normal.         Behavior: Behavior normal.         Thought Content: Thought content normal.           Labs:   Review    Imaging:   Review    Problem Representation:       * Altered mental status  Assessment & Plan  - Patient has been exhibiting erratic behavior intermittently since he was sent home after appendectomy on 8/15/2020  - This is his second admission after surgery  - Patient was DC yesterday 8/17 in good condition, no concerns about his mental status  - Per daughter he did very well in the afternoon, but started noticing very erratic behavior after 7-8 pm in the evening, then called EMS. He was combative and was given Versed and put on restraints.  - Patient aware of what was going on yesterday at home, he stated same story than his daughter, aware that he was acting very strangely.  - NO apparent stressors in the family per patient and daughter.  - B12 and TSH WNL, CT Head WNL  - Patient acting normally in patient, just stating anxiety from being here in the hospital  - No history of seizures or mental illness                        PLAN:  - VDRL  - HIV  - MRI Brain  - If Negative complete Psychiatric evaluation  - CTM      DKA (diabetic ketoacidoses) (HCC)  Assessment & Plan  - NO DKA    S/P laparoscopic appendectomy- (present on admission)  Assessment & Plan  - Laparoscopic appendectomy on 8/13/2020  - Appendix was ruptured  - Patient has a DAVID drain, very minimal output  - No signs of infection  - Denies fever, chills,  night sweats or redness at drain site, no pain or tenderness.  - Continue Augmenting as recommended by his Surgeon      Type 2 diabetes mellitus without complication (HCC)- (present on admission)  Assessment & Plan  - Hx of DM type 2  - CMP showed   - A1c 8/17/20 was 9.0  - At home takes Metformin and glimepiride  - Insuline SS  - Accu checks TID  - Hypoglycemia protocol  - Diabetic diet

## 2020-08-18 NOTE — SENIOR ADMIT NOTE
[facility] Internal Medicine  Senior Admission Note        Date of Service: 8/17/2020   Primary Team: [facility] Blue Team   Attending Physician: Paul Arellano M.D.   Code Status: Full Code       64-year-old male who returns to the ED with AMS. The patient was brought in from home by EMS after the patient's daughter called EMS as her father was behaving strangely and not making any sense. This is different from the patient's baseline prior to his recent hospitalization per his daughter's report. The patient is Kyrgyz speaking. The hospital's  service was used for the interview and exam. The patient is a poor historian and unable to provide any meaningful history. He denies fever, chills, headache, change in vision, abdominal pain, LUTS, chest pain, SOB, diarrhea, constipation and abnormal bleeding or bruising. PMH is significant for appendectomy and drain placement on 8/13/2020 for ruptured appendicitis.    The patient was administered Versed by EMS en route to the ED and noted to have low BP in the ED. His BP improved over time. He was administered a dose of Haldol 2 mg IV with improvement in AMS. He is being admitted for further evaluation and management, Psych consult and dementia workup has been recommended by the EDP.       Physical Exam:  Vitals:    08/17/20 2317 08/18/20 0113 08/18/20 0201 08/18/20 0310   BP: 137/75 129/69 160/91 142/94   Pulse: 89 85 86 88   Resp: 16 14 16 17   Temp:    36.4 °C (97.5 °F)   TempSrc:    Temporal   SpO2: 97% 94% 96% 92%   Weight:    62.3 kg (137 lb 5.6 oz)   Height:       General: NAD, not responding appropriately to questions  HEENT: NCAT, normal conjunctivae, dry mucous membranes   Heart: Regular rhythm, normal rate, no murmurs  Lungs: Normal work of breathing, no respiratory distress  Abdomen: Soft, non-tender, non-distended, no peritoneal signs, DAVID drain in place draining a small volume of serous fluid, surgical incisions remain stapled and well  approximated, no signs of SSTI on abdominal exam  Extremities: No cyanosis, no edema  Neuro: Alert, oriented x 2, no focal deficits  Skin: Warm, dry, intact, post op skin findings as above       Labs and Imaging:  BAL < 10  UDS positive for benzodiazepines   UA was negative for infection, positive for ketones and large glucose   8/18/2020 lipid panel shows , , HDL 25 and LDL 57    Lab Results   Component Value Date/Time    WBC 9.0 08/18/2020 03:14 AM    RBC 4.57 (L) 08/18/2020 03:14 AM    HEMOGLOBIN 14.1 08/18/2020 03:14 AM    HEMATOCRIT 40.6 (L) 08/18/2020 03:14 AM    MCV 88.8 08/18/2020 03:14 AM    MCH 30.9 08/18/2020 03:14 AM    MCHC 34.7 08/18/2020 03:14 AM    MPV 9.2 08/18/2020 03:14 AM    NEUTSPOLYS 65.70 08/18/2020 03:14 AM    LYMPHOCYTES 14.20 (L) 08/18/2020 03:14 AM    MONOCYTES 9.20 08/18/2020 03:14 AM    EOSINOPHILS 3.00 08/18/2020 03:14 AM    BASOPHILS 2.00 (H) 08/18/2020 03:14 AM    ANISOCYTOSIS 1+ 08/17/2020 10:55 PM      Lab Results   Component Value Date/Time    SODIUM 138 08/18/2020 03:14 AM    POTASSIUM 3.7 08/18/2020 03:14 AM    CHLORIDE 102 08/18/2020 03:14 AM    CO2 21 08/18/2020 03:14 AM    GLUCOSE 152 (H) 08/18/2020 03:14 AM    BUN 9 08/18/2020 03:14 AM    CREATININE 0.49 (L) 08/18/2020 03:14 AM      Lab Results   Component Value Date/Time    ALTSGPT 54 (H) 08/17/2020 10:55 PM    ASTSGOT 42 08/17/2020 10:55 PM    ALKPHOSPHAT 97 08/17/2020 10:55 PM    TBILIRUBIN 0.5 08/17/2020 10:55 PM    LIPASE 13 08/13/2020 08:00 AM    ALBUMIN 3.6 08/17/2020 10:55 PM    GLOBULIN 3.0 08/17/2020 10:55 PM    INR 0.94 08/13/2020 08:00 AM    MACROCYTOSIS 1+ 08/17/2020 10:55 PM     Lab Results   Component Value Date/Time    PROTHROMBTM 12.7 08/13/2020 08:00 AM    INR 0.94 08/13/2020 08:00 AM        CT-ABDOMEN-PELVIS WITH  8/18/2020 12:58 AM  Again seen surgical change consistent with recent appendectomy. A surgical drain is again seen extending into the surgical site. There is again seen some  inflammatory stranding in that area with mild wall thickening of the distal ileum. This appearance is similar to prior exam.  No evidence of bowel obstruction.  Fatty change of the liver.    CT-HEAD WITHOUT CONTRAST  8/18/2020 12:58 AM  No evidence of acute intracranial process. There is no evidence of mass or mass effect. CSF spaces are normal. There is no periventricular chronic small vessel ischemic change present. There is no intracranial hemorrhage seen. The calvarium is intact. There is no scalp injury. There is no evidence of sinus disease.      Impression and Plan:  Altered mental status; concern for underlying dementia  Ruptured appendix s.p. appendectomy and drain placement on 8/13/2020   Diabetic ketoacidosis; A1c  9.0 on 8/17/2020  History of alcohol abuse    Tele monitoring, continuous pulse ox   IVF replacement   Continue Amoxicillin as prescribed   Holding home metformin and glimepiride  SSI with Humalog while inpatient with hypoglycemia precautions   Close electrolyte monitoring and correction, maintain K > 4 and Mg > 2   Psych consult ordered for dementia workup, day team to follow up on this    Nutrition consulted for dietary evaluation and management   Atorvastatin dose decreased to 10 mg daily per results of repeat lipid panel   SW consulted for discharge planning       Please refer to the H&P note by Dr. Bashir for complete details.

## 2020-08-18 NOTE — PROGRESS NOTES
Triage officer note: 64-year-old male presented to the emergency department due to altered mental status.  Patient was discharged in the morning of 8/17, was on the resident service at that time.    [facility] IM resident Dr Blanchard to discuss the case with ERP Dr Gurrola and assume care of the patient.

## 2020-08-18 NOTE — ASSESSMENT & PLAN NOTE
- Hx of DM type 2  - 8/19 BMP   - A1c 8/17/20 was 9.0  - At home takes Metformin and glimepiride  - Insuline SS  - Accu checks TID  - Hypoglycemia protocol  - Diabetic diet  - Diabetic education  - Needs regular PCP follow ups

## 2020-08-18 NOTE — H&P
"History & Physical Note    Date of Admission: 8/18/2020  Admission Status: Observation-Outpatient  [facility] Team: [facility] Blue Team  Attending: Paul Arellano M.D.   Senior Resident: Dr. Bolaños  Intern: Dr. Bowman  Contact Number: [phone]    Chief Complaint: altered mental status    History of Present Illness (HPI):   Berlin is a 64 y.o. male with PMH Type II DM, ruptured appendicitis s/p laparoscopic appendectomy on 8/13 who presented to the ED on 8/17/2020 with altered mental status.      Most of the history is incoherent and the patient is a poor historian. Per chart review, patient was brought in by EMS due to daughter's concern for her father behaving in a strange fashion and speaking incoherently.    Patient states \"he was brought here and is tired.\"  He states \"he was playing and something happened a few nights ago.\" He \" had a game acting and drank water and was sleepy and and the police brought him here.  He could not sleep last night because he was afraid.\"    He states that he has \"foot pain and pain somewhere else due to surgery somewhere.\"  He denies fevers or chills.    EMS administered 8 mg of Versed, and he was also given 2mg of Haldol in the ED.    Review of Systems:   Review of Systems   Reason unable to perform ROS: altered mental status.   Constitutional: Negative for chills and fever.       Past Medical History:   Past Medical History was reviewed with patient.   has a past medical history of Corneal transplant status, Diabetes (HCC), Hyperlipidemia, and Hypertension.    Past Surgical History: Past Surgical History was reviewed with patient.   has a past surgical history that includes toe arthroplasty and pr lap,appendectomy (N/A, 8/13/2020).    Medications: Medications have been reviewed with patient.  Prior to Admission Medications   Prescriptions Last Dose Informant Patient Reported? Taking?   amoxicillin-clavulanate (AUGMENTIN) 875-125 MG Tab unknown at unknown  No No   Sig: Take 1 " Tablet by mouth every 12 hours for 7 days.   aspirin EC (ECOTRIN) 81 MG Tablet Delayed Response unknown at unknown Patient's Home Pharmacy Yes No   Sig: Take 81 mg by mouth every day.   atorvastatin (LIPITOR) 40 MG Tab unknown at unknown Patient's Home Pharmacy Yes No   Sig: Take 40 mg by mouth every day.   enalapril (VASOTEC) 2.5 MG Tab unknown at unknown  No No   Sig: Take 1 Tablet by mouth every day.   glimepiride (AMARYL) 4 MG Tab unknown at unknown Patient's Home Pharmacy Yes No   Sig: Take 4 mg by mouth 2 Times a Day.   metFORMIN (GLUCOPHAGE) 500 MG Tab unknown at unknown Patient's Home Pharmacy Yes No   Sig: Take 500 mg by mouth 2 times a day, with meals.      Facility-Administered Medications: None        Allergies: Allergies have been reviewed with patient.  No Known Allergies    Family History:   family history includes Alcohol/Drug in his father; Diabetes in his father and sister; No Known Problems in his sister.     Social History:   Tobacco: denies  Alcohol: denies  Recreational drugs (illegal and prescription):  denies   Employment: unable to obtain due to AMS  Activity Level: unable to obtain due to AMS   Living situation:  Lives at home with daughter  Recent travel:  denies  Primary Care Provider: reviewed Daja Blevins M.D.  Other (stressors, spirituality, exposures):  unable to obtain due to AMS    Physical Exam:   Vitals:  Temp:  [36.1 °C (97 °F)-36.4 °C (97.5 °F)] 36.4 °C (97.5 °F)  Pulse:  [79-89] 88  Resp:  [14-19] 17  BP: ()/(65-97) 142/94  SpO2:  [88 %-98 %] 92 %    Physical Exam  Constitutional:       General: He is not in acute distress.     Appearance: Normal appearance. He is not ill-appearing.   HENT:      Head: Normocephalic and atraumatic.      Nose: Nose normal. No rhinorrhea.      Mouth/Throat:      Mouth: Mucous membranes are dry.      Pharynx: Oropharynx is clear.   Eyes:      General:         Right eye: No discharge.         Left eye: No discharge.       Conjunctiva/sclera: Conjunctivae normal.      Pupils: Pupils are equal, round, and reactive to light.   Cardiovascular:      Rate and Rhythm: Normal rate and regular rhythm.      Heart sounds: No murmur. No gallop.    Pulmonary:      Effort: Pulmonary effort is normal. No respiratory distress.      Breath sounds: No stridor. No wheezing or rales.   Abdominal:      General: Bowel sounds are normal. There is no distension.      Palpations: Abdomen is soft.      Tenderness: There is no abdominal tenderness.   Musculoskeletal:      Right lower leg: No edema.      Left lower leg: No edema.   Skin:     General: Skin is warm.      Comments: DAVID drain in place in LLQ draining a small volume of serosanguinous fluid, surgical incisions c/d/i and remain stapled and well approximated, no signs of SSTI on abdominal exam   Neurological:      General: No focal deficit present.      Mental Status: He is alert. He is disoriented.      Comments: AO x 2 (self, date). Confused.   Psychiatric:         Mood and Affect: Mood normal.         Labs:   See results    EKG: Per my read, SR with T wave inversion in lead III.    Imaging:   See results    Previous Data Review: reviewed    Problem Representation:    Mr. Benigno Hurtado is a 64 y.o. male with PMH of ruptured appendicitis s/p laparoscopic appendectomy on 8/13 who presented to the ED on 8/17/2020 with altered mental status.      * Altered mental status  Assessment & Plan  Etiology unclear, however possibly has dementia with sundowning though syphilis also needs to be ruled out. Presenting on 8/17 again with AMS/confusion. Initially presented 8/15 with AMS/confusion post laparoscopic appendectomy on 8/13, then improved and discharged on 8/17. CT abdomen and CT head within normal limits, chest x-ray also wnl. TSH and B12 wnl.   - treponema pallidum ab  - bedside swallow eval and advance diet as tolerated  - encourage ambulation  - psych consult ordered for dementia workup. Day team to  follow up on this  -  consulted for discharge planning    DKA (diabetic ketoacidoses) ([facility])  Assessment & Plan  Improving. Diabetes not well controlled, A1c 9.0 on admission. Has history of non-insulin-dependent diabetes mellitus on metformin and glimepiride. On presentation blood sugars were slightly elevated in the 200s with mild elevation in beta hydroxybutyrate and anion gap. Anion gap now improved after received fluids.  - oral hypoglycemics held  - tele monitoring  - Continue IVF  - Continue SSI with Humalog with hypoglycemia precautions in place  - Continue BG checks   - monitor electrolytes and replete, particularly K with goal of >4  - atorvastatin dose decreased from 40mg qd to 10mg qd given lipid panel within target and concern for statin causing glucose intolerance  - nutrition consult ordered for dietary eval and management    S/P laparoscopic appendectomy- (present on admission)  Assessment & Plan  Patient underwent laparoscopic appendectomy due to ruptured appendix, has a DAVID drain in place done on 8/13. Does not appear to be source of his AMS, as he does not appear septic given benign abdominal exam, afebrile, no leukocytosis.  - DAVID drain output   - Continue Augmentin     Code status: Full  DVT ppx: lovenox  GI ppx: none  Abx: Augmentin

## 2020-08-19 ENCOUNTER — APPOINTMENT (OUTPATIENT)
Dept: RADIOLOGY | Facility: MEDICAL CENTER | Age: 65
End: 2020-08-19
Attending: STUDENT IN AN ORGANIZED HEALTH CARE EDUCATION/TRAINING PROGRAM
Payer: MEDICARE

## 2020-08-19 VITALS
HEART RATE: 76 BPM | BODY MASS INDEX: 20.38 KG/M2 | HEIGHT: 69 IN | TEMPERATURE: 97.7 F | OXYGEN SATURATION: 95 % | SYSTOLIC BLOOD PRESSURE: 145 MMHG | WEIGHT: 137.57 LBS | DIASTOLIC BLOOD PRESSURE: 85 MMHG | RESPIRATION RATE: 16 BRPM

## 2020-08-19 PROBLEM — R41.82 ALTERED MENTAL STATUS: Status: RESOLVED | Noted: 2020-08-16 | Resolved: 2020-08-19

## 2020-08-19 LAB
ANION GAP SERPL CALC-SCNC: 14 MMOL/L (ref 7–16)
BUN SERPL-MCNC: 12 MG/DL (ref 8–22)
CALCIUM SERPL-MCNC: 8.8 MG/DL (ref 8.5–10.5)
CHLORIDE SERPL-SCNC: 105 MMOL/L (ref 96–112)
CO2 SERPL-SCNC: 22 MMOL/L (ref 20–33)
CREAT SERPL-MCNC: 0.56 MG/DL (ref 0.5–1.4)
EKG IMPRESSION: NORMAL
GLUCOSE BLD-MCNC: 131 MG/DL (ref 65–99)
GLUCOSE BLD-MCNC: 190 MG/DL (ref 65–99)
GLUCOSE SERPL-MCNC: 131 MG/DL (ref 65–99)
POTASSIUM SERPL-SCNC: 4.1 MMOL/L (ref 3.6–5.5)
SODIUM SERPL-SCNC: 141 MMOL/L (ref 135–145)

## 2020-08-19 PROCEDURE — 700102 HCHG RX REV CODE 250 W/ 637 OVERRIDE(OP): Performed by: SURGERY

## 2020-08-19 PROCEDURE — A9270 NON-COVERED ITEM OR SERVICE: HCPCS | Performed by: SURGERY

## 2020-08-19 PROCEDURE — 99217 PR OBSERVATION CARE DISCHARGE: CPT | Mod: GC | Performed by: INTERNAL MEDICINE

## 2020-08-19 PROCEDURE — 700102 HCHG RX REV CODE 250 W/ 637 OVERRIDE(OP): Performed by: STUDENT IN AN ORGANIZED HEALTH CARE EDUCATION/TRAINING PROGRAM

## 2020-08-19 PROCEDURE — 82962 GLUCOSE BLOOD TEST: CPT

## 2020-08-19 PROCEDURE — 80048 BASIC METABOLIC PNL TOTAL CA: CPT

## 2020-08-19 PROCEDURE — A9270 NON-COVERED ITEM OR SERVICE: HCPCS | Performed by: STUDENT IN AN ORGANIZED HEALTH CARE EDUCATION/TRAINING PROGRAM

## 2020-08-19 PROCEDURE — 700105 HCHG RX REV CODE 258: Performed by: STUDENT IN AN ORGANIZED HEALTH CARE EDUCATION/TRAINING PROGRAM

## 2020-08-19 PROCEDURE — 93010 ELECTROCARDIOGRAM REPORT: CPT | Performed by: INTERNAL MEDICINE

## 2020-08-19 PROCEDURE — 36415 COLL VENOUS BLD VENIPUNCTURE: CPT

## 2020-08-19 PROCEDURE — 96372 THER/PROPH/DIAG INJ SC/IM: CPT

## 2020-08-19 PROCEDURE — G0378 HOSPITAL OBSERVATION PER HR: HCPCS

## 2020-08-19 PROCEDURE — 70551 MRI BRAIN STEM W/O DYE: CPT | Mod: ME

## 2020-08-19 PROCEDURE — 93005 ELECTROCARDIOGRAM TRACING: CPT | Performed by: STUDENT IN AN ORGANIZED HEALTH CARE EDUCATION/TRAINING PROGRAM

## 2020-08-19 PROCEDURE — 700111 HCHG RX REV CODE 636 W/ 250 OVERRIDE (IP): Performed by: STUDENT IN AN ORGANIZED HEALTH CARE EDUCATION/TRAINING PROGRAM

## 2020-08-19 RX ORDER — RISPERIDONE 0.5 MG/1
0.5 TABLET ORAL EVERY EVENING
Qty: 60 TAB | Refills: 0 | Status: SHIPPED | OUTPATIENT
Start: 2020-08-20

## 2020-08-19 RX ORDER — CEPHALEXIN 500 MG/1
500 CAPSULE ORAL EVERY 6 HOURS
Qty: 18 CAP | Refills: 0 | Status: SHIPPED | OUTPATIENT
Start: 2020-08-19 | End: 2020-08-23

## 2020-08-19 RX ORDER — METRONIDAZOLE 500 MG/1
500 TABLET ORAL EVERY 8 HOURS
Qty: 5 TAB | Refills: 0 | Status: SHIPPED | OUTPATIENT
Start: 2020-08-19 | End: 2020-08-20

## 2020-08-19 RX ORDER — ESCITALOPRAM OXALATE 10 MG/1
10 TABLET ORAL DAILY
Qty: 30 TAB | Refills: 0 | Status: SHIPPED | OUTPATIENT
Start: 2020-08-20

## 2020-08-19 RX ORDER — ESCITALOPRAM OXALATE 10 MG/1
10 TABLET ORAL DAILY
Status: DISCONTINUED | OUTPATIENT
Start: 2020-08-19 | End: 2020-08-19 | Stop reason: HOSPADM

## 2020-08-19 RX ORDER — METRONIDAZOLE 500 MG/1
500 TABLET ORAL EVERY 8 HOURS
Status: DISCONTINUED | OUTPATIENT
Start: 2020-08-19 | End: 2020-08-19 | Stop reason: HOSPADM

## 2020-08-19 RX ORDER — CEPHALEXIN 500 MG/1
500 CAPSULE ORAL EVERY 6 HOURS
Status: DISCONTINUED | OUTPATIENT
Start: 2020-08-19 | End: 2020-08-19 | Stop reason: HOSPADM

## 2020-08-19 RX ADMIN — DOCUSATE SODIUM 50 MG AND SENNOSIDES 8.6 MG 2 TABLET: 8.6; 5 TABLET, FILM COATED ORAL at 04:56

## 2020-08-19 RX ADMIN — ATORVASTATIN CALCIUM 10 MG: 10 TABLET, FILM COATED ORAL at 04:56

## 2020-08-19 RX ADMIN — AMOXICILLIN AND CLAVULANATE POTASSIUM 1 TABLET: 875; 125 TABLET, FILM COATED ORAL at 04:56

## 2020-08-19 RX ADMIN — RISPERIDONE 0.5 MG: 0.5 TABLET ORAL at 15:47

## 2020-08-19 RX ADMIN — ASPIRIN 81 MG: 81 TABLET, COATED ORAL at 04:56

## 2020-08-19 RX ADMIN — METRONIDAZOLE 500 MG: 500 TABLET ORAL at 13:46

## 2020-08-19 RX ADMIN — SODIUM CHLORIDE, POTASSIUM CHLORIDE, SODIUM LACTATE AND CALCIUM CHLORIDE: 600; 310; 30; 20 INJECTION, SOLUTION INTRAVENOUS at 02:30

## 2020-08-19 RX ADMIN — ENOXAPARIN SODIUM 40 MG: 40 INJECTION SUBCUTANEOUS at 04:56

## 2020-08-19 RX ADMIN — INSULIN LISPRO 2 UNITS: 100 INJECTION, SOLUTION INTRAVENOUS; SUBCUTANEOUS at 12:18

## 2020-08-19 RX ADMIN — CEPHALEXIN 500 MG: 500 CAPSULE ORAL at 15:47

## 2020-08-19 RX ADMIN — ENALAPRIL MALEATE 2.5 MG: 2.5 TABLET ORAL at 04:56

## 2020-08-19 ASSESSMENT — ENCOUNTER SYMPTOMS
HEADACHES: 0
FLANK PAIN: 0
NAUSEA: 0
SPEECH CHANGE: 0
SHORTNESS OF BREATH: 0
NERVOUS/ANXIOUS: 1
HEARTBURN: 0
DEPRESSION: 0
FEVER: 0
DIAPHORESIS: 0
BLURRED VISION: 0
TINGLING: 0
INSOMNIA: 1
SPUTUM PRODUCTION: 0
CHILLS: 0
BACK PAIN: 0
COUGH: 0
FOCAL WEAKNESS: 0
HALLUCINATIONS: 0
SORE THROAT: 0
DIZZINESS: 0
HEMOPTYSIS: 0
MYALGIAS: 0
ORTHOPNEA: 0
CLAUDICATION: 0
VOMITING: 0
BRUISES/BLEEDS EASILY: 0
DOUBLE VISION: 0
SENSORY CHANGE: 0
ABDOMINAL PAIN: 0
PALPITATIONS: 0
PHOTOPHOBIA: 0
NECK PAIN: 0

## 2020-08-19 NOTE — PROGRESS NOTES
Confusion and anxiety rarely reported with augmentin   Will change to keflex and flagyl to complete Po antibiotic course

## 2020-08-19 NOTE — PROGRESS NOTES
Pt dc'd in stable and satisfactory condition. IV and monitor removed; monitor room notified. Pt left unit via walking with RN and daughter. Personal belongings with pt when leaving unit. Pt given discharge instructions prior to leaving unit including where to  prescriptions and when to follow-up; verbalizes understanding. Copy of discharge instructions with pt and in the chart.

## 2020-08-19 NOTE — CARE PLAN
Problem: Fluid Volume:  Goal: Will maintain balanced intake and output  Outcome: PROGRESSING AS EXPECTED     Problem: Communication  Goal: The ability to communicate needs accurately and effectively will improve  Outcome: PROGRESSING AS EXPECTED     Problem: Safety  Goal: Will remain free from injury  Outcome: PROGRESSING AS EXPECTED  Goal: Will remain free from falls  Outcome: PROGRESSING AS EXPECTED     Problem: Infection  Goal: Will remain free from infection  Outcome: PROGRESSING AS EXPECTED     Problem: Bowel/Gastric:  Goal: Normal bowel function is maintained or improved  Outcome: PROGRESSING AS EXPECTED     Problem: Knowledge Deficit  Goal: Knowledge of disease process/condition, treatment plan, diagnostic tests, and medications will improve  Outcome: PROGRESSING AS EXPECTED

## 2020-08-19 NOTE — CARE PLAN
Problem: Fluid Volume:  Goal: Will maintain balanced intake and output  8/19/2020 1541 by Aster Flores R.N.  Outcome: MET  8/19/2020 0935 by Aster Flores R.N.  Outcome: PROGRESSING AS EXPECTED     Problem: Communication  Goal: The ability to communicate needs accurately and effectively will improve  8/19/2020 1541 by Aster Flores R.N.  Outcome: MET  8/19/2020 0935 by Aster Flores R.N.  Outcome: PROGRESSING AS EXPECTED     Problem: Safety  Goal: Will remain free from injury  8/19/2020 1541 by Aster Flores R.N.  Outcome: MET  8/19/2020 0935 by Aster Flores R.N.  Outcome: PROGRESSING AS EXPECTED  Goal: Will remain free from falls  8/19/2020 1541 by Aster Flores R.N.  Outcome: MET  8/19/2020 0935 by Aster Flores R.N.  Outcome: PROGRESSING AS EXPECTED     Problem: Infection  Goal: Will remain free from infection  Outcome: MET     Problem: Venous Thromboembolism (VTW)/Deep Vein Thrombosis (DVT) Prevention:  Goal: Patient will participate in Venous Thrombosis (VTE)/Deep Vein Thrombosis (DVT)Prevention Measures  Outcome: MET     Problem: Bowel/Gastric:  Goal: Normal bowel function is maintained or improved  8/19/2020 1541 by Aster Flores R.N.  Outcome: MET  8/19/2020 0935 by Aster Flores R.N.  Outcome: PROGRESSING AS EXPECTED  Goal: Will not experience complications related to bowel motility  Outcome: MET     Problem: Knowledge Deficit  Goal: Knowledge of disease process/condition, treatment plan, diagnostic tests, and medications will improve  Outcome: MET  Goal: Knowledge of the prescribed therapeutic regimen will improve  Outcome: MET     Problem: Discharge Barriers/Planning  Goal: Patient's continuum of care needs will be met  Outcome: MET     Problem: Respiratory:  Goal: Respiratory status will improve  Outcome: MET

## 2020-08-19 NOTE — PROGRESS NOTES
Daily Progress Note:     Date of Service: 8/19/2020  Primary Team: [employer] Blue Team   Attending: Paul Arellano M.D.   Senior Resident: Dr. Bolaños  Intern: Dr. Bowman  Contact:  [phone]    Chief Complaint:   Erratic combative Behavior at home    Subjective:   No acute events overnight reported  Patient states feeling well, only states anxiety for being in the hospital.  Denies any other symptoms, no thoughts of hurting anyone.  Tolerating well oral intake, denied any abdominal pain.  This morning expressed concerns about dreaming driving a car on darkness, at this point to me it is very clear that patient needs Psych to be involved in the case.   MRI Brain:  1.  No acute abnormality.  2.  Mild cerebral atrophy.  3.  Mild chronic microvascular ischemic disease.  4.  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     Patient will be discharged home in good medical condition, he has an appointment follow up with surgery next Friday. He will also need to establish care with PCP and follow up with Psych.      Consultants/Specialty:  None    Review of Systems:      Review of Systems   Constitutional: Negative for chills, diaphoresis, fever and malaise/fatigue.   HENT: Negative for congestion, nosebleeds, sore throat and tinnitus.    Eyes: Negative for blurred vision, double vision and photophobia.   Respiratory: Negative for cough, hemoptysis, sputum production and shortness of breath.    Cardiovascular: Negative for chest pain, palpitations, orthopnea, claudication and leg swelling.   Gastrointestinal: Negative for abdominal pain, heartburn, nausea and vomiting.   Genitourinary: Negative for dysuria, flank pain, frequency, hematuria and urgency.   Musculoskeletal: Negative for back pain, joint pain, myalgias and neck pain.   Skin: Negative for itching and rash.   Neurological: Negative for dizziness, tingling, sensory change, speech change, focal weakness and headaches.    Endo/Heme/Allergies: Negative for environmental allergies. Does not bruise/bleed easily.   Psychiatric/Behavioral: Negative for depression, hallucinations and suicidal ideas. The patient is nervous/anxious and has insomnia.        Objective Data:   Physical Exam:   Vitals:   Temp:  [36.1 °C (96.9 °F)-37.1 °C (98.8 °F)] 36.2 °C (97.2 °F)  Pulse:  [65-83] 69  Resp:  [16-18] 16  BP: (112-163)/(68-93) 117/68  SpO2:  [92 %-98 %] 92 %    Physical Exam  Vitals signs reviewed.   Constitutional:       General: He is not in acute distress.     Appearance: Normal appearance. He is not ill-appearing.   HENT:      Head: Normocephalic and atraumatic.      Nose: Nose normal. No congestion or rhinorrhea.      Mouth/Throat:      Mouth: Mucous membranes are moist.      Pharynx: Oropharynx is clear. No oropharyngeal exudate or posterior oropharyngeal erythema.   Eyes:      General: No scleral icterus.     Extraocular Movements: Extraocular movements intact.      Conjunctiva/sclera: Conjunctivae normal.      Pupils: Pupils are equal, round, and reactive to light.   Neck:      Musculoskeletal: Normal range of motion and neck supple. No neck rigidity or muscular tenderness.   Cardiovascular:      Rate and Rhythm: Normal rate and regular rhythm.      Pulses: Normal pulses.      Heart sounds: Normal heart sounds. No murmur.   Pulmonary:      Effort: Pulmonary effort is normal. No respiratory distress.      Breath sounds: Normal breath sounds. No wheezing or rhonchi.   Abdominal:      General: Bowel sounds are normal. There is no distension.      Palpations: Abdomen is soft. There is no mass.      Tenderness: There is no abdominal tenderness. There is no guarding or rebound.   Musculoskeletal: Normal range of motion.         General: No swelling.      Right lower leg: No edema.      Left lower leg: No edema.   Skin:     General: Skin is warm.      Capillary Refill: Capillary refill takes less than 2 seconds.      Coloration: Skin is not  jaundiced.      Findings: No bruising or erythema.   Neurological:      General: No focal deficit present.      Mental Status: He is alert and oriented to person, place, and time. Mental status is at baseline.      Cranial Nerves: No cranial nerve deficit.   Psychiatric:         Mood and Affect: Mood normal.         Thought Content: Thought content normal.         Judgment: Judgment normal.           Labs:   Review    Imaging:   Review    Problem Representation:       * Altered mental status  Assessment & Plan  - Patient has been exhibiting erratic behavior intermittently since he was sent home after appendectomy on 8/15/2020  - This is his second admission after surgery  - Patient was DC yesterday 8/17 in good condition, no concerns about his mental status  - Per daughter he did very well in the afternoon, but started noticing very erratic behavior after 7-8 pm in the evening, then called EMS. He was combative and was given Versed and put on restraints.  - Patient aware of what was going on yesterday at home, he stated same story than his daughter, aware that he was acting very strangely.  - NO apparent stressors in the family per patient and daughter.  - B12 and TSH WNL, HIV and Shyphilis Negative.  - CT head and Abdomen Negative  - Patient acting normally in patient, just stating anxiety from being here in the hospital  - No history of seizures or mental illness  MRI Brain:  1.  No acute abnormality.  2.  Mild cerebral atrophy.  3.  Mild chronic microvascular ischemic disease.   4.  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                           PLAN:    - DC Home  - PCP follow up  - Consider Neurology referral  - CT      DKA (diabetic ketoacidoses) ([facility], [facility])  Assessment & Plan  - NO DKA    S/P laparoscopic appendectomy- (present on admission)  Assessment & Plan  - Laparoscopic appendectomy on 8/13/2020  - Appendix was ruptured  - DAVID drained removed on 8/18 evening  - No signs of infection  -  Denies fever, chills, night sweats or redness at drain site, no pain or tenderness.  - Surgeon changed his Abx regimen to    Keflex 500mg Q6 hrs till 8/23    Metronidazole 500 mg Q8 hrs till 8/20  - Follow up with Surgery out patient  - Establish care and regular PCP follow up      Type 2 diabetes mellitus without complication (HCC)- (present on admission)  Assessment & Plan  - Hx of DM type 2  - 8/19 BMP   - A1c 8/17/20 was 9.0  - At home takes Metformin and glimepiride  - Insuline SS  - Accu checks TID  - Hypoglycemia protocol  - Diabetic diet  - Diabetic education  - Needs regular PCP follow ups

## 2020-08-19 NOTE — PROGRESS NOTES
Bedside report received from day shift RN. Pt resting in bed. Call light in reach and side rails up. POC discussed with pt, all questions answered. No requests at this time. Assuming pt care.

## 2020-08-19 NOTE — DISCHARGE SUMMARY
Discharge Summary    Date of Admission: 8/17/2020  Date of Discharge: 8/19/2020  Discharging Attending: Paul Arellano M.D.   Discharging Senior Resident: Dr. Bolaños  Discharging Intern: Dr. Bowman    CHIEF COMPLAINT ON ADMISSION  Chief Complaint   Patient presents with   • ALOC       Reason for Admission  Agitation, altered mental status.    Admission Date  8/17/2020    CODE STATUS  Full Code    HPI & HOSPITAL COURSE  63-year-old male with past medical history of type 2 diabetes mellitus, hypertension, hyperlipidemia, alcohol use, laparoscopic appendectomy done at [facility] for a ruptured appendix was brought in again for altered mental status.  Patient was discharged on the day of admission after being observed for altered mental status with no remarkable work-up findings.  He was stable during the previous hospitalization and was discharged, however on returning home patient again started to have erratic behavior, agitation for which his daughter brought him back to the hospital.  Labs were predominantly unremarkable, repeat CT abdomen and CT head did not show any acute abnormalities.  Patient was admitted for observation.    He remained stable throughout hospitalization.  Syphilis, HIV, TSH, B12 were checked which were within normal limits.  MRI brain was done which did not show any acute abnormality as well.  He was started on risperidone and Lexapro.  No acute medical cause identified. Discussed with daughter and patient that he will need to follow-up with PCP and if symptoms persist will need psychiatric evaluation.       Therefore, he is discharged in fair and stable condition to home with close outpatient follow-up.    The patient met 2-midnight criteria for an inpatient stay at the time of discharge.    PHYSICAL EXAM ON DISCHARGE  Temp:  [36.1 °C (96.9 °F)-37.1 °C (98.8 °F)] 36.5 °C (97.7 °F)  Pulse:  [65-76] 76  Resp:  [16-18] 16  BP: (112-163)/(68-93) 145/85  SpO2:  [92 %-98 %] 95  %    Physical Exam  HENT:      Head: Normocephalic and atraumatic.      Nose: Nose normal. No congestion or rhinorrhea.      Mouth/Throat:      Mouth: Mucous membranes are moist.      Pharynx: Oropharynx is clear. No oropharyngeal exudate or posterior oropharyngeal erythema.   Eyes:      General: No scleral icterus.     Extraocular Movements: Extraocular movements intact.      Conjunctiva/sclera: Conjunctivae normal.      Pupils: Pupils are equal, round, and reactive to light.   Neck:      Musculoskeletal: Normal range of motion and neck supple. No neck rigidity or muscular tenderness.   Cardiovascular:      Rate and Rhythm: Normal rate and regular rhythm.      Pulses: Normal pulses.      Heart sounds: Normal heart sounds. No murmur.   Pulmonary:      Effort: Pulmonary effort is normal. No respiratory distress.      Breath sounds: Normal breath sounds. No wheezing or rhonchi.   Abdominal:      General: Bowel sounds are normal. There is no distension.      Palpations: Abdomen is soft. There is no mass.      Tenderness: There is no abdominal tenderness. There is no guarding or rebound.   Musculoskeletal: Normal range of motion.         General: No swelling.      Right lower leg: No edema.      Left lower leg: No edema.   Skin:     General: Skin is warm.      Capillary Refill: Capillary refill takes less than 2 seconds.      Coloration: Skin is not jaundiced.      Findings: No bruising or erythema.   Neurological:      General: No focal deficit present.      Mental Status: He is alert and oriented to person, place, and time. Mental status is at baseline.      Cranial Nerves: No cranial nerve deficit.   Psychiatric:         Mood and Affect: Mood normal.         Thought Content: Thought content normal.         Judgment: Judgment normal    Discharge Date  8/19/2020    FOLLOW UP ITEMS POST DISCHARGE  -f/u with pcp, if symptoms persist will need psychiatry evaluation  -complete antibiotic course, follow up with surgeon as  scheduled.    DISCHARGE DIAGNOSES  Principal Problem (Resolved):    Altered mental status POA: Unknown  Active Problems:    Type 2 diabetes mellitus without complication (HCC) POA: Yes    S/P laparoscopic appendectomy POA: Yes    DKA (diabetic ketoacidoses) (HCC) POA: Unknown      FOLLOW UP  No future appointments.  Daja Blevins M.D.  [address]  [place] [address]  [phone]    In 1 week      Will Rowan M.D.  [address] [facility] [address]  [place] [address]  [phone]    In 1 week        MEDICATIONS ON DISCHARGE     Medication List      START taking these medications      Instructions   cephALEXin 500 MG Caps  Commonly known as: KEFLEX  Notes to patient: Next dose due at 10:00pm   Take 1 Cap by mouth every 6 hours for 4 days.  Dose: 500 mg     escitalopram 10 MG Tabs  Start taking on: August 20, 2020  Commonly known as: LEXAPRO   Take 1 Tab by mouth every day.  Dose: 10 mg     metroNIDAZOLE 500 MG Tabs  Commonly known as: FLAGYL  Notes to patient: Next dose due at 10:00pm   Take 1 Tab by mouth every 8 hours for 1 day.  Dose: 500 mg     risperiDONE 0.5 MG Tabs  Start taking on: August 20, 2020  Commonly known as: RISPERDAL   Take 1 Tab by mouth every evening.  Dose: 0.5 mg        CONTINUE taking these medications      Instructions   aspirin EC 81 MG Tbec  Commonly known as: ECOTRIN  Notes to patient: Start on August 20th   Take 81 mg by mouth every day.  Dose: 81 mg     atorvastatin 40 MG Tabs  Commonly known as: LIPITOR  Notes to patient: Start on August 20th   Take 40 mg by mouth every day.  Dose: 40 mg     enalapril 2.5 MG Tabs  Commonly known as: VASOTEC  Notes to patient: Start on August 20th   Take 1 Tablet by mouth every day.  Dose: 2.5 mg     glimepiride 4 MG Tabs  Commonly known as: AMARYL  Notes to patient: Start evening of August 19th   Take 4 mg by mouth 2 Times a Day.  Dose: 4 mg     metFORMIN 500 MG Tabs  Commonly known as: GLUCOPHAGE  Notes to patient: Start evening of August  19th   Take 500 mg by mouth 2 times a day, with meals.  Dose: 500 mg        STOP taking these medications    amoxicillin-clavulanate 875-125 MG Tabs  Commonly known as: AUGMENTIN            Allergies  No Known Allergies    DIET  Orders Placed This Encounter   Procedures   • Diet Order Diabetic     Standing Status:   Standing     Number of Occurrences:   1     Order Specific Question:   Diet:     Answer:   Diabetic [3]       ACTIVITY  As tolerated    CONSULTATIONS  none    PROCEDURES  none

## 2020-08-19 NOTE — CARE PLAN
Problem: Fluid Volume:  Goal: Will maintain balanced intake and output  Outcome: PROGRESSING AS EXPECTED     Problem: Communication  Goal: The ability to communicate needs accurately and effectively will improve  Outcome: PROGRESSING AS EXPECTED     Problem: Safety  Goal: Will remain free from injury  Outcome: PROGRESSING AS EXPECTED  Goal: Will remain free from falls  Outcome: PROGRESSING AS EXPECTED     Problem: Bowel/Gastric:  Goal: Normal bowel function is maintained or improved  Outcome: PROGRESSING AS EXPECTED

## 2020-08-19 NOTE — DISCHARGE INSTRUCTIONS
Discharge Instructions    Discharged to home by car with relative. Discharged via wheelchair, hospital escort: Yes.  Special equipment needed: Not Applicable    Be sure to schedule a follow-up appointment with your primary care doctor or any specialists as instructed.     Discharge Plan:   Diet Plan: Discussed  Activity Level: Discussed  Confirmed Follow up Appointment: Appointment Scheduled  Confirmed Symptoms Management: Discussed  Medication Reconciliation Updated: Yes    I understand that a diet low in cholesterol, fat, and sodium is recommended for good health. Unless I have been given specific instructions below for another diet, I accept this instruction as my diet prescription.   Other diet: Diabetic    Special Instructions: None    · Is patient discharged on Warfarin / Coumadin?   No     Depression / Suicide Risk    As you are discharged from this [facility] facility, it is important to learn how to keep safe from harming yourself.    Recognize the warning signs:  · Abrupt changes in personality, positive or negative- including increase in energy   · Giving away possessions  · Change in eating patterns- significant weight changes-  positive or negative  · Change in sleeping patterns- unable to sleep or sleeping all the time   · Unwillingness or inability to communicate  · Depression  · Unusual sadness, discouragement and loneliness  · Talk of wanting to die  · Neglect of personal appearance   · Rebelliousness- reckless behavior  · Withdrawal from people/activities they love  · Confusion- inability to concentrate     If you or a loved one observes any of these behaviors or has concerns about self-harm, here's what you can do:  · Talk about it- your feelings and reasons for harming yourself  · Remove any means that you might use to hurt yourself (examples: pills, rope, extension cords, firearm)  · Get professional help from the community (Mental Health, Substance Abuse, psychological counseling)  · Do not  be alone:Call your Safe Contact- someone whom you trust who will be there for you.  · Call your local CRISIS HOTLINE 335-0970 or 189-682-0573  · Call your local Children's Mobile Crisis Response Team [place] (953) 357-3377 or www.DriveFactor  · Call the toll free National Suicide Prevention Hotlines   · National Suicide Prevention Lifeline 216-601-XABR (4051)  · National [employer] Line Network 800-SUICIDE (252-5994)

## 2020-08-20 LAB
BACTERIA UR CULT: NORMAL
SIGNIFICANT IND 70042: NORMAL
SITE SITE: NORMAL
SOURCE SOURCE: NORMAL

## 2020-08-21 LAB
BACTERIA BLD CULT: NORMAL
BACTERIA BLD CULT: NORMAL
SIGNIFICANT IND 70042: NORMAL
SIGNIFICANT IND 70042: NORMAL
SITE SITE: NORMAL
SITE SITE: NORMAL
SOURCE SOURCE: NORMAL
SOURCE SOURCE: NORMAL

## 2020-08-27 NOTE — OR SURGEON
Immediate Post OP Note    PreOp Diagnosis: ruptured appy with abscess  PostOp Diagnosis: same     Procedure(s):  APPENDECTOMY, LAPAROSCOPIC drainage peritoneal abscesses - Wound Class: Clean Contaminated    Surgeon(s):  Will Rowan M.D.    Anesthesiologist/Type of Anesthesia:  Anesthesiologist: Alina Torres M.D./General    Surgical Staff:  Circulator: Harriett Valenzuela R.N.; Yolanda Lua R.N.  Relief Circulator: Asia Alves R.N.  Scrub Person: Mike Ross; Loco Santana    Specimens removed if any:  ID Type Source Tests Collected by Time Destination   1 : culture from appendix  Tissue Appendix PATHOLOGY SPECIMEN, AEROBIC/ANAEROBIC CULTURE (SURGERY) Will Rowan M.D. 8/13/2020  7:18 PM    A : Appendix  Tissue Appendix PATHOLOGY SPECIMEN Will Rowan M.D. 8/13/2020  6:55 PM        Estimated Blood Loss: 40    Findings: ruptured appy with intraloop sb abscess     Complications: 0        8/13/2020 7:26 PM Will Rowan M.D.     declines

## 2020-08-28 NOTE — DISCHARGE SUMMARY
Discharge Summary    CHIEF COMPLAINT ON ADMISSION  Chief Complaint   Patient presents with   • Abdominal Pain     started Monday   • N/V     reports one episode of vomiting on Monday       Reason for Admission  Abdominal Pain, Fever     Admission Date  8/13/2020    CODE STATUS  Prior    HPI & HOSPITAL COURSE  The patient is 64 years of age.  Presents to the emergency room with   right lower quadrant abdominal pain of 3 days' duration.  He indicated he   fell ill on Monday.  He has had increasing abdominal distention and localizing   pain in the right lower quadrant.  He claims to have had fever, but no   chills.  Patient has been able to eat without significant gastrointestinal   symptoms.  He has not had any chronic bowel dysfunction and specifically he   has not had diarrhea or hematochezia.  He has not had problems with his   digestion previously.  He has been urinating reasonably well without dysuria.    He has not had viral symptoms.  He was found to have significant right lower   quadrant abdominal tenderness.  His white count was normal.  A CT scan was   done, which showed changes consistent with ruptured appendicitis.  There was   some diffuse thickening of the terminal ileum and interloop abscess, most   likely containing the appendix.  Patient was felt to be a possible candidate   for percutaneous drainage, but interventional radiology was not initially   Available. Patient underwent uneventful laparoscopic washout and appendectomy and a drain was left in place. On POD0 he was advanced from a liquid diet and kept on IV abx. On POD2, he continued to be afebrile with a normal WBC count, was tolerating food well and his pain was well controlled with PO medications. He felt great and wanted to be discharged.  Therefore, he is discharged in good and stable condition to home with close outpatient follow-up.    The patient met 2-midnight criteria for an inpatient stay at the time of discharge.    Discharge  Date  8/15/2020    FOLLOW UP ITEMS POST DISCHARGE  Appendix pathology    DISCHARGE DIAGNOSES  Active Problems:    Acute appendicitis with generalized peritonitis and abscess, without gangrene POA: Yes  Resolved Problems:    * No resolved hospital problems. *      FOLLOW UP  No future appointments.  Will Rowan M.D.  [address] [facility] [address]  [place] [id number] [phone]    In 1 week      Daja Blevins M.D.  [address]  [address]  [facility] [id number] [phone]            MEDICATIONS ON DISCHARGE     Medication List      You have not been prescribed any medications.         Allergies  No Known Allergies    DIET  No orders of the defined types were placed in this encounter.      ACTIVITY  As tolerated.  10-lb lifting restriction    CONSULTATIONS  none    PROCEDURES  Laparoscopic appendectomy    LABORATORY  Lab Results   Component Value Date    SODIUM 141 08/19/2020    POTASSIUM 4.1 08/19/2020    CHLORIDE 105 08/19/2020    CO2 22 08/19/2020    GLUCOSE 131 (H) 08/19/2020    BUN 12 08/19/2020    CREATININE 0.56 08/19/2020        Lab Results   Component Value Date    WBC 9.0 08/18/2020    HEMOGLOBIN 14.1 08/18/2020    HEMATOCRIT 40.6 (L) 08/18/2020    PLATELETCT 292 08/18/2020        Total time of the discharge process exceeds 15 minutes.

## 2020-08-31 DIAGNOSIS — E78.2 MIXED HYPERLIPIDEMIA: ICD-10-CM

## 2020-09-01 RX ORDER — ATORVASTATIN CALCIUM 40 MG/1
40 TABLET, FILM COATED ORAL DAILY
Qty: 30 TAB | Refills: 5 | Status: SHIPPED | OUTPATIENT
Start: 2020-09-01 | End: 2020-11-30

## 2020-10-06 DIAGNOSIS — E11.9 TYPE 2 DIABETES MELLITUS WITHOUT COMPLICATION, WITHOUT LONG-TERM CURRENT USE OF INSULIN (HCC): ICD-10-CM

## 2020-10-06 NOTE — TELEPHONE ENCOUNTER
Received request via: Pharmacy    Was the patient seen in the last year in this department? Yes    Does the patient have an active prescription (recently filled or refills available) for medication(s) requested? No   No appt schedule at this time.

## 2021-03-03 DIAGNOSIS — Z23 NEED FOR VACCINATION: ICD-10-CM

## 2021-05-22 ENCOUNTER — HOSPITAL ENCOUNTER (OUTPATIENT)
Dept: LAB | Facility: MEDICAL CENTER | Age: 66
End: 2021-05-22
Attending: ANESTHESIOLOGY
Payer: MEDICARE

## 2021-05-22 LAB — COVID ORDER STATUS COVID19: NORMAL

## 2021-05-22 PROCEDURE — U0005 INFEC AGEN DETEC AMPLI PROBE: HCPCS

## 2021-05-22 PROCEDURE — C9803 HOPD COVID-19 SPEC COLLECT: HCPCS

## 2021-05-22 PROCEDURE — U0003 INFECTIOUS AGENT DETECTION BY NUCLEIC ACID (DNA OR RNA); SEVERE ACUTE RESPIRATORY SYNDROME CORONAVIRUS 2 (SARS-COV-2) (CORONAVIRUS DISEASE [COVID-19]), AMPLIFIED PROBE TECHNIQUE, MAKING USE OF HIGH THROUGHPUT TECHNOLOGIES AS DESCRIBED BY CMS-2020-01-R: HCPCS

## 2021-05-23 LAB
SARS-COV-2 RNA RESP QL NAA+PROBE: NOTDETECTED
SPECIMEN SOURCE: NORMAL

## 2021-06-04 ENCOUNTER — HOSPITAL ENCOUNTER (OUTPATIENT)
Dept: LAB | Facility: MEDICAL CENTER | Age: 66
End: 2021-06-04
Attending: ANESTHESIOLOGY
Payer: MEDICARE

## 2021-06-04 LAB
COVID ORDER STATUS COVID19: NORMAL
SARS-COV-2 RNA RESP QL NAA+PROBE: NOTDETECTED
SPECIMEN SOURCE: NORMAL

## 2021-06-04 PROCEDURE — U0003 INFECTIOUS AGENT DETECTION BY NUCLEIC ACID (DNA OR RNA); SEVERE ACUTE RESPIRATORY SYNDROME CORONAVIRUS 2 (SARS-COV-2) (CORONAVIRUS DISEASE [COVID-19]), AMPLIFIED PROBE TECHNIQUE, MAKING USE OF HIGH THROUGHPUT TECHNOLOGIES AS DESCRIBED BY CMS-2020-01-R: HCPCS

## 2021-06-04 PROCEDURE — U0005 INFEC AGEN DETEC AMPLI PROBE: HCPCS

## 2021-06-04 PROCEDURE — C9803 HOPD COVID-19 SPEC COLLECT: HCPCS

## 2021-10-22 NOTE — DISCHARGE SUMMARY
Discharge Summary    Date of Admission: 8/15/2020  Date of Discharge: 8/17/2020  Discharging Attending: Paul Arellano M.D.   Discharging Senior Resident: Dr. Bolaños  Discharging Intern: Dr. Bowman    CHIEF COMPLAINT ON ADMISSION  confusion    Reason for Admission  Altered mental status    Admission Date  8/15/2020    CODE STATUS  Full Code    HPI & HOSPITAL COURSE    64y.o male with pmhx of DM type 2, HTN,HLD, alcohol use, who recently underwent laparoscopic appendectomy at [facility] after ruptured appendix was brought in by his daughter for confusion. He was discharged from [facility] the afternoon, he was brought in here for admission.  He was reportedly more agitated and confused. On presentation noted to have AG 19, with blood sugars 234 and positive ketones and glucose in urine. AG and sugars improved with fluids and ISS while here. He was started empirically on zosyn and vancomycin which has been later discontinued as he remained afebrile with no leukocytosis. DAVID drain was left in place, minimal output. CT abdomen and head was done which did not show any acute abnormality. Electrolytes were monitored and replaced.    Patient improved overnight, AAOX4, able to give good history. Unclear cause. He was started on regular diet which he tolerated and resumed Augmentin for post op coverage as per surgery recommendations. He is ambulating well with no other complaints.       Therefore, he is discharged in good and stable condition to home with close outpatient follow-up.    The patient met 2-midnight criteria for an inpatient stay at the time of discharge.    PHYSICAL EXAM ON DISCHARGE  Temp:  [36.1 °C (97 °F)-36.5 °C (97.7 °F)] 36.1 °C (97 °F)  Pulse:  [70-97] 79  Resp:  [16-19] 19  BP: (142-163)/(87-97) 160/97  SpO2:  [92 %-98 %] 93 %    Physical Exam  Constitutional:       Appearance: Normal appearance.   HENT:      Head: Normocephalic and atraumatic.      Nose: Nose normal. No congestion.       Mouth/Throat:      Mouth: Mucous membranes are moist.      Pharynx: No oropharyngeal exudate.   Eyes:      Extraocular Movements: Extraocular movements intact.      Pupils: Pupils are equal, round, and reactive to light.   Neck:      Musculoskeletal: Normal range of motion. No neck rigidity.   Cardiovascular:      Rate and Rhythm: Normal rate and regular rhythm.      Heart sounds: Normal heart sounds. No murmur.   Pulmonary:      Effort: No respiratory distress.      Breath sounds: Normal breath sounds.   Abdominal:      General: Bowel sounds are normal.      Palpations: Abdomen is soft.      Tenderness: There is no abdominal tenderness.      Comments: DAVID drain in place   Musculoskeletal: Normal range of motion.         General: No swelling.   Skin:     General: Skin is warm.      Findings: No erythema.   Neurological:      General: No focal deficit present.      Mental Status: He is alert and oriented to person, place, and time.   Psychiatric:         Mood and Affect: Mood normal.         Judgment: Judgment normal.         Discharge Date  8/17/2020    FOLLOW UP ITEMS POST DISCHARGE  - complete 10 course of antibiotics as adviced by general surgery and follow with them as scheduled.    DISCHARGE DIAGNOSES  Principal Problem (Resolved):    Altered mental status, unspecified POA: Unknown  Active Problems:    Essential hypertension POA: Yes    Type 2 diabetes mellitus (HCC) POA: Unknown    S/P laparoscopic appendectomy POA: Unknown    Alcohol use POA: Unknown    Elevated liver enzymes POA: Unknown      FOLLOW UP  No future appointments.  Daja Blevins M.D.  [address]  [address]  [facility] [id number]  [phone]    In 1 week      Will Rowan M.D.  [address]  [facility] [address]  [phone]    In 1 week        MEDICATIONS ON DISCHARGE     Medication List      START taking these medications      Instructions   amoxicillin-clavulanate 875-125 MG Tabs  Commonly known as: AUGMENTIN   Doctor's comments:  meds to beds  Take 1 Tablet by mouth every 12 hours for 7 days.  Dose: 1 Tab        CONTINUE taking these medications      Instructions   aspirin EC 81 MG Tbec  Commonly known as: ECOTRIN   Take 81 mg by mouth every day.  Dose: 81 mg     atorvastatin 40 MG Tabs  Commonly known as: LIPITOR   Take 40 mg by mouth every day.  Dose: 40 mg     enalapril 2.5 MG Tabs  Start taking on: August 18, 2020  Commonly known as: VASOTEC   Take 1 Tab by mouth every day.  Dose: 2.5 mg     glimepiride 4 MG Tabs  Commonly known as: AMARYL   Take 4 mg by mouth 2 Times a Day.  Dose: 4 mg     metFORMIN 500 MG Tabs  Commonly known as: GLUCOPHAGE   Take 500 mg by mouth 2 times a day, with meals.  Dose: 500 mg        STOP taking these medications    multivitamin Tabs            Allergies  No Known Allergies    DIET  Orders Placed This Encounter   Procedures   • Diet Order Diabetic     Standing Status:   Standing     Number of Occurrences:   1     Order Specific Question:   Diet:     Answer:   Diabetic [3]       ACTIVITY  As tolerated    CONSULTATIONS  General surgery    PROCEDURES  none   none

## 2022-11-04 ENCOUNTER — PATIENT MESSAGE (OUTPATIENT)
Dept: HEALTH INFORMATION MANAGEMENT | Facility: OTHER | Age: 67
End: 2022-11-04

## (undated) DEVICE — TUBING INSUFFLATION - (10/BX)

## (undated) DEVICE — GOWN WARMING STANDARD FLEX - (30/CA)

## (undated) DEVICE — ELECTRODE 5MM LHK LAPSCP STERILE DISP- MEGADYNE  (5/CA)

## (undated) DEVICE — BAG SPONGE COUNT 10.25 X 32 - BLUE (250/CA)

## (undated) DEVICE — ELECTRODE DUAL RETURN W/ CORD - (50/PK)

## (undated) DEVICE — PROTECTOR ULNA NERVE - (36PR/CA)

## (undated) DEVICE — RESERVOIR SUCTION 100 CC - SILICONE (20EA/CA)

## (undated) DEVICE — CHLORAPREP 26 ML APPLICATOR - ORANGE TINT(25/CA)

## (undated) DEVICE — BANDAID SHEER STRIP 3/4 IN (100EA/BX 12BX/CA)

## (undated) DEVICE — STAPLER 45MM ARTICULATING - ENDO (3EA/BX)

## (undated) DEVICE — GLOVE BIOGEL PI ORTHO SZ 8 PF LF (40PR/BX)

## (undated) DEVICE — TROCAR Z THREAD12MM OPTICAL - NON BLADED (6/BX)

## (undated) DEVICE — SET SUCTION/IRRIGATION WITH DISPOSABLE TIP (6/CA )PART #0250-070-520 IS A SUB

## (undated) DEVICE — LACTATED RINGERS INJ 1000 ML - (14EA/CA 60CA/PF)

## (undated) DEVICE — SUTURE 3-0 ETHILON FS-1 - (36/BX) 30 INCH

## (undated) DEVICE — SUTURE 0 VICRYL PLUS CT-2 - 27 INCH (36/BX)

## (undated) DEVICE — ELECTRODE 850 FOAM ADHESIVE - HYDROGEL RADIOTRNSPRNT (50/PK)

## (undated) DEVICE — Device

## (undated) DEVICE — CANNULA W/SEAL 5X100 Z-THRE - ADED KII (12/BX)

## (undated) DEVICE — SODIUM CHL IRRIGATION 0.9% 1000ML (12EA/CA)

## (undated) DEVICE — KIT ANESTHESIA W/CIRCUIT & 3/LT BAG W/FILTER (20EA/CA)

## (undated) DEVICE — BANDAID X-LARGE 2 X 4 IN LF (50EA/BX)

## (undated) DEVICE — TROCAR 5X100 NON BLADED Z-TH - READ KII (6/BX)

## (undated) DEVICE — HUMID-VENT HEAT AND MOISTURE EXCHANGE- (50/BX)

## (undated) DEVICE — SUCTION INSTRUMENT YANKAUER BULBOUS TIP W/O VENT (50EA/CA)

## (undated) DEVICE — WATER IRRIGATION STERILE 1000ML (12EA/CA)

## (undated) DEVICE — CANISTER SUCTION RIGID RED 1500CC (40EA/CA)

## (undated) DEVICE — BAG RETRIEVAL 10ML (10EA/BX)

## (undated) DEVICE — NEPTUNE 4 PORT MANIFOLD - (20/PK)

## (undated) DEVICE — HEAD HOLDER JUNIOR/ADULT

## (undated) DEVICE — SENSOR SPO2 NEO LNCS ADHESIVE (20/BX) SEE USER NOTES

## (undated) DEVICE — GLOVE, LITE (PAIR)

## (undated) DEVICE — MASK ANESTHESIA ADULT  - (100/CA)

## (undated) DEVICE — TUBE CONNECTING SUCTION - CLEAR PLASTIC STERILE 72 IN (50EA/CA)

## (undated) DEVICE — DRAIN J-VAC 19FR. ROUND - (10/CS)

## (undated) DEVICE — SUTURE GENERAL

## (undated) DEVICE — STAPLE 45MM VASCULAR WHITE 2.5MM (12EA/BX)

## (undated) DEVICE — SUTURE 4-0 MONOCRYL PLUS PS-2 - 27 INCH (36/BX)